# Patient Record
Sex: FEMALE | Race: WHITE | NOT HISPANIC OR LATINO | Employment: OTHER | URBAN - NONMETROPOLITAN AREA
[De-identification: names, ages, dates, MRNs, and addresses within clinical notes are randomized per-mention and may not be internally consistent; named-entity substitution may affect disease eponyms.]

---

## 2018-04-04 ENCOUNTER — HOSPITAL ENCOUNTER (EMERGENCY)
Facility: HOSPITAL | Age: 63
Discharge: ANOTHER HOSPITAL | End: 2018-04-04
Attending: EMERGENCY MEDICINE | Admitting: EMERGENCY MEDICINE

## 2018-04-04 ENCOUNTER — APPOINTMENT (OUTPATIENT)
Dept: GENERAL RADIOLOGY | Facility: HOSPITAL | Age: 63
End: 2018-04-04

## 2018-04-04 ENCOUNTER — APPOINTMENT (OUTPATIENT)
Dept: CT IMAGING | Facility: HOSPITAL | Age: 63
End: 2018-04-04

## 2018-04-04 VITALS
SYSTOLIC BLOOD PRESSURE: 175 MMHG | DIASTOLIC BLOOD PRESSURE: 100 MMHG | TEMPERATURE: 98.5 F | HEIGHT: 70 IN | BODY MASS INDEX: 34.36 KG/M2 | HEART RATE: 97 BPM | OXYGEN SATURATION: 99 % | WEIGHT: 240 LBS | RESPIRATION RATE: 18 BRPM

## 2018-04-04 DIAGNOSIS — K92.2 UPPER GI BLEED: Primary | ICD-10-CM

## 2018-04-04 DIAGNOSIS — D64.9 ANEMIA, UNSPECIFIED TYPE: ICD-10-CM

## 2018-04-04 DIAGNOSIS — R74.8 CARDIAC ENZYMES ELEVATED: ICD-10-CM

## 2018-04-04 DIAGNOSIS — R07.9 CHEST PAIN, UNSPECIFIED TYPE: ICD-10-CM

## 2018-04-04 LAB
ABO GROUP BLD: NORMAL
ALBUMIN SERPL-MCNC: 3.6 G/DL (ref 3.4–4.8)
ALBUMIN/GLOB SERPL: 1.2 G/DL (ref 1.5–2.5)
ALP SERPL-CCNC: 44 U/L (ref 35–104)
ALT SERPL W P-5'-P-CCNC: 15 U/L (ref 10–36)
AMYLASE SERPL-CCNC: 53 U/L (ref 28–100)
ANION GAP SERPL CALCULATED.3IONS-SCNC: 11.2 MMOL/L (ref 3.6–11.2)
ANTI-E: NORMAL
AST SERPL-CCNC: 32 U/L (ref 10–30)
BASOPHILS # BLD AUTO: 0.02 10*3/MM3 (ref 0–0.3)
BASOPHILS NFR BLD AUTO: 0.1 % (ref 0–2)
BILIRUB SERPL-MCNC: 0.2 MG/DL (ref 0.2–1.8)
BILIRUB UR QL STRIP: NEGATIVE
BLD GP AB SCN SERPL QL: POSITIVE
BUN BLD-MCNC: 22 MG/DL (ref 7–21)
BUN/CREAT SERPL: 25.6 (ref 7–25)
CALCIUM SPEC-SCNC: 9.6 MG/DL (ref 7.7–10)
CHLORIDE SERPL-SCNC: 94 MMOL/L (ref 99–112)
CLARITY UR: ABNORMAL
CO2 SERPL-SCNC: 24.8 MMOL/L (ref 24.3–31.9)
COLOR UR: YELLOW
CREAT BLD-MCNC: 0.86 MG/DL (ref 0.43–1.29)
DEPRECATED RDW RBC AUTO: 38.8 FL (ref 37–54)
EOSINOPHIL # BLD AUTO: 0 10*3/MM3 (ref 0–0.7)
EOSINOPHIL NFR BLD AUTO: 0 % (ref 0–5)
ERYTHROCYTE [DISTWIDTH] IN BLOOD BY AUTOMATED COUNT: 12.2 % (ref 11.5–14.5)
GFR SERPL CREATININE-BSD FRML MDRD: 67 ML/MIN/1.73
GLOBULIN UR ELPH-MCNC: 3.1 GM/DL
GLUCOSE BLD-MCNC: 249 MG/DL (ref 70–110)
GLUCOSE UR STRIP-MCNC: ABNORMAL MG/DL
HCT VFR BLD AUTO: 33.4 % (ref 37–47)
HGB BLD-MCNC: 11 G/DL (ref 12–16)
HGB UR QL STRIP.AUTO: NEGATIVE
IMM GRANULOCYTES # BLD: 0.06 10*3/MM3 (ref 0–0.03)
IMM GRANULOCYTES NFR BLD: 0.3 % (ref 0–0.5)
KETONES UR QL STRIP: ABNORMAL
LEUKOCYTE ESTERASE UR QL STRIP.AUTO: NEGATIVE
LIPASE SERPL-CCNC: 18 U/L (ref 13–60)
LYMPHOCYTES # BLD AUTO: 1.02 10*3/MM3 (ref 1–3)
LYMPHOCYTES NFR BLD AUTO: 5.2 % (ref 21–51)
MCH RBC QN AUTO: 30.2 PG (ref 27–33)
MCHC RBC AUTO-ENTMCNC: 32.9 G/DL (ref 33–37)
MCV RBC AUTO: 91.8 FL (ref 80–94)
MONOCYTES # BLD AUTO: 0.94 10*3/MM3 (ref 0.1–0.9)
MONOCYTES NFR BLD AUTO: 4.8 % (ref 0–10)
NEUTROPHILS # BLD AUTO: 17.55 10*3/MM3 (ref 1.4–6.5)
NEUTROPHILS NFR BLD AUTO: 89.6 % (ref 30–70)
NITRITE UR QL STRIP: NEGATIVE
OSMOLALITY SERPL CALC.SUM OF ELEC: 272.5 MOSM/KG (ref 273–305)
PH UR STRIP.AUTO: 7 [PH] (ref 5–8)
PLATELET # BLD AUTO: 513 10*3/MM3 (ref 130–400)
PMV BLD AUTO: 9.5 FL (ref 6–10)
PROT SERPL-MCNC: 6.7 G/DL (ref 6–8)
PROT UR QL STRIP: NEGATIVE
RBC # BLD AUTO: 3.64 10*6/MM3 (ref 4.2–5.4)
RH BLD: POSITIVE
SODIUM BLD-SCNC: 130 MMOL/L (ref 135–153)
SP GR UR STRIP: 1.02 (ref 1–1.03)
T&S EXPIRATION DATE: NORMAL
TROPONIN I SERPL-MCNC: 0.03 NG/ML
TROPONIN I SERPL-MCNC: 0.1 NG/ML
UROBILINOGEN UR QL STRIP: ABNORMAL
WBC NRBC COR # BLD: 19.59 10*3/MM3 (ref 4.5–12.5)

## 2018-04-04 PROCEDURE — 86870 RBC ANTIBODY IDENTIFICATION: CPT

## 2018-04-04 PROCEDURE — 74176 CT ABD & PELVIS W/O CONTRAST: CPT | Performed by: RADIOLOGY

## 2018-04-04 PROCEDURE — 84484 ASSAY OF TROPONIN QUANT: CPT | Performed by: EMERGENCY MEDICINE

## 2018-04-04 PROCEDURE — 87040 BLOOD CULTURE FOR BACTERIA: CPT | Performed by: EMERGENCY MEDICINE

## 2018-04-04 PROCEDURE — 74176 CT ABD & PELVIS W/O CONTRAST: CPT

## 2018-04-04 PROCEDURE — 25010000002 MORPHINE PER 10 MG

## 2018-04-04 PROCEDURE — 96365 THER/PROPH/DIAG IV INF INIT: CPT

## 2018-04-04 PROCEDURE — 71045 X-RAY EXAM CHEST 1 VIEW: CPT | Performed by: RADIOLOGY

## 2018-04-04 PROCEDURE — 25010000002 MORPHINE PER 10 MG: Performed by: EMERGENCY MEDICINE

## 2018-04-04 PROCEDURE — 80053 COMPREHEN METABOLIC PANEL: CPT | Performed by: EMERGENCY MEDICINE

## 2018-04-04 PROCEDURE — 85025 COMPLETE CBC W/AUTO DIFF WBC: CPT | Performed by: EMERGENCY MEDICINE

## 2018-04-04 PROCEDURE — 86870 RBC ANTIBODY IDENTIFICATION: CPT | Performed by: EMERGENCY MEDICINE

## 2018-04-04 PROCEDURE — 83690 ASSAY OF LIPASE: CPT | Performed by: EMERGENCY MEDICINE

## 2018-04-04 PROCEDURE — 93005 ELECTROCARDIOGRAM TRACING: CPT | Performed by: EMERGENCY MEDICINE

## 2018-04-04 PROCEDURE — 82150 ASSAY OF AMYLASE: CPT | Performed by: EMERGENCY MEDICINE

## 2018-04-04 PROCEDURE — 86900 BLOOD TYPING SEROLOGIC ABO: CPT | Performed by: EMERGENCY MEDICINE

## 2018-04-04 PROCEDURE — 96375 TX/PRO/DX INJ NEW DRUG ADDON: CPT

## 2018-04-04 PROCEDURE — 86850 RBC ANTIBODY SCREEN: CPT | Performed by: EMERGENCY MEDICINE

## 2018-04-04 PROCEDURE — 86901 BLOOD TYPING SEROLOGIC RH(D): CPT | Performed by: EMERGENCY MEDICINE

## 2018-04-04 PROCEDURE — 71045 X-RAY EXAM CHEST 1 VIEW: CPT

## 2018-04-04 PROCEDURE — 86905 BLOOD TYPING RBC ANTIGENS: CPT

## 2018-04-04 PROCEDURE — 81003 URINALYSIS AUTO W/O SCOPE: CPT | Performed by: EMERGENCY MEDICINE

## 2018-04-04 PROCEDURE — 93010 ELECTROCARDIOGRAM REPORT: CPT | Performed by: INTERNAL MEDICINE

## 2018-04-04 PROCEDURE — 99284 EMERGENCY DEPT VISIT MOD MDM: CPT

## 2018-04-04 PROCEDURE — 25010000002 ONDANSETRON PER 1 MG: Performed by: EMERGENCY MEDICINE

## 2018-04-04 PROCEDURE — 96376 TX/PRO/DX INJ SAME DRUG ADON: CPT

## 2018-04-04 RX ORDER — ASPIRIN 81 MG/1
324 TABLET, CHEWABLE ORAL ONCE
Status: COMPLETED | OUTPATIENT
Start: 2018-04-04 | End: 2018-04-04

## 2018-04-04 RX ORDER — MORPHINE SULFATE 4 MG/ML
INJECTION, SOLUTION INTRAMUSCULAR; INTRAVENOUS
Status: COMPLETED
Start: 2018-04-04 | End: 2018-04-04

## 2018-04-04 RX ORDER — ONDANSETRON 2 MG/ML
4 INJECTION INTRAMUSCULAR; INTRAVENOUS ONCE
Status: COMPLETED | OUTPATIENT
Start: 2018-04-04 | End: 2018-04-04

## 2018-04-04 RX ORDER — ONDANSETRON 2 MG/ML
INJECTION INTRAMUSCULAR; INTRAVENOUS
Status: DISCONTINUED
Start: 2018-04-04 | End: 2018-04-04 | Stop reason: HOSPADM

## 2018-04-04 RX ORDER — PANTOPRAZOLE SODIUM 40 MG/10ML
80 INJECTION, POWDER, LYOPHILIZED, FOR SOLUTION INTRAVENOUS ONCE
Status: COMPLETED | OUTPATIENT
Start: 2018-04-04 | End: 2018-04-04

## 2018-04-04 RX ORDER — MORPHINE SULFATE 4 MG/ML
INJECTION, SOLUTION INTRAMUSCULAR; INTRAVENOUS
Status: DISCONTINUED
Start: 2018-04-04 | End: 2018-04-04 | Stop reason: HOSPADM

## 2018-04-04 RX ORDER — LABETALOL HYDROCHLORIDE 5 MG/ML
10 INJECTION, SOLUTION INTRAVENOUS ONCE
Status: COMPLETED | OUTPATIENT
Start: 2018-04-04 | End: 2018-04-04

## 2018-04-04 RX ORDER — SODIUM CHLORIDE 0.9 % (FLUSH) 0.9 %
10 SYRINGE (ML) INJECTION AS NEEDED
Status: DISCONTINUED | OUTPATIENT
Start: 2018-04-04 | End: 2018-04-04 | Stop reason: HOSPADM

## 2018-04-04 RX ADMIN — ASPIRIN 324 MG: 81 TABLET, CHEWABLE ORAL at 15:12

## 2018-04-04 RX ADMIN — SODIUM CHLORIDE 1000 ML: 9 INJECTION, SOLUTION INTRAVENOUS at 15:30

## 2018-04-04 RX ADMIN — MORPHINE SULFATE 4 MG: 4 INJECTION, SOLUTION INTRAMUSCULAR; INTRAVENOUS at 19:56

## 2018-04-04 RX ADMIN — NITROGLYCERIN 1 INCH: 20 OINTMENT TOPICAL at 15:27

## 2018-04-04 RX ADMIN — ONDANSETRON 4 MG: 2 INJECTION INTRAMUSCULAR; INTRAVENOUS at 16:43

## 2018-04-04 RX ADMIN — ONDANSETRON 4 MG: 2 INJECTION INTRAMUSCULAR; INTRAVENOUS at 19:56

## 2018-04-04 RX ADMIN — PANTOPRAZOLE SODIUM 80 MG: 40 INJECTION, POWDER, FOR SOLUTION INTRAVENOUS at 16:43

## 2018-04-04 RX ADMIN — MORPHINE SULFATE 4 MG: 4 INJECTION, SOLUTION INTRAMUSCULAR; INTRAVENOUS at 17:47

## 2018-04-04 RX ADMIN — MORPHINE SULFATE 4 MG: 4 INJECTION, SOLUTION INTRAMUSCULAR; INTRAVENOUS at 16:43

## 2018-04-04 RX ADMIN — PANTOPRAZOLE SODIUM 8 MG/HR: 40 INJECTION, POWDER, FOR SOLUTION INTRAVENOUS at 16:56

## 2018-04-04 RX ADMIN — ONDANSETRON 4 MG: 2 INJECTION INTRAMUSCULAR; INTRAVENOUS at 15:32

## 2018-04-04 RX ADMIN — Medication 4 MG: at 17:47

## 2018-04-04 RX ADMIN — LABETALOL HYDROCHLORIDE 10 MG: 5 INJECTION, SOLUTION INTRAVENOUS at 16:18

## 2018-04-04 NOTE — ED NOTES
"Pt family came to nurses' station and informed that the patient was \"fixing to vomit\".  Pt had large amount of dark colored and foul smelling vomit, Dr. Edouard notified.  Gastroocult obtained and positive at this time.     Danielle Pope RN  04/04/18 8015    "

## 2018-04-04 NOTE — ED NOTES
I called [facility] back because we didn't hear from the [facility]. Dispatch said [facility] accepted the run but it would be after their truck clears up that is currently bringing in the new patient.     Corey Olguin  04/04/18 1929

## 2018-04-04 NOTE — ED PROVIDER NOTES
Subjective     History provided by:  Patient  Vomiting   The primary symptoms include abdominal pain (mild), nausea and vomiting. Primary symptoms do not include fever or diarrhea. The illness began more than 7 days ago (3-4 weeks). The onset was gradual. The problem has not changed since onset.  The abdominal pain is generalized.   The emesis contains stomach contents.       Review of Systems   Constitutional: Negative for fever.   Gastrointestinal: Positive for abdominal pain (mild), nausea and vomiting. Negative for diarrhea.       Past Medical History:   Diagnosis Date   • Arthritis    • Asthma    • Blind    • Hypertension    • Low back pain    • Seizures    • Stomach ulcer        Allergies   Allergen Reactions   • Zithromax [Azithromycin] Hives   • Codeine Rash   • Penicillins Rash   • Phenobarbital Anxiety       Past Surgical History:   Procedure Laterality Date   • APPENDECTOMY     • BREAST SURGERY      reduction   • CHOLECYSTECTOMY     • HYSTERECTOMY         Family History   Problem Relation Age of Onset   • Cancer Mother    • Diabetes Mother    • Hypertension Mother    • Hypertension Father    • Heart disease Father        Social History     Social History   • Marital status:      Social History Main Topics   • Smoking status: Never Smoker   • Smokeless tobacco: Never Used   • Alcohol use No   • Drug use: No   • Sexual activity: Defer     Other Topics Concern   • Not on file           Objective   Physical Exam   Constitutional: She is oriented to person, place, and time. She appears well-developed and well-nourished.   HENT:   Head: Normocephalic and atraumatic.   Cardiovascular: Normal rate, regular rhythm and normal heart sounds.  Exam reveals no gallop and no friction rub.    No murmur heard.  Pulmonary/Chest: Effort normal and breath sounds normal. No respiratory distress. She has no wheezes. She has no rales.   Abdominal: Soft. Bowel sounds are normal. She exhibits no distension. There is no  tenderness.   Musculoskeletal: Normal range of motion. She exhibits no edema.   Neurological: She is alert and oriented to person, place, and time.   Legally blind.  Hard of hearing.   Skin: Skin is warm and dry.   Psychiatric: She has a normal mood and affect.   Nursing note and vitals reviewed.      Procedures  Results for orders placed or performed during the hospital encounter of 04/04/18   Comprehensive Metabolic Panel   Result Value Ref Range    Glucose 249 (H) 70 - 110 mg/dL    BUN 22 (H) 7 - 21 mg/dL    Creatinine 0.86 0.43 - 1.29 mg/dL    Sodium 130 (L) 135 - 153 mmol/L    Chloride 94 (L) 99 - 112 mmol/L    CO2 24.8 24.3 - 31.9 mmol/L    Calcium 9.6 7.7 - 10.0 mg/dL    Total Protein 6.7 6.0 - 8.0 g/dL    Albumin 3.60 3.40 - 4.80 g/dL    ALT (SGPT) 15 10 - 36 U/L    AST (SGOT) 32 (H) 10 - 30 U/L    Alkaline Phosphatase 44 35 - 104 U/L    Total Bilirubin 0.2 0.2 - 1.8 mg/dL    eGFR Non African Amer 67 >60 mL/min/1.73    Globulin 3.1 gm/dL    A/G Ratio 1.2 (L) 1.5 - 2.5 g/dL    BUN/Creatinine Ratio 25.6 (H) 7.0 - 25.0    Anion Gap 11.2 3.6 - 11.2 mmol/L   Lipase   Result Value Ref Range    Lipase 18 13 - 60 U/L   Amylase   Result Value Ref Range    Amylase 53 28 - 100 U/L   Urinalysis With / Culture If Indicated - Urine, Clean Catch   Result Value Ref Range    Color, UA Yellow Yellow, Straw    Appearance, UA Turbid (A) Clear    pH, UA 7.0 5.0 - 8.0    Specific Gravity, UA 1.024 1.005 - 1.030    Glucose,  mg/dL (2+) (A) Negative    Ketones, UA Trace (A) Negative    Bilirubin, UA Negative Negative    Blood, UA Negative Negative    Protein, UA Negative Negative    Leuk Esterase, UA Negative Negative    Nitrite, UA Negative Negative    Urobilinogen, UA 0.2 E.U./dL 0.2 - 1.0 E.U./dL   Troponin   Result Value Ref Range    Troponin I 0.034 <=0.040 ng/mL   CBC Auto Differential   Result Value Ref Range    WBC 19.59 (H) 4.50 - 12.50 10*3/mm3    RBC 3.64 (L) 4.20 - 5.40 10*6/mm3    Hemoglobin 11.0 (L) 12.0 - 16.0  g/dL    Hematocrit 33.4 (L) 37.0 - 47.0 %    MCV 91.8 80.0 - 94.0 fL    MCH 30.2 27.0 - 33.0 pg    MCHC 32.9 (L) 33.0 - 37.0 g/dL    RDW 12.2 11.5 - 14.5 %    RDW-SD 38.8 37.0 - 54.0 fl    MPV 9.5 6.0 - 10.0 fL    Platelets 513 (H) 130 - 400 10*3/mm3    Neutrophil % 89.6 (H) 30.0 - 70.0 %    Lymphocyte % 5.2 (L) 21.0 - 51.0 %    Monocyte % 4.8 0.0 - 10.0 %    Eosinophil % 0.0 0.0 - 5.0 %    Basophil % 0.1 0.0 - 2.0 %    Immature Grans % 0.3 0.0 - 0.5 %    Neutrophils, Absolute 17.55 (H) 1.40 - 6.50 10*3/mm3    Lymphocytes, Absolute 1.02 1.00 - 3.00 10*3/mm3    Monocytes, Absolute 0.94 (H) 0.10 - 0.90 10*3/mm3    Eosinophils, Absolute 0.00 0.00 - 0.70 10*3/mm3    Basophils, Absolute 0.02 0.00 - 0.30 10*3/mm3    Immature Grans, Absolute 0.06 (H) 0.00 - 0.03 10*3/mm3   Osmolality, Calculated   Result Value Ref Range    Osmolality Calc 272.5 (L) 273.0 - 305.0 mOsm/kg   Troponin   Result Value Ref Range    Troponin I 0.104 (H) <=0.040 ng/mL   Type & Screen   Result Value Ref Range    ABO Type O     RH type Positive      Xr Chest 1 View    Result Date: 4/4/2018  Narrative: EXAMINATION: XR CHEST 1 VW-  CLINICAL INDICATION:     cp  TECHNIQUE:  XR CHEST 1 VW-  COMPARISON: NONE  FINDINGS: Lungs are aerated. Probable prominent right-sided epicardial fat. Borderline cardiomegaly. No pneumothorax. No pleural effusion. No acute osseous findings.         Impression: No acute cardio bony findings identified. Probable prominent right epicardial fat noted.  This report was finalized on 4/4/2018 3:06 PM by Dr. Bora Foreman MD.             ED Course  ED Course   Value Comment By Time   ECG 12 Lead Normal sinus rhythm.  Rate 95.  T-wave inversions in lead III, isoelectric T waves in aVF.  No ST elevations or depressions.  No old EKGs for comparison. Wilbur Edouard MD 04/04 4346    Patient had episode of vomiting here which was coffee-ground emesis, gastric occult positive.  Hemodynamically, she is continued to be hypertensive.  She's  not had any further episodes after this.  Her workup otherwise remarkable for indeterminate, but increasing, troponin.  She has mild anemia, but no old labs for comparison.  She also has elevated white blood cell count.  Imaging without significant abnormals. Wilbur Edouard MD 04/04 1834    We do not have any gastrectomy urology coverage at this time.  Have discussed with Dr. Sotelo at [facility] who agrees to take the patient in transfer.  He just requests that we discussed this with Dr Neff. Wilbur Edouard MD 04/04 1841    Discussed with Dr. Neff who recommends accepting patient in transfer. Wilbur Edouard MD 04/04 1851                  MDM  Number of Diagnoses or Management Options  Anemia, unspecified type:   Cardiac enzymes elevated:   Chest pain, unspecified type:   Upper GI bleed:      Amount and/or Complexity of Data Reviewed  Clinical lab tests: reviewed  Tests in the radiology section of CPT®: reviewed  Tests in the medicine section of CPT®: reviewed  Independent visualization of images, tracings, or specimens: yes    Risk of Complications, Morbidity, and/or Mortality  Presenting problems: high  Diagnostic procedures: high  Management options: high        Final diagnoses:   Upper GI bleed   Chest pain, unspecified type   Cardiac enzymes elevated   Anemia, unspecified type            Wilbur Edouard MD  04/04/18 2038

## 2018-04-04 NOTE — ED NOTES
"I called [facility] for an ALS truck, dispatch said, \"Let me get in touch with the [facility] and I'll have them get a hold of you.\"     Corey Olguin  04/04/18 1909    "

## 2018-04-05 NOTE — ED NOTES
"I called [facility] to touch base about the run. Dispatch said, \"it has been paged, they should be there shortly.\"     Corey Olguin  04/04/18 2031    "

## 2018-04-05 NOTE — ED NOTES
Pt left via [facility] ems, no acute changes noted in patient's health     Emily Malhotra RN  04/04/18 8470

## 2018-04-05 NOTE — ED NOTES
I called EMS to take the patien to [facility], so the ER2 patient can have EMS truck due to it being several hours before they could get a run to take that patient to [place].     Corey Olguin  04/04/18 9296

## 2018-04-09 LAB
BACTERIA SPEC AEROBE CULT: NORMAL
BACTERIA SPEC AEROBE CULT: NORMAL

## 2018-12-16 ENCOUNTER — APPOINTMENT (OUTPATIENT)
Dept: CT IMAGING | Facility: HOSPITAL | Age: 63
End: 2018-12-16

## 2018-12-16 ENCOUNTER — APPOINTMENT (OUTPATIENT)
Dept: GENERAL RADIOLOGY | Facility: HOSPITAL | Age: 63
End: 2018-12-16

## 2018-12-16 ENCOUNTER — HOSPITAL ENCOUNTER (INPATIENT)
Facility: HOSPITAL | Age: 63
LOS: 3 days | Discharge: HOME HEALTH | End: 2018-12-19
Attending: GENERAL ACUTE CARE HOSPITAL | Admitting: HOSPITALIST

## 2018-12-16 DIAGNOSIS — R11.2 INTRACTABLE VOMITING WITH NAUSEA, UNSPECIFIED VOMITING TYPE: Primary | ICD-10-CM

## 2018-12-16 DIAGNOSIS — K44.9 HIATAL HERNIA: ICD-10-CM

## 2018-12-16 DIAGNOSIS — E83.42 HYPOMAGNESEMIA: ICD-10-CM

## 2018-12-16 DIAGNOSIS — E87.6 HYPOKALEMIA: ICD-10-CM

## 2018-12-16 LAB
ABO GROUP BLD: NORMAL
ALBUMIN SERPL-MCNC: 3.6 G/DL (ref 3.4–4.8)
ALBUMIN/GLOB SERPL: 1.2 G/DL (ref 1.5–2.5)
ALP SERPL-CCNC: 93 U/L (ref 35–104)
ALT SERPL W P-5'-P-CCNC: 17 U/L (ref 10–36)
AMYLASE SERPL-CCNC: 39 U/L (ref 28–100)
ANION GAP SERPL CALCULATED.3IONS-SCNC: 9.1 MMOL/L (ref 3.6–11.2)
ANISOCYTOSIS BLD QL: NORMAL
ANTI-E: NORMAL
APTT PPP: 30.7 SECONDS (ref 23.8–36.1)
AST SERPL-CCNC: 33 U/L (ref 10–30)
BASOPHILS # BLD AUTO: 0.03 10*3/MM3 (ref 0–0.3)
BASOPHILS NFR BLD AUTO: 0.3 % (ref 0–2)
BILIRUB SERPL-MCNC: 0.2 MG/DL (ref 0.2–1.8)
BILIRUB UR QL STRIP: NEGATIVE
BLD GP AB SCN SERPL QL: POSITIVE
BUN BLD-MCNC: 13 MG/DL (ref 7–21)
BUN/CREAT SERPL: 20.6 (ref 7–25)
CALCIUM SPEC-SCNC: 8.4 MG/DL (ref 7.7–10)
CHLORIDE SERPL-SCNC: 89 MMOL/L (ref 99–112)
CLARITY UR: CLEAR
CO2 SERPL-SCNC: 33.9 MMOL/L (ref 24.3–31.9)
COLOR UR: ABNORMAL
CREAT BLD-MCNC: 0.63 MG/DL (ref 0.43–1.29)
DEPRECATED RDW RBC AUTO: 42.4 FL (ref 37–54)
DEVELOPER EXPIRATION DATE: ABNORMAL
DEVELOPER LOT NUMBER: ABNORMAL
EOSINOPHIL # BLD AUTO: 0.01 10*3/MM3 (ref 0–0.7)
EOSINOPHIL NFR BLD AUTO: 0.1 % (ref 0–5)
ERYTHROCYTE [DISTWIDTH] IN BLOOD BY AUTOMATED COUNT: 16.8 % (ref 11.5–14.5)
EXPIRATION DATE: ABNORMAL
FECAL OCCULT BLOOD SCREEN, POC: POSITIVE
GFR SERPL CREATININE-BSD FRML MDRD: 96 ML/MIN/1.73
GLOBULIN UR ELPH-MCNC: 2.9 GM/DL
GLUCOSE BLD-MCNC: 118 MG/DL (ref 70–110)
GLUCOSE UR STRIP-MCNC: NEGATIVE MG/DL
HCT VFR BLD AUTO: 32.8 % (ref 37–47)
HGB BLD-MCNC: 9.5 G/DL (ref 12–16)
HGB UR QL STRIP.AUTO: NEGATIVE
HOLD SPECIMEN: NORMAL
HOLD SPECIMEN: NORMAL
HYPOCHROMIA BLD QL: NORMAL
IMM GRANULOCYTES # BLD: 0.03 10*3/MM3 (ref 0–0.03)
IMM GRANULOCYTES NFR BLD: 0.3 % (ref 0–0.5)
INR PPP: 1.7 (ref 0.9–1.1)
KETONES UR QL STRIP: ABNORMAL
LEUKOCYTE ESTERASE UR QL STRIP.AUTO: NEGATIVE
LIPASE SERPL-CCNC: 21 U/L (ref 13–60)
LYMPHOCYTES # BLD AUTO: 2.8 10*3/MM3 (ref 1–3)
LYMPHOCYTES NFR BLD AUTO: 24.7 % (ref 21–51)
Lab: ABNORMAL
MAGNESIUM SERPL-MCNC: 1.1 MG/DL (ref 1.7–2.6)
MCH RBC QN AUTO: 20.3 PG (ref 27–33)
MCHC RBC AUTO-ENTMCNC: 29 G/DL (ref 33–37)
MCV RBC AUTO: 69.9 FL (ref 80–94)
MICROCYTES BLD QL: NORMAL
MONOCYTES # BLD AUTO: 1.5 10*3/MM3 (ref 0.1–0.9)
MONOCYTES NFR BLD AUTO: 13.3 % (ref 0–10)
NEGATIVE CONTROL: NEGATIVE
NEUTROPHILS # BLD AUTO: 6.95 10*3/MM3 (ref 1.4–6.5)
NEUTROPHILS NFR BLD AUTO: 61.3 % (ref 30–70)
NITRITE UR QL STRIP: NEGATIVE
OSMOLALITY SERPL CALC.SUM OF ELEC: 265.7 MOSM/KG (ref 273–305)
PH UR STRIP.AUTO: 6.5 [PH] (ref 5–8)
PLAT MORPH BLD: NORMAL
PLATELET # BLD AUTO: 504 10*3/MM3 (ref 130–400)
PMV BLD AUTO: 8.9 FL (ref 6–10)
POSITIVE CONTROL: POSITIVE
POTASSIUM BLD-SCNC: 2.3 MMOL/L (ref 3.5–5.3)
PROT SERPL-MCNC: 6.5 G/DL (ref 6–8)
PROT UR QL STRIP: ABNORMAL
PROTHROMBIN TIME: 20.2 SECONDS (ref 11–15.4)
RBC # BLD AUTO: 4.69 10*6/MM3 (ref 4.2–5.4)
RH BLD: POSITIVE
SODIUM BLD-SCNC: 132 MMOL/L (ref 135–153)
SP GR UR STRIP: 1.02 (ref 1–1.03)
STOMATOCYTES BLD QL SMEAR: NORMAL
T&S EXPIRATION DATE: NORMAL
T4 FREE SERPL-MCNC: 1.32 NG/DL (ref 0.89–1.76)
TROPONIN I SERPL-MCNC: 0.07 NG/ML
TROPONIN I SERPL-MCNC: 0.12 NG/ML
TSH SERPL DL<=0.05 MIU/L-ACNC: 1.59 MIU/ML (ref 0.55–4.78)
UROBILINOGEN UR QL STRIP: ABNORMAL
WBC NRBC COR # BLD: 11.32 10*3/MM3 (ref 4.5–12.5)
WHOLE BLOOD HOLD SPECIMEN: NORMAL
WHOLE BLOOD HOLD SPECIMEN: NORMAL

## 2018-12-16 PROCEDURE — 25010000002 IOPAMIDOL 61 % SOLUTION: Performed by: EMERGENCY MEDICINE

## 2018-12-16 PROCEDURE — 82270 OCCULT BLOOD FECES: CPT | Performed by: EMERGENCY MEDICINE

## 2018-12-16 PROCEDURE — 71045 X-RAY EXAM CHEST 1 VIEW: CPT | Performed by: RADIOLOGY

## 2018-12-16 PROCEDURE — 86901 BLOOD TYPING SEROLOGIC RH(D): CPT | Performed by: GENERAL ACUTE CARE HOSPITAL

## 2018-12-16 PROCEDURE — 84443 ASSAY THYROID STIM HORMONE: CPT | Performed by: EMERGENCY MEDICINE

## 2018-12-16 PROCEDURE — 86850 RBC ANTIBODY SCREEN: CPT | Performed by: GENERAL ACUTE CARE HOSPITAL

## 2018-12-16 PROCEDURE — 25010000002 MAGNESIUM SULFATE 2 GM/50ML SOLUTION: Performed by: EMERGENCY MEDICINE

## 2018-12-16 PROCEDURE — 36415 COLL VENOUS BLD VENIPUNCTURE: CPT

## 2018-12-16 PROCEDURE — 85025 COMPLETE CBC W/AUTO DIFF WBC: CPT | Performed by: GENERAL ACUTE CARE HOSPITAL

## 2018-12-16 PROCEDURE — 85007 BL SMEAR W/DIFF WBC COUNT: CPT | Performed by: GENERAL ACUTE CARE HOSPITAL

## 2018-12-16 PROCEDURE — 74177 CT ABD & PELVIS W/CONTRAST: CPT | Performed by: RADIOLOGY

## 2018-12-16 PROCEDURE — 81003 URINALYSIS AUTO W/O SCOPE: CPT | Performed by: EMERGENCY MEDICINE

## 2018-12-16 PROCEDURE — 25010000002 MORPHINE PER 10 MG: Performed by: EMERGENCY MEDICINE

## 2018-12-16 PROCEDURE — P9612 CATHETERIZE FOR URINE SPEC: HCPCS

## 2018-12-16 PROCEDURE — 25010000002 MORPHINE PER 10 MG

## 2018-12-16 PROCEDURE — 80053 COMPREHEN METABOLIC PANEL: CPT | Performed by: GENERAL ACUTE CARE HOSPITAL

## 2018-12-16 PROCEDURE — 74177 CT ABD & PELVIS W/CONTRAST: CPT

## 2018-12-16 PROCEDURE — 86920 COMPATIBILITY TEST SPIN: CPT

## 2018-12-16 PROCEDURE — 71045 X-RAY EXAM CHEST 1 VIEW: CPT

## 2018-12-16 PROCEDURE — 85730 THROMBOPLASTIN TIME PARTIAL: CPT | Performed by: GENERAL ACUTE CARE HOSPITAL

## 2018-12-16 PROCEDURE — 25010000002 ONDANSETRON PER 1 MG: Performed by: GENERAL ACUTE CARE HOSPITAL

## 2018-12-16 PROCEDURE — 86922 COMPATIBILITY TEST ANTIGLOB: CPT

## 2018-12-16 PROCEDURE — 86900 BLOOD TYPING SEROLOGIC ABO: CPT | Performed by: GENERAL ACUTE CARE HOSPITAL

## 2018-12-16 PROCEDURE — 84484 ASSAY OF TROPONIN QUANT: CPT | Performed by: EMERGENCY MEDICINE

## 2018-12-16 PROCEDURE — 99285 EMERGENCY DEPT VISIT HI MDM: CPT

## 2018-12-16 PROCEDURE — 84484 ASSAY OF TROPONIN QUANT: CPT | Performed by: GENERAL ACUTE CARE HOSPITAL

## 2018-12-16 PROCEDURE — 82150 ASSAY OF AMYLASE: CPT | Performed by: EMERGENCY MEDICINE

## 2018-12-16 PROCEDURE — 25010000003 POTASSIUM CHLORIDE 10 MEQ/100ML SOLUTION: Performed by: EMERGENCY MEDICINE

## 2018-12-16 PROCEDURE — 85610 PROTHROMBIN TIME: CPT | Performed by: GENERAL ACUTE CARE HOSPITAL

## 2018-12-16 PROCEDURE — 84439 ASSAY OF FREE THYROXINE: CPT | Performed by: EMERGENCY MEDICINE

## 2018-12-16 PROCEDURE — 83735 ASSAY OF MAGNESIUM: CPT | Performed by: EMERGENCY MEDICINE

## 2018-12-16 PROCEDURE — 83690 ASSAY OF LIPASE: CPT | Performed by: GENERAL ACUTE CARE HOSPITAL

## 2018-12-16 PROCEDURE — 93005 ELECTROCARDIOGRAM TRACING: CPT | Performed by: EMERGENCY MEDICINE

## 2018-12-16 PROCEDURE — 86902 BLOOD TYPE ANTIGEN DONOR EA: CPT

## 2018-12-16 PROCEDURE — 86870 RBC ANTIBODY IDENTIFICATION: CPT | Performed by: GENERAL ACUTE CARE HOSPITAL

## 2018-12-16 PROCEDURE — 25010000002 ONDANSETRON PER 1 MG: Performed by: EMERGENCY MEDICINE

## 2018-12-16 RX ORDER — MAGNESIUM SULFATE HEPTAHYDRATE 40 MG/ML
2 INJECTION, SOLUTION INTRAVENOUS ONCE
Status: COMPLETED | OUTPATIENT
Start: 2018-12-16 | End: 2018-12-17

## 2018-12-16 RX ORDER — POTASSIUM CHLORIDE 750 MG/1
40 CAPSULE, EXTENDED RELEASE ORAL AS NEEDED
Status: DISCONTINUED | OUTPATIENT
Start: 2018-12-16 | End: 2018-12-19 | Stop reason: HOSPADM

## 2018-12-16 RX ORDER — POTASSIUM CHLORIDE 7.45 MG/ML
10 INJECTION INTRAVENOUS
Status: DISCONTINUED | OUTPATIENT
Start: 2018-12-16 | End: 2018-12-17

## 2018-12-16 RX ORDER — POTASSIUM CHLORIDE 7.45 MG/ML
10 INJECTION INTRAVENOUS
Status: DISCONTINUED | OUTPATIENT
Start: 2018-12-16 | End: 2018-12-19 | Stop reason: HOSPADM

## 2018-12-16 RX ORDER — PANTOPRAZOLE SODIUM 40 MG/10ML
40 INJECTION, POWDER, LYOPHILIZED, FOR SOLUTION INTRAVENOUS ONCE
Status: COMPLETED | OUTPATIENT
Start: 2018-12-16 | End: 2018-12-16

## 2018-12-16 RX ORDER — MAGNESIUM SULFATE HEPTAHYDRATE 40 MG/ML
4 INJECTION, SOLUTION INTRAVENOUS AS NEEDED
Status: DISCONTINUED | OUTPATIENT
Start: 2018-12-16 | End: 2018-12-19 | Stop reason: HOSPADM

## 2018-12-16 RX ORDER — MAGNESIUM SULFATE HEPTAHYDRATE 40 MG/ML
2 INJECTION, SOLUTION INTRAVENOUS AS NEEDED
Status: DISCONTINUED | OUTPATIENT
Start: 2018-12-16 | End: 2018-12-19 | Stop reason: HOSPADM

## 2018-12-16 RX ORDER — SODIUM CHLORIDE 9 MG/ML
125 INJECTION, SOLUTION INTRAVENOUS CONTINUOUS
Status: DISCONTINUED | OUTPATIENT
Start: 2018-12-16 | End: 2018-12-17

## 2018-12-16 RX ORDER — MORPHINE SULFATE 2 MG/ML
2 INJECTION, SOLUTION INTRAMUSCULAR; INTRAVENOUS ONCE
Status: COMPLETED | OUTPATIENT
Start: 2018-12-16 | End: 2018-12-16

## 2018-12-16 RX ORDER — POTASSIUM CHLORIDE 1.5 G/1.77G
40 POWDER, FOR SOLUTION ORAL AS NEEDED
Status: DISCONTINUED | OUTPATIENT
Start: 2018-12-16 | End: 2018-12-19 | Stop reason: HOSPADM

## 2018-12-16 RX ORDER — ONDANSETRON 2 MG/ML
4 INJECTION INTRAMUSCULAR; INTRAVENOUS ONCE
Status: COMPLETED | OUTPATIENT
Start: 2018-12-16 | End: 2018-12-16

## 2018-12-16 RX ADMIN — MORPHINE SULFATE 2 MG: 2 INJECTION, SOLUTION INTRAMUSCULAR; INTRAVENOUS at 20:58

## 2018-12-16 RX ADMIN — ONDANSETRON 4 MG: 2 INJECTION, SOLUTION INTRAMUSCULAR; INTRAVENOUS at 19:20

## 2018-12-16 RX ADMIN — SODIUM CHLORIDE 1000 ML: 9 INJECTION, SOLUTION INTRAVENOUS at 19:19

## 2018-12-16 RX ADMIN — IOPAMIDOL 90 ML: 612 INJECTION, SOLUTION INTRAVENOUS at 21:34

## 2018-12-16 RX ADMIN — POTASSIUM CHLORIDE 10 MEQ: 7.46 INJECTION, SOLUTION INTRAVENOUS at 22:16

## 2018-12-16 RX ADMIN — POTASSIUM CHLORIDE 10 MEQ: 7.46 INJECTION, SOLUTION INTRAVENOUS at 23:16

## 2018-12-16 RX ADMIN — SODIUM CHLORIDE 125 ML/HR: 9 INJECTION, SOLUTION INTRAVENOUS at 20:50

## 2018-12-16 RX ADMIN — MAGNESIUM SULFATE IN WATER 2 G: 40 INJECTION, SOLUTION INTRAVENOUS at 23:59

## 2018-12-16 RX ADMIN — ONDANSETRON 4 MG: 2 INJECTION, SOLUTION INTRAMUSCULAR; INTRAVENOUS at 20:54

## 2018-12-16 RX ADMIN — PANTOPRAZOLE SODIUM 40 MG: 40 INJECTION, POWDER, FOR SOLUTION INTRAVENOUS at 20:53

## 2018-12-16 NOTE — ED NOTES
Patient states she has taken some 2mg Imodium for her diarrhea today.     Isidoro Fonseca RN  12/16/18 5575

## 2018-12-17 ENCOUNTER — APPOINTMENT (OUTPATIENT)
Dept: CARDIOLOGY | Facility: HOSPITAL | Age: 63
End: 2018-12-17
Attending: HOSPITALIST

## 2018-12-17 LAB
ALBUMIN SERPL-MCNC: 3.9 G/DL (ref 3.4–4.8)
ALBUMIN/GLOB SERPL: 1.3 G/DL (ref 1.5–2.5)
ALP SERPL-CCNC: 100 U/L (ref 35–104)
ALT SERPL W P-5'-P-CCNC: 21 U/L (ref 10–36)
ANION GAP SERPL CALCULATED.3IONS-SCNC: 10.2 MMOL/L (ref 3.6–11.2)
ANISOCYTOSIS BLD QL: NORMAL
AST SERPL-CCNC: 36 U/L (ref 10–30)
BASOPHILS # BLD AUTO: 0.03 10*3/MM3 (ref 0–0.3)
BASOPHILS NFR BLD AUTO: 0.2 % (ref 0–2)
BH CV ECHO MEAS - AO MAX PG (FULL): 5.4 MMHG
BH CV ECHO MEAS - AO MAX PG: 14.4 MMHG
BH CV ECHO MEAS - AO MEAN PG (FULL): 1.4 MMHG
BH CV ECHO MEAS - AO MEAN PG: 7.7 MMHG
BH CV ECHO MEAS - AO ROOT AREA (BSA CORRECTED): 1.6
BH CV ECHO MEAS - AO ROOT AREA: 9.6 CM^2
BH CV ECHO MEAS - AO ROOT DIAM: 3.5 CM
BH CV ECHO MEAS - AO V2 MAX: 189 CM/SEC
BH CV ECHO MEAS - AO V2 MEAN: 130.8 CM/SEC
BH CV ECHO MEAS - AO V2 VTI: 38.1 CM
BH CV ECHO MEAS - BSA(HAYCOCK): 2.3 M^2
BH CV ECHO MEAS - BSA: 2.2 M^2
BH CV ECHO MEAS - BZI_BMI: 32.2 KILOGRAMS/M^2
BH CV ECHO MEAS - BZI_METRIC_HEIGHT: 178 CM
BH CV ECHO MEAS - BZI_METRIC_WEIGHT: 102 KG
BH CV ECHO MEAS - EDV(CUBED): 112.4 ML
BH CV ECHO MEAS - EDV(MOD-SP2): 54.4 ML
BH CV ECHO MEAS - EDV(MOD-SP4): 66.4 ML
BH CV ECHO MEAS - EDV(TEICH): 108.9 ML
BH CV ECHO MEAS - EF(CUBED): 62.8 %
BH CV ECHO MEAS - EF(TEICH): 54.2 %
BH CV ECHO MEAS - ESV(CUBED): 41.8 ML
BH CV ECHO MEAS - ESV(TEICH): 49.8 ML
BH CV ECHO MEAS - FS: 28.1 %
BH CV ECHO MEAS - IVS/LVPW: 0.8
BH CV ECHO MEAS - IVSD: 0.86 CM
BH CV ECHO MEAS - LA DIMENSION: 3.4 CM
BH CV ECHO MEAS - LA/AO: 0.97
BH CV ECHO MEAS - LV DIASTOLIC VOL/BSA (35-75): 30.2 ML/M^2
BH CV ECHO MEAS - LV MASS(C)D: 164.7 GRAMS
BH CV ECHO MEAS - LV MASS(C)DI: 75 GRAMS/M^2
BH CV ECHO MEAS - LV MAX PG: 9 MMHG
BH CV ECHO MEAS - LV MEAN PG: 6.3 MMHG
BH CV ECHO MEAS - LV V1 MAX: 150 CM/SEC
BH CV ECHO MEAS - LV V1 MEAN: 123 CM/SEC
BH CV ECHO MEAS - LV V1 VTI: 28.2 CM
BH CV ECHO MEAS - LVIDD: 4.8 CM
BH CV ECHO MEAS - LVIDS: 3.5 CM
BH CV ECHO MEAS - LVPWD: 1.1 CM
BH CV ECHO MEAS - MV A MAX VEL: 78.6 CM/SEC
BH CV ECHO MEAS - MV E MAX VEL: 78 CM/SEC
BH CV ECHO MEAS - MV E/A: 0.99
BH CV ECHO MEAS - PA ACC TIME: 0.12 SEC
BH CV ECHO MEAS - PA MAX PG: 3.6 MMHG
BH CV ECHO MEAS - PA MEAN PG: 2.2 MMHG
BH CV ECHO MEAS - PA PR(ACCEL): 26.4 MMHG
BH CV ECHO MEAS - PA V2 MAX: 95.5 CM/SEC
BH CV ECHO MEAS - PA V2 MEAN: 73.3 CM/SEC
BH CV ECHO MEAS - PA V2 VTI: 20.1 CM
BH CV ECHO MEAS - RAP SYSTOLE: 10 MMHG
BH CV ECHO MEAS - RVSP: 21.6 MMHG
BH CV ECHO MEAS - SI(AO): 167 ML/M^2
BH CV ECHO MEAS - SI(CUBED): 32.2 ML/M^2
BH CV ECHO MEAS - SI(TEICH): 26.9 ML/M^2
BH CV ECHO MEAS - SV(AO): 366.6 ML
BH CV ECHO MEAS - SV(CUBED): 70.6 ML
BH CV ECHO MEAS - SV(TEICH): 59 ML
BH CV ECHO MEAS - TR MAX VEL: 170.1 CM/SEC
BILIRUB SERPL-MCNC: 0.2 MG/DL (ref 0.2–1.8)
BNP SERPL-MCNC: 135 PG/ML (ref 0–100)
BUN BLD-MCNC: 9 MG/DL (ref 7–21)
BUN/CREAT SERPL: 14.5 (ref 7–25)
CALCIUM SPEC-SCNC: 8.6 MG/DL (ref 7.7–10)
CHLORIDE SERPL-SCNC: 89 MMOL/L (ref 99–112)
CK MB SERPL-CCNC: 9.93 NG/ML (ref 0–5)
CK MB SERPL-RTO: 12 % (ref 0–3)
CK SERPL-CCNC: 83 U/L (ref 24–173)
CO2 SERPL-SCNC: 31.8 MMOL/L (ref 24.3–31.9)
CREAT BLD-MCNC: 0.62 MG/DL (ref 0.43–1.29)
DEPRECATED RDW RBC AUTO: 42.4 FL (ref 37–54)
EOSINOPHIL # BLD AUTO: 0 10*3/MM3 (ref 0–0.7)
EOSINOPHIL NFR BLD AUTO: 0 % (ref 0–5)
ERYTHROCYTE [DISTWIDTH] IN BLOOD BY AUTOMATED COUNT: 17.1 % (ref 11.5–14.5)
FOLATE SERPL-MCNC: 12.88 NG/ML (ref 5.4–20)
GFR SERPL CREATININE-BSD FRML MDRD: 98 ML/MIN/1.73
GLOBULIN UR ELPH-MCNC: 2.9 GM/DL
GLUCOSE BLD-MCNC: 181 MG/DL (ref 70–110)
HBA1C MFR BLD: 7.4 % (ref 4.5–5.7)
HCT VFR BLD AUTO: 30.5 % (ref 37–47)
HGB BLD-MCNC: 8.8 G/DL (ref 12–16)
HYPOCHROMIA BLD QL: NORMAL
IMM GRANULOCYTES # BLD: 0.02 10*3/MM3 (ref 0–0.03)
IMM GRANULOCYTES NFR BLD: 0.2 % (ref 0–0.5)
INR PPP: 1.23 (ref 0.9–1.1)
IRON 24H UR-MRATE: 18 MCG/DL (ref 49–151)
IRON SATN MFR SERPL: 5 % (ref 15–50)
LYMPHOCYTES # BLD AUTO: 2.09 10*3/MM3 (ref 1–3)
LYMPHOCYTES NFR BLD AUTO: 17.1 % (ref 21–51)
MAGNESIUM SERPL-MCNC: 2.8 MG/DL (ref 1.7–2.6)
MAXIMAL PREDICTED HEART RATE: 159 BPM
MCH RBC QN AUTO: 20.1 PG (ref 27–33)
MCHC RBC AUTO-ENTMCNC: 28.9 G/DL (ref 33–37)
MCV RBC AUTO: 69.8 FL (ref 80–94)
MICROCYTES BLD QL: NORMAL
MONOCYTES # BLD AUTO: 1.44 10*3/MM3 (ref 0.1–0.9)
MONOCYTES NFR BLD AUTO: 11.8 % (ref 0–10)
MYOGLOBIN SERPL-MCNC: 66 NG/ML (ref 0–109)
NEUTROPHILS # BLD AUTO: 8.64 10*3/MM3 (ref 1.4–6.5)
NEUTROPHILS NFR BLD AUTO: 70.7 % (ref 30–70)
OSMOLALITY SERPL CALC.SUM OF ELEC: 265.9 MOSM/KG (ref 273–305)
PLATELET # BLD AUTO: 613 10*3/MM3 (ref 130–400)
PMV BLD AUTO: 9.1 FL (ref 6–10)
POTASSIUM BLD-SCNC: 2.3 MMOL/L (ref 3.5–5.3)
POTASSIUM BLD-SCNC: 2.8 MMOL/L (ref 3.5–5.3)
POTASSIUM BLD-SCNC: 2.9 MMOL/L (ref 3.5–5.3)
PROT SERPL-MCNC: 6.8 G/DL (ref 6–8)
PROTHROMBIN TIME: 15.7 SECONDS (ref 11–15.4)
RBC # BLD AUTO: 4.37 10*6/MM3 (ref 4.2–5.4)
SMALL PLATELETS BLD QL SMEAR: NORMAL
SODIUM BLD-SCNC: 131 MMOL/L (ref 135–153)
STOMATOCYTES BLD QL SMEAR: NORMAL
STRESS TARGET HR: 135 BPM
TIBC SERPL-MCNC: 391 MCG/DL (ref 241–421)
TROPONIN I SERPL-MCNC: 0.1 NG/ML
TROPONIN I SERPL-MCNC: 0.14 NG/ML
VIT B12 BLD-MCNC: 650 PG/ML (ref 211–911)
WBC NRBC COR # BLD: 12.22 10*3/MM3 (ref 4.5–12.5)

## 2018-12-17 PROCEDURE — 82553 CREATINE MB FRACTION: CPT | Performed by: HOSPITALIST

## 2018-12-17 PROCEDURE — 94799 UNLISTED PULMONARY SVC/PX: CPT

## 2018-12-17 PROCEDURE — 83735 ASSAY OF MAGNESIUM: CPT | Performed by: HOSPITALIST

## 2018-12-17 PROCEDURE — 25010000002 MAGNESIUM SULFATE 2 GM/50ML SOLUTION: Performed by: HOSPITALIST

## 2018-12-17 PROCEDURE — 83874 ASSAY OF MYOGLOBIN: CPT | Performed by: HOSPITALIST

## 2018-12-17 PROCEDURE — 80053 COMPREHEN METABOLIC PANEL: CPT | Performed by: HOSPITALIST

## 2018-12-17 PROCEDURE — 99223 1ST HOSP IP/OBS HIGH 75: CPT | Performed by: HOSPITALIST

## 2018-12-17 PROCEDURE — 85025 COMPLETE CBC W/AUTO DIFF WBC: CPT | Performed by: HOSPITALIST

## 2018-12-17 PROCEDURE — 80183 DRUG SCRN QUANT OXCARBAZEPIN: CPT | Performed by: HOSPITALIST

## 2018-12-17 PROCEDURE — 82550 ASSAY OF CK (CPK): CPT | Performed by: HOSPITALIST

## 2018-12-17 PROCEDURE — 85007 BL SMEAR W/DIFF WBC COUNT: CPT | Performed by: HOSPITALIST

## 2018-12-17 PROCEDURE — 94640 AIRWAY INHALATION TREATMENT: CPT

## 2018-12-17 PROCEDURE — 25010000002 MORPHINE (PF) 10 MG/ML SOLUTION: Performed by: HOSPITALIST

## 2018-12-17 PROCEDURE — 80157 ASSAY CARBAMAZEPINE FREE: CPT | Performed by: INTERNAL MEDICINE

## 2018-12-17 PROCEDURE — 85610 PROTHROMBIN TIME: CPT | Performed by: HOSPITALIST

## 2018-12-17 PROCEDURE — 25010000003 POTASSIUM CHLORIDE 10 MEQ/100ML SOLUTION: Performed by: HOSPITALIST

## 2018-12-17 PROCEDURE — 25010000003 POTASSIUM CHLORIDE 10 MEQ/100ML SOLUTION: Performed by: EMERGENCY MEDICINE

## 2018-12-17 PROCEDURE — 82746 ASSAY OF FOLIC ACID SERUM: CPT | Performed by: HOSPITALIST

## 2018-12-17 PROCEDURE — 83550 IRON BINDING TEST: CPT | Performed by: HOSPITALIST

## 2018-12-17 PROCEDURE — 84484 ASSAY OF TROPONIN QUANT: CPT | Performed by: HOSPITALIST

## 2018-12-17 PROCEDURE — 83880 ASSAY OF NATRIURETIC PEPTIDE: CPT | Performed by: HOSPITALIST

## 2018-12-17 PROCEDURE — 84132 ASSAY OF SERUM POTASSIUM: CPT | Performed by: INTERNAL MEDICINE

## 2018-12-17 PROCEDURE — 83540 ASSAY OF IRON: CPT | Performed by: HOSPITALIST

## 2018-12-17 PROCEDURE — 82607 VITAMIN B-12: CPT | Performed by: HOSPITALIST

## 2018-12-17 PROCEDURE — 83036 HEMOGLOBIN GLYCOSYLATED A1C: CPT | Performed by: HOSPITALIST

## 2018-12-17 PROCEDURE — G0108 DIAB MANAGE TRN  PER INDIV: HCPCS

## 2018-12-17 PROCEDURE — 93306 TTE W/DOPPLER COMPLETE: CPT | Performed by: INTERNAL MEDICINE

## 2018-12-17 PROCEDURE — 25810000003 SODIUM CHLORIDE 0.9 % WITH KCL 20 MEQ 20-0.9 MEQ/L-% SOLUTION: Performed by: INTERNAL MEDICINE

## 2018-12-17 PROCEDURE — 80156 ASSAY CARBAMAZEPINE TOTAL: CPT | Performed by: INTERNAL MEDICINE

## 2018-12-17 PROCEDURE — 93306 TTE W/DOPPLER COMPLETE: CPT

## 2018-12-17 PROCEDURE — 25010000002 IRON SUCROSE PER 1 MG: Performed by: INTERNAL MEDICINE

## 2018-12-17 RX ORDER — NITROGLYCERIN 0.4 MG/1
0.4 TABLET SUBLINGUAL
Status: DISCONTINUED | OUTPATIENT
Start: 2018-12-17 | End: 2018-12-19 | Stop reason: HOSPADM

## 2018-12-17 RX ORDER — ALBUTEROL SULFATE 2.5 MG/3ML
2.5 SOLUTION RESPIRATORY (INHALATION) EVERY 6 HOURS PRN
Status: DISCONTINUED | OUTPATIENT
Start: 2018-12-17 | End: 2018-12-19 | Stop reason: HOSPADM

## 2018-12-17 RX ORDER — ASPIRIN 81 MG/1
81 TABLET ORAL DAILY
Status: CANCELLED | OUTPATIENT
Start: 2018-12-17

## 2018-12-17 RX ORDER — PANTOPRAZOLE SODIUM 40 MG/1
40 TABLET, DELAYED RELEASE ORAL 2 TIMES DAILY
COMMUNITY

## 2018-12-17 RX ORDER — LISINOPRIL 20 MG/1
20 TABLET ORAL DAILY
COMMUNITY
End: 2018-12-19 | Stop reason: HOSPADM

## 2018-12-17 RX ORDER — OXCARBAZEPINE 300 MG/1
600 TABLET, FILM COATED ORAL EVERY 12 HOURS SCHEDULED
Status: DISCONTINUED | OUTPATIENT
Start: 2018-12-17 | End: 2018-12-19 | Stop reason: HOSPADM

## 2018-12-17 RX ORDER — ASPIRIN 81 MG/1
81 TABLET ORAL DAILY
Status: DISCONTINUED | OUTPATIENT
Start: 2018-12-17 | End: 2018-12-19

## 2018-12-17 RX ORDER — CETIRIZINE HYDROCHLORIDE 10 MG/1
10 TABLET ORAL NIGHTLY
COMMUNITY

## 2018-12-17 RX ORDER — POTASSIUM CHLORIDE 7.45 MG/ML
10 INJECTION INTRAVENOUS
Status: COMPLETED | OUTPATIENT
Start: 2018-12-17 | End: 2018-12-17

## 2018-12-17 RX ORDER — PANTOPRAZOLE SODIUM 40 MG/1
40 TABLET, DELAYED RELEASE ORAL 2 TIMES DAILY
Status: CANCELLED | OUTPATIENT
Start: 2018-12-17

## 2018-12-17 RX ORDER — MONTELUKAST SODIUM 10 MG/1
10 TABLET ORAL NIGHTLY
Status: DISCONTINUED | OUTPATIENT
Start: 2018-12-17 | End: 2018-12-19 | Stop reason: HOSPADM

## 2018-12-17 RX ORDER — MONTELUKAST SODIUM 10 MG/1
10 TABLET ORAL NIGHTLY
Status: CANCELLED | OUTPATIENT
Start: 2018-12-17

## 2018-12-17 RX ORDER — LISINOPRIL 10 MG/1
20 TABLET ORAL DAILY
Status: CANCELLED | OUTPATIENT
Start: 2018-12-17

## 2018-12-17 RX ORDER — SODIUM CHLORIDE AND POTASSIUM CHLORIDE 150; 900 MG/100ML; MG/100ML
20 INJECTION, SOLUTION INTRAVENOUS CONTINUOUS
Status: DISCONTINUED | OUTPATIENT
Start: 2018-12-17 | End: 2018-12-19

## 2018-12-17 RX ORDER — AMLODIPINE BESYLATE 5 MG/1
5 TABLET ORAL DAILY
Status: DISCONTINUED | OUTPATIENT
Start: 2018-12-17 | End: 2018-12-19 | Stop reason: HOSPADM

## 2018-12-17 RX ORDER — POTASSIUM CHLORIDE 20 MEQ/1
40 TABLET, EXTENDED RELEASE ORAL EVERY 4 HOURS
Status: COMPLETED | OUTPATIENT
Start: 2018-12-17 | End: 2018-12-18

## 2018-12-17 RX ORDER — OXYCODONE HYDROCHLORIDE 5 MG/1
10 TABLET ORAL EVERY 6 HOURS PRN
Status: DISCONTINUED | OUTPATIENT
Start: 2018-12-17 | End: 2018-12-19 | Stop reason: HOSPADM

## 2018-12-17 RX ORDER — OXYCODONE HYDROCHLORIDE 5 MG/1
10 TABLET ORAL EVERY 6 HOURS PRN
Status: CANCELLED | OUTPATIENT
Start: 2018-12-17

## 2018-12-17 RX ORDER — GABAPENTIN 300 MG/1
600 CAPSULE ORAL EVERY 8 HOURS SCHEDULED
Status: CANCELLED | OUTPATIENT
Start: 2018-12-17

## 2018-12-17 RX ORDER — PANTOPRAZOLE SODIUM 40 MG/10ML
40 INJECTION, POWDER, LYOPHILIZED, FOR SOLUTION INTRAVENOUS
Status: DISCONTINUED | OUTPATIENT
Start: 2018-12-17 | End: 2018-12-17

## 2018-12-17 RX ORDER — FLUOXETINE HYDROCHLORIDE 20 MG/1
20 CAPSULE ORAL DAILY
Status: CANCELLED | OUTPATIENT
Start: 2018-12-17

## 2018-12-17 RX ORDER — CETIRIZINE HYDROCHLORIDE 10 MG/1
10 TABLET ORAL NIGHTLY
Status: CANCELLED | OUTPATIENT
Start: 2018-12-17

## 2018-12-17 RX ORDER — OXCARBAZEPINE 300 MG/1
600 TABLET, FILM COATED ORAL EVERY 12 HOURS SCHEDULED
Status: CANCELLED | OUTPATIENT
Start: 2018-12-17

## 2018-12-17 RX ORDER — GABAPENTIN 400 MG/1
400 CAPSULE ORAL EVERY 8 HOURS SCHEDULED
Status: DISCONTINUED | OUTPATIENT
Start: 2018-12-17 | End: 2018-12-19 | Stop reason: HOSPADM

## 2018-12-17 RX ORDER — IPRATROPIUM BROMIDE AND ALBUTEROL SULFATE 2.5; .5 MG/3ML; MG/3ML
3 SOLUTION RESPIRATORY (INHALATION) EVERY 6 HOURS PRN
Status: DISCONTINUED | OUTPATIENT
Start: 2018-12-17 | End: 2018-12-19 | Stop reason: HOSPADM

## 2018-12-17 RX ORDER — ALBUTEROL SULFATE 2.5 MG/3ML
2.5 SOLUTION RESPIRATORY (INHALATION) EVERY 4 HOURS PRN
Status: CANCELLED | OUTPATIENT
Start: 2018-12-17

## 2018-12-17 RX ORDER — AMLODIPINE BESYLATE 5 MG/1
5 TABLET ORAL DAILY
Status: CANCELLED | OUTPATIENT
Start: 2018-12-17

## 2018-12-17 RX ORDER — MORPHINE SULFATE 10 MG/ML
1 INJECTION, SOLUTION INTRAMUSCULAR; INTRAVENOUS EVERY 4 HOURS PRN
Status: DISCONTINUED | OUTPATIENT
Start: 2018-12-17 | End: 2018-12-19 | Stop reason: HOSPADM

## 2018-12-17 RX ORDER — OXCARBAZEPINE 600 MG/1
600 TABLET, FILM COATED ORAL 2 TIMES DAILY
COMMUNITY

## 2018-12-17 RX ORDER — POTASSIUM CHLORIDE 20 MEQ/1
40 TABLET, EXTENDED RELEASE ORAL
Status: COMPLETED | OUTPATIENT
Start: 2018-12-17 | End: 2018-12-17

## 2018-12-17 RX ORDER — CETIRIZINE HYDROCHLORIDE 10 MG/1
10 TABLET ORAL NIGHTLY
Status: DISCONTINUED | OUTPATIENT
Start: 2018-12-17 | End: 2018-12-19 | Stop reason: HOSPADM

## 2018-12-17 RX ORDER — PANTOPRAZOLE SODIUM 40 MG/10ML
40 INJECTION, POWDER, LYOPHILIZED, FOR SOLUTION INTRAVENOUS
Status: DISCONTINUED | OUTPATIENT
Start: 2018-12-17 | End: 2018-12-19 | Stop reason: HOSPADM

## 2018-12-17 RX ORDER — SUCRALFATE 1 G/1
1 TABLET ORAL 4 TIMES DAILY
COMMUNITY

## 2018-12-17 RX ORDER — FLUOXETINE HYDROCHLORIDE 20 MG/1
20 CAPSULE ORAL DAILY
Status: ON HOLD | COMMUNITY
End: 2019-04-29

## 2018-12-17 RX ORDER — ASPIRIN 81 MG/1
81 TABLET ORAL DAILY
COMMUNITY
End: 2019-01-15

## 2018-12-17 RX ORDER — SODIUM CHLORIDE 0.9 % (FLUSH) 0.9 %
3 SYRINGE (ML) INJECTION EVERY 12 HOURS SCHEDULED
Status: DISCONTINUED | OUTPATIENT
Start: 2018-12-17 | End: 2018-12-19 | Stop reason: HOSPADM

## 2018-12-17 RX ORDER — OXYCODONE HYDROCHLORIDE 10 MG/1
10 TABLET ORAL EVERY 6 HOURS PRN
COMMUNITY

## 2018-12-17 RX ORDER — TRAZODONE HYDROCHLORIDE 50 MG/1
50 TABLET ORAL NIGHTLY
COMMUNITY
End: 2019-04-22

## 2018-12-17 RX ORDER — SODIUM CHLORIDE 0.9 % (FLUSH) 0.9 %
3-10 SYRINGE (ML) INJECTION AS NEEDED
Status: DISCONTINUED | OUTPATIENT
Start: 2018-12-17 | End: 2018-12-19 | Stop reason: HOSPADM

## 2018-12-17 RX ORDER — GABAPENTIN 300 MG/1
600 CAPSULE ORAL EVERY 8 HOURS SCHEDULED
Status: DISCONTINUED | OUTPATIENT
Start: 2018-12-17 | End: 2018-12-17

## 2018-12-17 RX ORDER — MAGNESIUM SULFATE HEPTAHYDRATE 40 MG/ML
2 INJECTION, SOLUTION INTRAVENOUS
Status: COMPLETED | OUTPATIENT
Start: 2018-12-17 | End: 2018-12-17

## 2018-12-17 RX ORDER — SUCRALFATE 1 G/1
1 TABLET ORAL 4 TIMES DAILY
Status: DISCONTINUED | OUTPATIENT
Start: 2018-12-17 | End: 2018-12-19 | Stop reason: HOSPADM

## 2018-12-17 RX ORDER — TRAZODONE HYDROCHLORIDE 50 MG/1
50 TABLET ORAL NIGHTLY PRN
Status: DISCONTINUED | OUTPATIENT
Start: 2018-12-17 | End: 2018-12-19 | Stop reason: HOSPADM

## 2018-12-17 RX ORDER — FLUOXETINE HYDROCHLORIDE 20 MG/1
20 CAPSULE ORAL DAILY
Status: DISCONTINUED | OUTPATIENT
Start: 2018-12-17 | End: 2018-12-19 | Stop reason: HOSPADM

## 2018-12-17 RX ORDER — AMLODIPINE BESYLATE 5 MG/1
5 TABLET ORAL DAILY
COMMUNITY

## 2018-12-17 RX ADMIN — SUCRALFATE 1 G: 1 TABLET ORAL at 21:27

## 2018-12-17 RX ADMIN — FLUOXETINE 20 MG: 20 CAPSULE ORAL at 13:02

## 2018-12-17 RX ADMIN — POTASSIUM CHLORIDE 40 MEQ: 1500 TABLET, EXTENDED RELEASE ORAL at 11:37

## 2018-12-17 RX ADMIN — PANTOPRAZOLE SODIUM 40 MG: 40 INJECTION, POWDER, FOR SOLUTION INTRAVENOUS at 06:43

## 2018-12-17 RX ADMIN — AMLODIPINE BESYLATE 5 MG: 5 TABLET ORAL at 13:02

## 2018-12-17 RX ADMIN — SODIUM CHLORIDE 125 ML/HR: 9 INJECTION, SOLUTION INTRAVENOUS at 05:47

## 2018-12-17 RX ADMIN — ASPIRIN 81 MG: 81 TABLET ORAL at 13:02

## 2018-12-17 RX ADMIN — POTASSIUM CHLORIDE 40 MEQ: 1500 TABLET, EXTENDED RELEASE ORAL at 21:27

## 2018-12-17 RX ADMIN — MONTELUKAST SODIUM 10 MG: 10 TABLET, COATED ORAL at 21:27

## 2018-12-17 RX ADMIN — SODIUM CHLORIDE, PRESERVATIVE FREE 3 ML: 5 INJECTION INTRAVENOUS at 07:36

## 2018-12-17 RX ADMIN — PANTOPRAZOLE SODIUM 40 MG: 40 INJECTION, POWDER, FOR SOLUTION INTRAVENOUS at 16:39

## 2018-12-17 RX ADMIN — POTASSIUM CHLORIDE 10 MEQ: 10 INJECTION, SOLUTION INTRAVENOUS at 04:18

## 2018-12-17 RX ADMIN — POTASSIUM CHLORIDE 10 MEQ: 10 INJECTION, SOLUTION INTRAVENOUS at 02:11

## 2018-12-17 RX ADMIN — OXCARBAZEPINE 600 MG: 300 TABLET, FILM COATED ORAL at 21:27

## 2018-12-17 RX ADMIN — CETIRIZINE HYDROCHLORIDE 10 MG: 10 TABLET, FILM COATED ORAL at 21:27

## 2018-12-17 RX ADMIN — GABAPENTIN 400 MG: 400 CAPSULE ORAL at 13:02

## 2018-12-17 RX ADMIN — POTASSIUM CHLORIDE 10 MEQ: 7.46 INJECTION, SOLUTION INTRAVENOUS at 00:16

## 2018-12-17 RX ADMIN — GABAPENTIN 400 MG: 400 CAPSULE ORAL at 21:27

## 2018-12-17 RX ADMIN — MAGNESIUM SULFATE IN WATER 2 G: 40 INJECTION, SOLUTION INTRAVENOUS at 04:19

## 2018-12-17 RX ADMIN — OXCARBAZEPINE 600 MG: 300 TABLET, FILM COATED ORAL at 07:36

## 2018-12-17 RX ADMIN — MORPHINE SULFATE 1 MG: 10 INJECTION INTRAVENOUS at 21:46

## 2018-12-17 RX ADMIN — SODIUM CHLORIDE AND POTASSIUM CHLORIDE 100 ML/HR: 9; 1.49 INJECTION, SOLUTION INTRAVENOUS at 21:27

## 2018-12-17 RX ADMIN — OXYCODONE HYDROCHLORIDE 10 MG: 5 TABLET ORAL at 16:53

## 2018-12-17 RX ADMIN — IRON SUCROSE 200 MG: 20 INJECTION, SOLUTION INTRAVENOUS at 09:27

## 2018-12-17 RX ADMIN — SUCRALFATE 1 G: 1 TABLET ORAL at 13:02

## 2018-12-17 RX ADMIN — POTASSIUM CHLORIDE 40 MEQ: 1500 TABLET, EXTENDED RELEASE ORAL at 13:02

## 2018-12-17 RX ADMIN — SUCRALFATE 1 G: 1 TABLET ORAL at 16:39

## 2018-12-17 RX ADMIN — IPRATROPIUM BROMIDE AND ALBUTEROL SULFATE 3 ML: .5; 3 SOLUTION RESPIRATORY (INHALATION) at 18:23

## 2018-12-17 RX ADMIN — SODIUM CHLORIDE, PRESERVATIVE FREE 3 ML: 5 INJECTION INTRAVENOUS at 21:28

## 2018-12-17 RX ADMIN — SODIUM CHLORIDE AND POTASSIUM CHLORIDE 100 ML/HR: 9; 1.49 INJECTION, SOLUTION INTRAVENOUS at 09:27

## 2018-12-17 RX ADMIN — MAGNESIUM SULFATE IN WATER 2 G: 40 INJECTION, SOLUTION INTRAVENOUS at 02:11

## 2018-12-17 RX ADMIN — MORPHINE SULFATE 1 MG: 10 INJECTION INTRAVENOUS at 04:22

## 2018-12-17 RX ADMIN — POTASSIUM CHLORIDE 10 MEQ: 10 INJECTION, SOLUTION INTRAVENOUS at 03:16

## 2018-12-17 RX ADMIN — IPRATROPIUM BROMIDE AND ALBUTEROL SULFATE 3 ML: .5; 3 SOLUTION RESPIRATORY (INHALATION) at 06:38

## 2018-12-17 RX ADMIN — SODIUM CHLORIDE, PRESERVATIVE FREE 3 ML: 5 INJECTION INTRAVENOUS at 02:10

## 2018-12-17 NOTE — CONSULTS
Pt currently laying in bed, alert and oriented.  Pt son at bedside.  Pt reports she was dx 3mo ago and has since lost weight.  Pt reports she does not check her blood sugar at home and only takes Metformin.  Couns pt on signs and symptoms of hyperglycemia and hypoglycemia.  Pt verbalizes understanding.  Couns on benefits of healthy eating watching carb and sugar intake on blood glucose.  Referred to outpatient diabetes classes.  Left my contact information with pt.  Will Continue to monitor as needed.

## 2018-12-17 NOTE — ED NOTES
Explained CT of abdomen/pelvis with IV contrast dye at this time with the patient and patients son. Patient reports that she has had  contrast dye in the past with no issues. Explained all risks and benefits. Patient verbalized understanding and denies any questions. Patient agreeable to have exam performed; contrast consent was signed at this time and placed on the chart.      Manju Pollard RN  12/16/18 6402

## 2018-12-17 NOTE — PROGRESS NOTES
*Patient seen and examined reviewed her progression, complained of nausea vomiting and diarrhea for the past 3 months, history from her is really challenging, patient is a bad historian  *She was worked up in the outpatient setting for the above symptoms, not sure what she had done, seems that she had an EGD done that showe peptic ulcer, an abdominal ultrasound that was unremarkable   *Examination did not reveal any acute pathology except of paraumbilical hernia that's reducible, bowel sounds present in the 4 quadrants normoactive no organomegaly  *Cardio pulmonary examination was unremarkable    -History of upper GI bleed from peptic ulcer disease, seems to be chronic   -Chronic iron deficiency anemia, ferrous saturation is 5%   -Chronic upper GI symptoms, with the diarrhea, if the area is a 2 cannot be explained by the hiatal hernia she had by the CAT scan   -Large hiatal hernia, for surgical evaluation   -History of seizure disorder on oxcarbazepine and gabapentin   -Severe hypokalemia, due to GI loss, calculated potassium deficit is 1100 mEq that needs to be placed slowly over the next 3 days    --Will give the patient 3 doses of IV Venofer 200 mg a day   --PPI increased to IV twice a day   --Will send stool studies and stool cultures, if the patient has a true diarrhea before proceeding with any surgical interventions needs to rule out a primary GI pathology / medications side effects with polypharmacy needs to be ruled out , see orders  --Start the patient on clear liquid diet   --Check Tegretol free and total levels  --See orders for electrolytes replacement

## 2018-12-17 NOTE — ED NOTES
Patient is lying in ED stretcher at this time in no acute distress with her son at bedside. Patient explains that she is blind and cannot hear well from left ear. The patient reports that she was diagnosed with a hiatal hernia in April, but that they told her she was too overweight for the surgery then. States that she had a follow up appt after losing around 40 lbs in Sept and they did not mention the surgery. States that for the past month she has had nausea, vomiting, diarrhea and epigastric pain. States that last night, her sister told her that her stool appeared to have blood in it. The patient VS is stable. The patient denies any needs. Updated on the plan of care and the reason for wait. Will continue to monitor.      Manju Pollard RN  12/16/18 3496

## 2018-12-17 NOTE — ED PROVIDER NOTES
Subjective   Past medical history of blindness, seizures, hiatal hernia.  Presenting today with chronic at least 3 month history of nausea vomiting and some diarrhea.  The nausea and vomiting apparently occur immediately after eating.  The patient has been seen here before for GI bleed and was sent to [place].  An EGD was performed and it was discovered the patient had a hiatal hernia.  At the time the patient is having similar symptoms and was planned to perform a Nissen  however they told per that she needs to lose some weight.  Per family and patient she has lost 50 pounds in the past 3 months or so, states that she is barely able to keep any food down.  Patient was seen a primary care office recently and was actually given a liter of fluid before being sent home.  Today the patient complains of continued symptoms and states that her epigastric pain is worse normal.  Denies any fevers/chills, CP, SOB, headache, near syncope, she symptoms or blood in her emesis or bowel movements.        Nausea   The primary symptoms include abdominal pain, nausea and vomiting. The illness began more than 7 days ago.   The illness does not include chills, anorexia, dysphagia, odynophagia, bloating, constipation, tenesmus, back pain or itching. Associated symptoms comments: Epigastric abdominal pain .       Review of Systems   Constitutional: Positive for unexpected weight change. Negative for chills.   HENT: Negative.    Eyes: Negative.    Respiratory: Negative.    Cardiovascular: Negative.    Gastrointestinal: Positive for abdominal pain, nausea and vomiting. Negative for anorexia, bloating, constipation and dysphagia.   Endocrine: Negative.    Genitourinary: Negative.    Musculoskeletal: Negative.  Negative for back pain.   Skin: Negative.  Negative for itching.   Allergic/Immunologic: Negative.    Neurological: Negative.    Hematological: Negative.    Psychiatric/Behavioral: Negative.        Past Medical History:   Diagnosis  Date   • Arthritis    • Asthma    • Blind    • Hypertension    • Low back pain    • Seizures (CMS/HCC)    • Stomach ulcer        Allergies   Allergen Reactions   • Zithromax [Azithromycin] Hives   • Codeine Rash   • Penicillins Rash   • Phenobarbital Anxiety       Past Surgical History:   Procedure Laterality Date   • APPENDECTOMY     • BREAST SURGERY      reduction   • CHOLECYSTECTOMY     • HYSTERECTOMY         Family History   Problem Relation Age of Onset   • Cancer Mother    • Diabetes Mother    • Hypertension Mother    • Hypertension Father    • Heart disease Father        Social History     Socioeconomic History   • Marital status:      Spouse name: Not on file   • Number of children: Not on file   • Years of education: Not on file   • Highest education level: Not on file   Tobacco Use   • Smoking status: Never Smoker   • Smokeless tobacco: Never Used   Substance and Sexual Activity   • Alcohol use: No   • Drug use: No   • Sexual activity: Defer           Objective   Physical Exam   Constitutional: She is oriented to person, place, and time. She appears well-developed and well-nourished.   HENT:   Head: Normocephalic and atraumatic.   Eyes: Conjunctivae are normal.   Neck: Normal range of motion. Neck supple. No JVD present. No tracheal deviation present. No thyromegaly present.   Cardiovascular: Normal rate, regular rhythm, normal heart sounds and intact distal pulses. Exam reveals no gallop and no friction rub.   No murmur heard.  Pulmonary/Chest: Effort normal and breath sounds normal. No stridor. No respiratory distress.   Abdominal: She exhibits no mass. There is tenderness. There is no rebound and no guarding.   Bloated    Musculoskeletal: Normal range of motion. She exhibits no edema or deformity.   Neurological: She is alert and oriented to person, place, and time. No cranial nerve deficit. Coordination normal.   Skin: Skin is warm and dry. She is not diaphoretic.   Psychiatric: She has a normal  mood and affect. Her behavior is normal. Judgment and thought content normal.       Procedures  EKG shows sinus rhythm with a rate of 69.  Nonspecific ST-T abnormality.  XR Chest 1 View   ED Interpretation   No apparent acute disease or significant change from 4/4/18 pending radiologist review.      CT Abdomen Pelvis With Contrast   ED Interpretation   Per virtual radiology, scattered fluid within the small bowel and in the ascending colon, may be mild mucosal thickening of the proximal ascending colon or secondary to under distention, uncomplicated enteritis is not excluded.  No other acute abnormalities are described in the report.  In the lower thorax a moderate sliding hiatal hernia is seen containing proximal stomach.        Results for orders placed or performed during the hospital encounter of 12/16/18   Comprehensive Metabolic Panel   Result Value Ref Range    Glucose 118 (H) 70 - 110 mg/dL    BUN 13 7 - 21 mg/dL    Creatinine 0.63 0.43 - 1.29 mg/dL    Sodium 132 (L) 135 - 153 mmol/L    Potassium 2.3 (C) 3.5 - 5.3 mmol/L    Chloride 89 (L) 99 - 112 mmol/L    CO2 33.9 (H) 24.3 - 31.9 mmol/L    Calcium 8.4 7.7 - 10.0 mg/dL    Total Protein 6.5 6.0 - 8.0 g/dL    Albumin 3.60 3.40 - 4.80 g/dL    ALT (SGPT) 17 10 - 36 U/L    AST (SGOT) 33 (H) 10 - 30 U/L    Alkaline Phosphatase 93 35 - 104 U/L    Total Bilirubin 0.2 0.2 - 1.8 mg/dL    eGFR Non African Amer 96 >60 mL/min/1.73    Globulin 2.9 gm/dL    A/G Ratio 1.2 (L) 1.5 - 2.5 g/dL    BUN/Creatinine Ratio 20.6 7.0 - 25.0    Anion Gap 9.1 3.6 - 11.2 mmol/L   Lipase   Result Value Ref Range    Lipase 21 13 - 60 U/L   Troponin   Result Value Ref Range    Troponin I 0.069 (H) <=0.040 ng/mL   CBC Auto Differential   Result Value Ref Range    WBC 11.32 4.50 - 12.50 10*3/mm3    RBC 4.69 4.20 - 5.40 10*6/mm3    Hemoglobin 9.5 (L) 12.0 - 16.0 g/dL    Hematocrit 32.8 (L) 37.0 - 47.0 %    MCV 69.9 (L) 80.0 - 94.0 fL    MCH 20.3 (L) 27.0 - 33.0 pg    MCHC 29.0 (L) 33.0 -  37.0 g/dL    RDW 16.8 (H) 11.5 - 14.5 %    RDW-SD 42.4 37.0 - 54.0 fl    MPV 8.9 6.0 - 10.0 fL    Platelets 504 (H) 130 - 400 10*3/mm3    Neutrophil % 61.3 30.0 - 70.0 %    Lymphocyte % 24.7 21.0 - 51.0 %    Monocyte % 13.3 (H) 0.0 - 10.0 %    Eosinophil % 0.1 0.0 - 5.0 %    Basophil % 0.3 0.0 - 2.0 %    Immature Grans % 0.3 0.0 - 0.5 %    Neutrophils, Absolute 6.95 (H) 1.40 - 6.50 10*3/mm3    Lymphocytes, Absolute 2.80 1.00 - 3.00 10*3/mm3    Monocytes, Absolute 1.50 (H) 0.10 - 0.90 10*3/mm3    Eosinophils, Absolute 0.01 0.00 - 0.70 10*3/mm3    Basophils, Absolute 0.03 0.00 - 0.30 10*3/mm3    Immature Grans, Absolute 0.03 0.00 - 0.03 10*3/mm3   Protime-INR   Result Value Ref Range    Protime 20.2 (H) 11.0 - 15.4 Seconds    INR 1.70 (H) 0.90 - 1.10   aPTT   Result Value Ref Range    PTT 30.7 23.8 - 36.1 seconds   Scan Slide   Result Value Ref Range    Anisocytosis Slight/1+ None Seen    Hypochromia Mod/2+ None Seen    Microcytes Mod/2+ None Seen    Stomatocytes Slight/1+ None Seen    Platelet Morphology Normal Normal   Urinalysis With Microscopic If Indicated (No Culture) - Urine, Catheter   Result Value Ref Range    Color, UA Dark Yellow (A) Yellow, Straw    Appearance, UA Clear Clear    pH, UA 6.5 5.0 - 8.0    Specific Gravity, UA 1.024 1.005 - 1.030    Glucose, UA Negative Negative    Ketones, UA Trace (A) Negative    Bilirubin, UA Negative Negative    Blood, UA Negative Negative    Protein, UA Trace (A) Negative    Leuk Esterase, UA Negative Negative    Nitrite, UA Negative Negative    Urobilinogen, UA 0.2 E.U./dL 0.2 - 1.0 E.U./dL   Amylase   Result Value Ref Range    Amylase 39 28 - 100 U/L   Osmolality, Calculated   Result Value Ref Range    Osmolality Calc 265.7 (L) 273.0 - 305.0 mOsm/kg   Troponin   Result Value Ref Range    Troponin I 0.116 (H) <=0.040 ng/mL   Magnesium   Result Value Ref Range    Magnesium 1.1 (C) 1.7 - 2.6 mg/dL   TSH   Result Value Ref Range    TSH 1.586 0.550 - 4.780 mIU/mL   T4, Free    Result Value Ref Range    Free T4 1.32 0.89 - 1.76 ng/dL   POC Occult Blood Stool   Result Value Ref Range    Fecal Occult Blood Positive (A) Negative    Lot Number [id number]     Expiration Date 11/20     DEVELOPER LOT NUMBER [id number]     DEVELOPER EXPIRATION DATE 7/2,021     Positive Control Positive Positive    Negative Control Negative Negative   Type & Screen   Result Value Ref Range    ABO Type O     RH type Positive     Antibody Screen Positive     T&S Expiration Date 12/19/2018 11:59:59 PM    Prepare RBC, 2 Units   Result Value Ref Range    Product Code F3038Y20     Unit Number [id number]     UNIT  ABO O     UNIT  RH POS     Crossmatch Interpretation Compatible     Dispense Status XM     Blood Type OPOS     Blood Expiration Date 201901212359     Blood Type Barcode 5100     Product Code G6822M12     Unit Number [id number]     UNIT  ABO O     UNIT  RH POS     Crossmatch Interpretation Compatible     Dispense Status XM     Blood Type OPOS     Blood Expiration Date 487721474575     Blood Type Barcode 5100    Antibody Identification   Result Value Ref Range    Anti-E ANTI-E    Light Blue Top   Result Value Ref Range    Extra Tube hold for add-on    Green Top (Gel)   Result Value Ref Range    Extra Tube Hold for add-ons.    Lavender Top   Result Value Ref Range    Extra Tube hold for add-on    Gold Top - SST   Result Value Ref Range    Extra Tube Hold for add-ons.                 ED Course  ED Course as of Dec 16 2352   Sun Dec 16, 2018   1950 Patient's care is assumed from Dr. Quigley at shift change.  Please refer to his documentation above.  [CM]   2050 Patient is resting, in no apparent distress.  She complains of continued nausea, also epigastric abdominal pain.  The abdomen is soft and nondistended, bowel sounds are present and normoactive; there is mild to moderate epigastric tenderness, with no other tenderness, no acute peritoneal signs.  Rectal examination reveals large external hemorrhoids, no  signs of active bleeding.  Digital exam reveals soft, dark brown stool, with no evidence of gross blood.  Stool does test heme positive, though not markedly so.  [CM]   2309 Hospitalist paged.  [CM]   2320 Discussed with Dr. Antunez, he wanted me to consult surgery.  I discussed the case with Dr. Martines, who advises that immediate surgical intervention is likely not necessary, but she and her partners are certainly available.  She advises that she would recommend Dr. Nelson or Dr. Reyes if operative repair of this hiatal hernia is needed.  [CM]   2334 Patient is admitted to the telemetry unit under Dr. Antunez.  [CM]      ED Course User Index  [CM] Steven Callahan MD                  [facility]      Final diagnoses:   Intractable vomiting with nausea, unspecified vomiting type   Hypokalemia   Hypomagnesemia   Hiatal hernia           Please note that portions of this note were completed with a voice recognition program. Efforts were made to edit the dictations, but occasionally words are mistranscribed.       Steven Callahan MD  12/16/18 5827

## 2018-12-17 NOTE — PROGRESS NOTES
Discharge Planning Assessment   Rod[facility]     Patient Name: Mary Kay Goldstein  MRN: [id number]  Today's Date: 12/17/2018    Admit Date: 12/16/2018    Discharge Needs Assessment     Row Name 12/17/18 1129       Living Environment    Lives With  child(romeo), adult    Name(s) of Who Lives With Patient  Lives with son, Randall.     Current Living Arrangements  home/apartment/condo    Primary Care Provided by  child(romeo)    Provides Primary Care For  no one        Discharge Plan     Row Name 12/17/18 1129       Plan    Plan  Pt admitted on 12/16/18.  SS received consult per [facility] [place], [place] for discharge planning.  SS spoke with pt's son, Randall, on this date.  Pt lives at home with son, Randall, and plans to return home at discharge.  Pt currently does not utilize home health.  Pt currently utilizes [facility] via unknown provider.  Pt's PCP is Dr. Hewitt.  SS will follow and assist with discharge needs.           Audelia Lombardi

## 2018-12-17 NOTE — H&P
"Hospitalist History and Physical        Patient Identification  Name: Mary Kay Goldstein  Age/Sex: 63 y.o. female  :  1955        MRN: [id number]  Visit Number: [id number]  PCP: Shon Hewitt MD        Chief complaint nausea/vomiting, abdominal pain, diarrhea    History of Present Illness:  Patient is a 63 y.o. female who presents with complaints of epigastric abdominal pain, nausea/vomiting, with some associated diarrhea over the last 3 months. She claims her symptoms have worsened in severity over the last month. She was diagnosed with GI bleed in 2018 and transferred to [place], at which time EGD was obtained which showed gastric ulcers per her son; it also apparently showed a significant hiatal hernia. She was having similar GI symptoms then as well and was told that she probably needed a nissen fundoplication, but was instructed to lose weight before proceeding with surgery. Over the last 3 months, she has lost 50 lb primarily because she cannot keep any food down and even struggles with medication at times. She was seen by her PCP recently; she was given a liter of IV fluids and had some \"tests\" down, but has not received the results yet. Her son did think that one of the tests involved sending stool samples off for further study. Due to worsening GI symptoms and progressive generalized weakness, she decided to come to the ED for further evaluation.     Here, she was found to be significantly hypokalemia and hypomagnesemia. Liver enzymes were fairly unremarkable. Troponin was indeterminately elevated; patient denies chest pain or dyspnea and EKG showed only nonspecific changes. Patient's troponin was elevated to similar level when she had her GI bleed in 2018 as well. She denies any known history of coronary artery disease. Hemoglobin was 9.5 (11.0 in 2018), WBC normal with no left shift, and platelets are elevated. PTT is normal, but INR is elevated at 1.7; she does not take chronic " anticoagulation. Rectal exam revealed large external hemorrhoids but no signs of active bleeding or melena, only some soft brown stool. Stool did test mildly hemoccult positive, however. CT abdomen/pelvis showed some changes that could potentially suggest enteritis, as well as a moderate sliding scale hiatal hernia in the lower thorax containing stomach.     Review of Systems  Review of Systems   Constitutional: Positive for activity change, appetite change, fatigue and unexpected weight change. Negative for chills, diaphoresis and fever.   HENT: Positive for hearing loss. Negative for congestion, postnasal drip, rhinorrhea, sinus pressure, sinus pain and sore throat.    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 Negative for photophobia, pain, discharge, redness and itching.   Respiratory: Negative for cough, shortness of breath and wheezing.    Cardiovascular: Negative for chest pain, palpitations and leg swelling.   Gastrointestinal: Positive for abdominal pain, diarrhea, nausea and vomiting. Negative for abdominal distention and constipation.   Endocrine: Negative for cold intolerance, heat intolerance, polydipsia, polyphagia and polyuria.   Genitourinary: Negative for difficulty urinating, dysuria, flank pain and hematuria.   Musculoskeletal: Positive for arthralgias and back pain. Negative for joint swelling, myalgias, neck pain and neck stiffness.   Skin: Negative for color change, pallor, rash and wound.   Allergic/Immunologic: Negative for environmental allergies, food allergies and immunocompromised state.   Neurological: Positive for weakness (generalized). Negative for dizziness, tremors, seizures, syncope, light-headedness, numbness and headaches.   Hematological: Negative for adenopathy. Does not bruise/bleed easily.   Psychiatric/Behavioral: Negative for agitation, behavioral problems and confusion.       History  Past Medical History:   Diagnosis Date   • Arthritis     • Asthma    • Blind    • Hypertension    • Low back pain    • Seizures (CMS/HCC)    • Stomach ulcer      *  Hearing impairment left ear    Past Surgical History:   Procedure Laterality Date   • APPENDECTOMY     • BREAST SURGERY      reduction   • CHOLECYSTECTOMY     • COLONOSCOPY     • EYE SURGERY     • HYSTERECTOMY       Family History   Problem Relation Age of Onset   • Cancer Mother    • Diabetes Mother    • Hypertension Mother    • Hypertension Father    • Heart disease Father      Social History     Tobacco Use   • Smoking status: Never Smoker   • Smokeless tobacco: Never Used   Substance Use Topics   • Alcohol use: No   • Drug use: No     Medications Prior to Admission   Medication Sig Dispense Refill Last Dose   • albuterol (PROVENTIL HFA;VENTOLIN HFA) 108 (90 BASE) MCG/ACT inhaler Inhale 2 puffs Every 4 (Four) Hours As Needed for Wheezing or Shortness of Air.   12/16/2018 at am   • amLODIPine (NORVASC) 5 MG tablet Take 5 mg by mouth Daily.   12/16/2018 at am   • aspirin 81 MG EC tablet Take 81 mg by mouth Daily.   12/16/2018 at Unknown time   • cetirizine (zyrTEC) 10 MG tablet Take 10 mg by mouth Every Night.   12/15/2018 at pm   • FLUoxetine (PROzac) 20 MG capsule Take 20 mg by mouth Daily.   12/16/2018 at am   • gabapentin (NEURONTIN) 600 MG tablet Take 600 mg by mouth 3 (Three) Times a Day.   12/16/2018 at am   • lisinopril (PRINIVIL,ZESTRIL) 20 MG tablet Take 20 mg by mouth Daily.   12/16/2018 at am   • metFORMIN (GLUCOPHAGE) 1000 MG tablet Take 1,000 mg by mouth 2 (Two) Times a Day With Meals.   12/16/2018 at am   • montelukast (SINGULAIR) 10 MG tablet Take 10 mg by mouth every night.   12/15/2018 at pm   • OXcarbazepine (TRILEPTAL) 600 MG tablet Take 600 mg by mouth 2 (Two) Times a Day.   12/16/2018 at am   • oxyCODONE (ROXICODONE) 10 MG tablet Take 10 mg by mouth Every 6 (Six) Hours As Needed for Severe Pain .   12/16/2018 at am   • pantoprazole (PROTONIX) 40 MG EC tablet Take 40 mg by mouth 2 (Two)  Times a Day.   12/16/2018 at am     Allergies:  Zithromax [azithromycin]; Codeine; Penicillins; and Phenobarbital    Objective     Vital Signs  Temp:  [98 °F (36.7 °C)-98.5 °F (36.9 °C)] 98.5 °F (36.9 °C)  Heart Rate:  [63-70] 70  Resp:  [18] 18  BP: (135-176)/() 145/72  Body mass index is 32.16 kg/m².    Physical Exam:  Physical Exam   Constitutional: She is oriented to person, place, and time.   Elderly female, no acute distress   HENT:   Head: Normocephalic and atraumatic.   Oral mucosa dry   Eyes:   Eyes do not track (blind in both)   Neck: Normal range of motion. Neck supple. No JVD present.   Cardiovascular: Normal rate, regular rhythm, normal heart sounds and intact distal pulses.   No murmur heard.  Pulmonary/Chest: Effort normal and breath sounds normal. No respiratory distress. She has no wheezes. She exhibits no tenderness.   Abdominal: Soft. Bowel sounds are normal. She exhibits no distension. There is tenderness (in epigastric region, mild/moderate with minimal guarding).   Musculoskeletal: Normal range of motion.   Lymphadenopathy:     She has no cervical adenopathy.   Neurological: She is alert and oriented to person, place, and time.   No gross focal deficits appreciated   Skin: Skin is warm and dry. Capillary refill takes less than 2 seconds. No rash noted. No erythema. No pallor.   Psychiatric: She has a normal mood and affect. Her behavior is normal. Judgment and thought content normal.         Results Review:       Lab Results:  Results from last 7 days   Lab Units  12/16/18 2010   WBC 10*3/mm3  11.32   HEMOGLOBIN g/dL  9.5*   PLATELETS 10*3/mm3  504*         Results from last 7 days   Lab Units  12/16/18 2039   SODIUM mmol/L  132*   POTASSIUM mmol/L  2.3*   CHLORIDE mmol/L  89*   CO2 mmol/L  33.9*   BUN mg/dL  13   CREATININE mg/dL  0.63   CALCIUM mg/dL  8.4   GLUCOSE mg/dL  118*     Results from last 7 days   Lab Units  12/16/18 2039   MAGNESIUM mg/dL  1.1*     No results found for:  HGBA1C  Results from last 7 days   Lab Units  12/16/18 2039   BILIRUBIN mg/dL  0.2   ALK PHOS U/L  93   AST (SGOT) U/L  33*   ALT (SGPT) U/L  17     Results from last 7 days   Lab Units  12/16/18 2039 12/16/18 1937   TROPONIN I ng/mL  0.116*  0.069*         Results from last 7 days   Lab Units  12/16/18 1937   INR   1.70*           I have reviewed the patient's laboratory results.    Imaging:  Imaging Results (last 72 hours)     Procedure Component Value Units Date/Time    XR Chest 1 View [[id number]] Resulted:  12/16/18 2250     Updated:  12/16/18 2250    CT Abdomen Pelvis With Contrast [[id number]] Resulted:  12/16/18 2247     Updated:  12/16/18 2248      Chest XR: no overt infiltrate, pulmonary edema or pleural effusion. Abnormal right sided cardiac border, present on prior chest XR, at that time felt to be probable prominent right sided epicardial fat pad.    CT abdomen/pelvis:   Per virtual radiology, scattered fluid within the small bowel and in the ascending colon, may be mild mucosal thickening of the proximal ascending colon or secondary to under distention, uncomplicated enteritis is not excluded.  No other acute abnormalities are described in the report.  In the lower thorax a moderate sliding hiatal hernia is seen containing proximal stomach.           I have personally reviewed the patient's radiologic imaging.          EKG: NSR, HR 69, nonspecific ST and T wave abnormalities, but no overt ST changes to suggest acute ischemia appreciated. QTc prolonged at 497.     I have personally reviewed the patient's EKG.          Assessment/Plan     - Intractable nausea, vomiting, abdominal pain with some diarrhea x3 months, worsening over last month: there was suspicion per GI workup in April 2018 that these symptoms were related to sliding hiatal hernia. Consult general surgery for further assistance in workup and management. Keep NPO, hydrate with IV fluids. Will make antiemetics and analgesics available  PRN. RN to notify me if patient produces diarrhea (last bowel movement apparently yesterday morning); will consider sending stool for further study including for C dificile toxin if truly having copious amounts of liquid stool.   - Microcytic anemia with drop in hemoglobin from 11 to 9.5 since April, in setting of history of GI bleed in April and EGD at that time which apparently showed gastric ulcers. Stool is hemoccult positive today. Will place on IV protonix BID. Consulting surgery as noted above. Repeat labs in the morning. Check iron profile, B12, folate.  - Critical hypokalemia, hypomagnesemia: replace per protocol. No doubt poor PO intake and GI fluid losses are contributing to these findings.  - Indeterminate troponin elevation: no chest pain, no overt ST changes on EKG. Continue to trend cardiac enzymes. Obtain ECHO in the morning to evaluate further. Will go ahead and consult cardiology to determine whether any further workup is necessary.   - Possible underlying coagulopathy: INR is 1.70; not on any anticoagulation. Will repeat in the morning. May need to consider hematology consult if INR is truly elevated to this degree without anticoagulation on board. No known history of cirrhosis/liver dysfunction.   - Seizure disorder: continue home trileptal; check serum level (send out test) in the morning  - QT prolongation: avoid QT prolonging medication. Replace K+ and mag both of which are critically low and contributing to QT prolongation.     DVT/GI Prophylaxis: SCDs, IV Protonix BID    Estimated Length of Stay >2 midnights    I discussed the patient's findings, assessment and plan with the patient, her son, and her RN Shayla who was present during the entire interview and physical examination on [facility].    * Patient is high risk due to critical electrolyte disturbance, indeterminate troponin elevation    Martin Antunez MD  12/17/18  2:44 AM

## 2018-12-17 NOTE — PLAN OF CARE
Problem: Patient Care Overview  Goal: Plan of Care Review  Outcome: Ongoing (interventions implemented as appropriate)    Goal: Individualization and Mutuality  Outcome: Ongoing (interventions implemented as appropriate)    Goal: Discharge Needs Assessment  Outcome: Ongoing (interventions implemented as appropriate)    Goal: Interprofessional Rounds/Family Conf  Outcome: Ongoing (interventions implemented as appropriate)      Problem: Fall Risk (Adult)  Goal: Identify Related Risk Factors and Signs and Symptoms  Outcome: Ongoing (interventions implemented as appropriate)    Goal: Absence of Fall  Outcome: Ongoing (interventions implemented as appropriate)      Problem: Nausea/Vomiting (Adult)  Goal: Identify Related Risk Factors and Signs and Symptoms  Outcome: Ongoing (interventions implemented as appropriate)    Goal: Symptom Relief  Outcome: Ongoing (interventions implemented as appropriate)    Goal: Adequate Hydration  Outcome: Ongoing (interventions implemented as appropriate)      Problem: Skin Injury Risk (Adult)  Goal: Identify Related Risk Factors and Signs and Symptoms  Outcome: Ongoing (interventions implemented as appropriate)    Goal: Skin Health and Integrity  Outcome: Ongoing (interventions implemented as appropriate)

## 2018-12-17 NOTE — PLAN OF CARE
Problem: Fall Risk (Adult)  Goal: Identify Related Risk Factors and Signs and Symptoms  Outcome: Ongoing (interventions implemented as appropriate)      Problem: Nausea/Vomiting (Adult)  Goal: Identify Related Risk Factors and Signs and Symptoms  Outcome: Ongoing (interventions implemented as appropriate)      Problem: Skin Injury Risk (Adult)  Goal: Identify Related Risk Factors and Signs and Symptoms  Outcome: Ongoing (interventions implemented as appropriate)

## 2018-12-18 LAB
ANION GAP SERPL CALCULATED.3IONS-SCNC: 8.5 MMOL/L (ref 3.6–11.2)
BUN BLD-MCNC: <5 MG/DL (ref 7–21)
BUN/CREAT SERPL: ABNORMAL (ref 7–25)
CALCIUM SPEC-SCNC: 8.6 MG/DL (ref 7.7–10)
CHLORIDE SERPL-SCNC: 97 MMOL/L (ref 99–112)
CO2 SERPL-SCNC: 29.5 MMOL/L (ref 24.3–31.9)
CREAT BLD-MCNC: 0.6 MG/DL (ref 0.43–1.29)
GFR SERPL CREATININE-BSD FRML MDRD: 102 ML/MIN/1.73
GLUCOSE BLD-MCNC: 164 MG/DL (ref 70–110)
OSMOLALITY SERPL CALC.SUM OF ELEC: NORMAL MOSM/KG (ref 273–305)
PHOSPHATE SERPL-MCNC: 2.1 MG/DL (ref 2.7–4.5)
POTASSIUM BLD-SCNC: 3.2 MMOL/L (ref 3.5–5.3)
POTASSIUM BLD-SCNC: 4.3 MMOL/L (ref 3.5–5.3)
SODIUM BLD-SCNC: 135 MMOL/L (ref 135–153)

## 2018-12-18 PROCEDURE — 25810000003 SODIUM CHLORIDE 0.9 % WITH KCL 20 MEQ 20-0.9 MEQ/L-% SOLUTION: Performed by: INTERNAL MEDICINE

## 2018-12-18 PROCEDURE — 94799 UNLISTED PULMONARY SVC/PX: CPT

## 2018-12-18 PROCEDURE — 99233 SBSQ HOSP IP/OBS HIGH 50: CPT | Performed by: INTERNAL MEDICINE

## 2018-12-18 PROCEDURE — 80048 BASIC METABOLIC PNL TOTAL CA: CPT | Performed by: INTERNAL MEDICINE

## 2018-12-18 PROCEDURE — 25010000002 IRON SUCROSE PER 1 MG: Performed by: INTERNAL MEDICINE

## 2018-12-18 PROCEDURE — 84100 ASSAY OF PHOSPHORUS: CPT | Performed by: INTERNAL MEDICINE

## 2018-12-18 PROCEDURE — 84132 ASSAY OF SERUM POTASSIUM: CPT | Performed by: INTERNAL MEDICINE

## 2018-12-18 RX ORDER — POTASSIUM CHLORIDE 20 MEQ/1
40 TABLET, EXTENDED RELEASE ORAL EVERY 4 HOURS
Status: COMPLETED | OUTPATIENT
Start: 2018-12-18 | End: 2018-12-18

## 2018-12-18 RX ADMIN — POTASSIUM CHLORIDE 40 MEQ: 1500 TABLET, EXTENDED RELEASE ORAL at 01:18

## 2018-12-18 RX ADMIN — PANTOPRAZOLE SODIUM 40 MG: 40 INJECTION, POWDER, FOR SOLUTION INTRAVENOUS at 03:46

## 2018-12-18 RX ADMIN — ASPIRIN 81 MG: 81 TABLET ORAL at 08:34

## 2018-12-18 RX ADMIN — SUCRALFATE 1 G: 1 TABLET ORAL at 11:43

## 2018-12-18 RX ADMIN — FLUOXETINE 20 MG: 20 CAPSULE ORAL at 08:34

## 2018-12-18 RX ADMIN — SODIUM CHLORIDE AND POTASSIUM CHLORIDE 100 ML/HR: 9; 1.49 INJECTION, SOLUTION INTRAVENOUS at 08:31

## 2018-12-18 RX ADMIN — POTASSIUM CHLORIDE 40 MEQ: 1500 TABLET, EXTENDED RELEASE ORAL at 11:43

## 2018-12-18 RX ADMIN — GABAPENTIN 400 MG: 400 CAPSULE ORAL at 15:09

## 2018-12-18 RX ADMIN — POTASSIUM CHLORIDE 40 MEQ: 1500 TABLET, EXTENDED RELEASE ORAL at 03:46

## 2018-12-18 RX ADMIN — GABAPENTIN 400 MG: 400 CAPSULE ORAL at 03:46

## 2018-12-18 RX ADMIN — SUCRALFATE 1 G: 1 TABLET ORAL at 22:17

## 2018-12-18 RX ADMIN — OXYCODONE HYDROCHLORIDE 10 MG: 5 TABLET ORAL at 03:57

## 2018-12-18 RX ADMIN — ALBUTEROL SULFATE 2.5 MG: 2.5 SOLUTION RESPIRATORY (INHALATION) at 18:37

## 2018-12-18 RX ADMIN — OXYCODONE HYDROCHLORIDE 10 MG: 5 TABLET ORAL at 20:52

## 2018-12-18 RX ADMIN — MONTELUKAST SODIUM 10 MG: 10 TABLET, COATED ORAL at 20:55

## 2018-12-18 RX ADMIN — SUCRALFATE 1 G: 1 TABLET ORAL at 08:31

## 2018-12-18 RX ADMIN — SODIUM CHLORIDE, PRESERVATIVE FREE 3 ML: 5 INJECTION INTRAVENOUS at 08:34

## 2018-12-18 RX ADMIN — IPRATROPIUM BROMIDE AND ALBUTEROL SULFATE 3 ML: .5; 3 SOLUTION RESPIRATORY (INHALATION) at 06:41

## 2018-12-18 RX ADMIN — PANTOPRAZOLE SODIUM 40 MG: 40 INJECTION, POWDER, FOR SOLUTION INTRAVENOUS at 16:52

## 2018-12-18 RX ADMIN — OXCARBAZEPINE 600 MG: 300 TABLET, FILM COATED ORAL at 20:55

## 2018-12-18 RX ADMIN — OXYCODONE HYDROCHLORIDE 10 MG: 5 TABLET ORAL at 11:45

## 2018-12-18 RX ADMIN — POTASSIUM CHLORIDE 40 MEQ: 1500 TABLET, EXTENDED RELEASE ORAL at 15:09

## 2018-12-18 RX ADMIN — GABAPENTIN 400 MG: 400 CAPSULE ORAL at 22:17

## 2018-12-18 RX ADMIN — AMLODIPINE BESYLATE 5 MG: 5 TABLET ORAL at 08:34

## 2018-12-18 RX ADMIN — CETIRIZINE HYDROCHLORIDE 10 MG: 10 TABLET, FILM COATED ORAL at 20:55

## 2018-12-18 RX ADMIN — SUCRALFATE 1 G: 1 TABLET ORAL at 16:52

## 2018-12-18 RX ADMIN — IRON SUCROSE 200 MG: 20 INJECTION, SOLUTION INTRAVENOUS at 08:33

## 2018-12-18 RX ADMIN — OXCARBAZEPINE 600 MG: 300 TABLET, FILM COATED ORAL at 08:31

## 2018-12-18 NOTE — PROGRESS NOTES
[facility] HOSPITALIST PROGRESS NOTE     Patient Identification:  Name:  Mary Kay Goldstein  Age:  63 y.o.  Sex:  female  :  1955  MRN:  [id number]  Visit Number:  [id number]  ROOM: [address] [place] [address]     Primary Care Provider:  Shon Hewitt MD    Length of stay:  2    Subjective     Chief Complaint   Patient presents with   • Nausea     symptoms for one month   • Vomiting   • Diarrhea      - patient seen and examined reviewed her progression discussed the case with the family at the bedside, she feels herself better compared to yesterday, tolerating the liquid diet fine, no nausea or vomiting, only had 1 bowel movement that was unremarkable.  Her abdominal pain is less pronounced compared to yesterday.        Objective     Current Hospital Meds:  amLODIPine 5 mg Oral Daily   aspirin 81 mg Oral Daily   cetirizine 10 mg Oral Nightly   FLUoxetine 20 mg Oral Daily   gabapentin 400 mg Oral Q8H   iron sucrose (VENOFER) IVPB 200 mg Intravenous Q24H   montelukast 10 mg Oral Nightly   OXcarbazepine 600 mg Oral Q12H   pantoprazole 40 mg Intravenous BID AC   potassium chloride 40 mEq Oral Q4H   sodium chloride 3 mL Intravenous Q12H   sucralfate 1 g Oral 4x Daily     sodium chloride 0.9 % with KCl 20 mEq 20 mL/hr Last Rate: 100 mL/hr (18 0831)     ----------------------------------------------------------------------------------------------------------------------  Vital Signs:  Temp:  [97.7 °F (36.5 °C)-98.9 °F (37.2 °C)] 97.8 °F (36.6 °C)  Heart Rate:  [66-78] 66  Resp:  [16-18] 18  BP: (115-156)/(59-75) 115/74  SpO2:  [92 %-96 %] 94 %  on   ;   Device (Oxygen Therapy): room air  Body mass index is 32.14 kg/m².    Wt Readings from Last 3 Encounters:   18 102 kg (224 lb)   18 109 kg (240 lb)   16 97.1 kg (214 lb)       Intake/Output Summary (Last 24 hours) at 2018 1227  Last data filed at 2018 0833  Gross per 24 hour   Intake 1805 ml   Output 900 ml   Net 905 ml      Diet Regular  ----------------------------------------------------------------------------------------------------------------------  Physical exam:  Constitutional:  Well-developed and well-nourished.  No respiratory distress.    Legally blind, hard of hearing  HENT:  Head:  Normocephalic and atraumatic.  Mouth:  Moist mucous membranes.  Better compared to yesterday, pale conjunctiva was noted  Eyes:  Conjunctivae and EOM are normal.  Pupils are equal, round, and reactive to light.  No scleral icterus.    Neck:  Neck supple.  No JVD present.    Cardiovascular:  Normal rate, regular rhythm and normal heart sounds with no murmur.  Pulmonary/Chest:  No respiratory distress, no wheezes, no crackles, with normal breath sounds and good air movement.  Abdominal:  Soft.  Bowel sounds are normal.  No distension , mild tenderness in the epigastrium and left upper quadrant area similar to yesterday, no rebound.   Musculoskeletal:  No edema, no tenderness, and no deformity.  No red or swollen joints anywhere.    Neurological:  No new lateralization  Skin:  Skin is warm and dry. No rash noted. No pallor.   Peripheral vascular:  Strong pulses in all 4 extremities with no clubbing, no cyanosis, no edema.      ----------------------------------------------------------------------------------------------------------------------Results from last 7 days   Lab Units  12/17/18   0241  12/16/18 2010 12/16/18   1937   WBC 10*3/mm3  12.22  11.32   --    HEMOGLOBIN g/dL  8.8*  9.5*   --    HEMATOCRIT %  30.5*  32.8*   --    MCV fL  69.8*  69.9*   --    MCHC g/dL  28.9*  29.0*   --    PLATELETS 10*3/mm3  613*  504*   --    INR   1.23*   --   1.70*     Results from last 7 days   Lab Units  12/18/18   0824  12/17/18   1710  12/17/18   0826  12/17/18   0546  12/16/18   2039   SODIUM mmol/L  135   --    --   131*  132*   POTASSIUM mmol/L  3.2*  2.9*  2.3*  2.8*  2.3*   MAGNESIUM mg/dL   --    --    --   2.8*  1.1*   CHLORIDE mmol/L  97*    --    --   89*  89*   CO2 mmol/L  29.5   --    --   31.8  33.9*   BUN mg/dL  <5*   --    --   9  13   CREATININE mg/dL  0.60   --    --   0.62  0.63   PHOSPHORUS mg/dL  2.1*   --    --    --    --    EGFR IF NONAFRICN AM mL/min/1.73  102   --    --   98  96   CALCIUM mg/dL  8.6   --    --   8.6  8.4   GLUCOSE mg/dL  164*   --    --   181*  118*   ALBUMIN g/dL   --    --    --   3.90  3.60   BILIRUBIN mg/dL   --    --    --   0.2  0.2   ALK PHOS U/L   --    --    --   100  93   AST (SGOT) U/L   --    --    --   36*  33*   ALT (SGPT) U/L   --    --    --   21  17   Estimated Creatinine Clearance: 127.3 mL/min (by C-G formula based on SCr of 0.6 mg/dL).  Results from last 7 days   Lab Units  12/17/18   0826  12/17/18   0241  12/16/18 2039   CK TOTAL U/L   --   83   --    CKMB ng/mL   --   9.93*   --    CK MB INDEX %   --   12.0*   --    TROPONIN I ng/mL  0.101*  0.141*  0.116*   MYOGLOBIN ng/mL   --   66.0   --      Hemoglobin A1C   Date/Time Value Ref Range Status   12/17/2018 0241 7.40 (H) 4.50 - 5.70 % Final     No results found for: AMMONIA    Results from last 7 days   Lab Units  12/16/18 2052   NITRITE UA   Negative             I have personally looked at the labs and they are summarized above.  ----------------------------------------------------------------------------------------------------------------------  Imaging Results (last 24 hours)     ** No results found for the last 24 hours. **        I have personally reviewed the radiology images and read the final radiology report.          Assessment & Plan      -History of upper GI bleed from peptic ulcer disease   -Chronic iron deficiency anemia, ferrous saturation is 5%   -Chronic upper GI symptoms, likely from severe hiatal hernia   -Reported history of diarrhea, none observed in house   -Large hiatal hernia, for surgical evaluation   -History of seizure disorder on oxcarbazepine and gabapentin   -Severe hypokalemia, due to GI loss, calculated  potassium deficit is 1100 mEq that needs to be placed slowly over the next 3 days, improved, hypomagnesemia that was replaced.     --cont 3 doses of IV Venofer 200 mg a day   --PPI to be IV twice a day   --fu on stool studies if any diarrhea  --advance to regualr diet, small bited   --fu on the Tegretol free and total levels  --cont lower gabapentin dose  --See orders for electrolytes replacement   --PT.OT  --CBC / chem8 in am  --likely top need elective surgical correction         Edwar Wilkins MD  12/18/18  12:27 PM

## 2018-12-19 VITALS
TEMPERATURE: 98.3 F | HEART RATE: 78 BPM | WEIGHT: 232.1 LBS | OXYGEN SATURATION: 97 % | BODY MASS INDEX: 33.23 KG/M2 | SYSTOLIC BLOOD PRESSURE: 116 MMHG | HEIGHT: 70 IN | DIASTOLIC BLOOD PRESSURE: 61 MMHG | RESPIRATION RATE: 18 BRPM

## 2018-12-19 LAB
ANION GAP SERPL CALCULATED.3IONS-SCNC: 6.7 MMOL/L (ref 3.6–11.2)
BUN BLD-MCNC: <5 MG/DL (ref 7–21)
BUN/CREAT SERPL: ABNORMAL (ref 7–25)
CALCIUM SPEC-SCNC: 8.7 MG/DL (ref 7.7–10)
CHLORIDE SERPL-SCNC: 97 MMOL/L (ref 99–112)
CO2 SERPL-SCNC: 30.3 MMOL/L (ref 24.3–31.9)
CREAT BLD-MCNC: 0.63 MG/DL (ref 0.43–1.29)
DEPRECATED RDW RBC AUTO: 43.8 FL (ref 37–54)
ERYTHROCYTE [DISTWIDTH] IN BLOOD BY AUTOMATED COUNT: 17.4 % (ref 11.5–14.5)
GFR SERPL CREATININE-BSD FRML MDRD: 96 ML/MIN/1.73
GLUCOSE BLD-MCNC: 163 MG/DL (ref 70–110)
HCT VFR BLD AUTO: 27.1 % (ref 37–47)
HGB BLD-MCNC: 7.5 G/DL (ref 12–16)
HGB BLD-MCNC: 8.5 G/DL (ref 12–16)
MCH RBC QN AUTO: 19.8 PG (ref 27–33)
MCHC RBC AUTO-ENTMCNC: 27.7 G/DL (ref 33–37)
MCV RBC AUTO: 71.7 FL (ref 80–94)
OSMOLALITY SERPL CALC.SUM OF ELEC: NORMAL MOSM/KG (ref 273–305)
PLATELET # BLD AUTO: 482 10*3/MM3 (ref 130–400)
PMV BLD AUTO: 9.1 FL (ref 6–10)
POTASSIUM BLD-SCNC: 4.1 MMOL/L (ref 3.5–5.3)
RBC # BLD AUTO: 3.78 10*6/MM3 (ref 4.2–5.4)
SODIUM BLD-SCNC: 134 MMOL/L (ref 135–153)
WBC NRBC COR # BLD: 6.46 10*3/MM3 (ref 4.5–12.5)

## 2018-12-19 PROCEDURE — G8988 SELF CARE GOAL STATUS: HCPCS

## 2018-12-19 PROCEDURE — G8978 MOBILITY CURRENT STATUS: HCPCS

## 2018-12-19 PROCEDURE — 97162 PT EVAL MOD COMPLEX 30 MIN: CPT

## 2018-12-19 PROCEDURE — 97167 OT EVAL HIGH COMPLEX 60 MIN: CPT

## 2018-12-19 PROCEDURE — 99239 HOSP IP/OBS DSCHRG MGMT >30: CPT | Performed by: INTERNAL MEDICINE

## 2018-12-19 PROCEDURE — 85027 COMPLETE CBC AUTOMATED: CPT | Performed by: INTERNAL MEDICINE

## 2018-12-19 PROCEDURE — G8979 MOBILITY GOAL STATUS: HCPCS

## 2018-12-19 PROCEDURE — 94799 UNLISTED PULMONARY SVC/PX: CPT

## 2018-12-19 PROCEDURE — 25010000002 IRON SUCROSE PER 1 MG: Performed by: INTERNAL MEDICINE

## 2018-12-19 PROCEDURE — 80048 BASIC METABOLIC PNL TOTAL CA: CPT | Performed by: INTERNAL MEDICINE

## 2018-12-19 PROCEDURE — G8980 MOBILITY D/C STATUS: HCPCS

## 2018-12-19 PROCEDURE — 97116 GAIT TRAINING THERAPY: CPT

## 2018-12-19 PROCEDURE — 97530 THERAPEUTIC ACTIVITIES: CPT

## 2018-12-19 PROCEDURE — 85018 HEMOGLOBIN: CPT | Performed by: INTERNAL MEDICINE

## 2018-12-19 PROCEDURE — G8987 SELF CARE CURRENT STATUS: HCPCS

## 2018-12-19 RX ORDER — GABAPENTIN 400 MG/1
400 CAPSULE ORAL EVERY 8 HOURS SCHEDULED
Qty: 90 CAPSULE | Refills: 0 | Status: SHIPPED | OUTPATIENT
Start: 2018-12-19 | End: 2019-01-18

## 2018-12-19 RX ORDER — ONDANSETRON 4 MG/1
4 TABLET, ORALLY DISINTEGRATING ORAL EVERY 8 HOURS PRN
Qty: 20 TABLET | Refills: 0 | Status: SHIPPED | OUTPATIENT
Start: 2018-12-19 | End: 2019-04-22

## 2018-12-19 RX ADMIN — IRON SUCROSE 200 MG: 20 INJECTION, SOLUTION INTRAVENOUS at 09:01

## 2018-12-19 RX ADMIN — OXCARBAZEPINE 600 MG: 300 TABLET, FILM COATED ORAL at 08:59

## 2018-12-19 RX ADMIN — TRAZODONE HYDROCHLORIDE 50 MG: 50 TABLET ORAL at 00:19

## 2018-12-19 RX ADMIN — PANTOPRAZOLE SODIUM 40 MG: 40 INJECTION, POWDER, FOR SOLUTION INTRAVENOUS at 08:59

## 2018-12-19 RX ADMIN — SUCRALFATE 1 G: 1 TABLET ORAL at 08:59

## 2018-12-19 RX ADMIN — AMLODIPINE BESYLATE 5 MG: 5 TABLET ORAL at 08:59

## 2018-12-19 RX ADMIN — SUCRALFATE 1 G: 1 TABLET ORAL at 11:54

## 2018-12-19 RX ADMIN — GABAPENTIN 400 MG: 400 CAPSULE ORAL at 05:14

## 2018-12-19 RX ADMIN — FLUOXETINE 20 MG: 20 CAPSULE ORAL at 08:59

## 2018-12-19 RX ADMIN — SODIUM CHLORIDE, PRESERVATIVE FREE 3 ML: 5 INJECTION INTRAVENOUS at 09:00

## 2018-12-19 RX ADMIN — OXYCODONE HYDROCHLORIDE 10 MG: 5 TABLET ORAL at 10:08

## 2018-12-19 NOTE — THERAPY DISCHARGE NOTE
Acute Care - Physical Therapy Initial Eval/Discharge   Rodri     Patient Name: Mary Kay Goldstein  : 1955  MRN: [id number]  Today's Date: 2018   Onset of Illness/Injury or Date of Surgery: 18  Date of Referral to PT: 18  Referring Physician: Dr. Wilkins      Admit Date: 2018    Visit Dx:    ICD-10-CM ICD-9-CM   1. Intractable vomiting with nausea, unspecified vomiting type R11.2 536.2   2. Hypokalemia E87.6 276.8   3. Hypomagnesemia E83.42 275.2   4. Hiatal hernia K44.9 553.3     Patient Active Problem List   Diagnosis   • Epilepsy (CMS/HCC)   • Guillain Barré syndrome (CMS/HCC)   • Intractable vomiting with nausea     Past Medical History:   Diagnosis Date   • Arthritis    • Asthma    • Blind    • Hypertension    • Low back pain    • Seizures (CMS/HCC)    • Stomach ulcer      Past Surgical History:   Procedure Laterality Date   • APPENDECTOMY     • BREAST SURGERY      reduction   • CHOLECYSTECTOMY     • COLONOSCOPY     • EYE SURGERY     • HYSTERECTOMY            PT ASSESSMENT (last 12 hours)      Physical Therapy Evaluation     Row Name 18 1500          PT Evaluation Time/Intention    Subjective Information  no complaints  -BC     Document Type  evaluation  -BC     Mode of Treatment  physical therapy;individual therapy  -BC     Patient Effort  good  -BC     Row Name 18 1500          General Information    Patient Profile Reviewed?  yes  -BC     Onset of Illness/Injury or Date of Surgery  18  -BC     Referring Physician  Dr. Wilkins  -BC     Patient Observations  alert;cooperative;agree to therapy  -BC     General Observations of Patient  supine in bed, no visitors in room.  -BC     Prior Level of Function  min assist:;gait  -BC     Equipment Currently Used at Home  bipap/ cpap;rollator  -BC     Risks Reviewed  patient:;LOB;nausea/vomiting;dizziness;increased discomfort;change in vital signs;increased drainage;lines disloged  -BC     Benefits Reviewed  patient:;improve  function;increase independence;increase strength;increase balance;decrease pain;decrease risk of DVT;improve skin integrity;increase knowledge  -BC     Row Name 12/19/18 1500          Relationship/Environment    Primary Source of Support/Comfort  child(romeo);sibling(s)  -BC     Name of Support/Comfort Primary Source  son and sister  -BC     Lives With  child(romeo), adult;sibling(s)  -BC     Row Name 12/19/18 1500          Resource/Environmental Concerns    Current Living Arrangements  home/apartment/condo  -BC     Resource/Environmental Concerns  none  -BC     Row Name 12/19/18 1500          Home Main Entrance    Number of Stairs, Main Entrance  two  -BC     Stair Railings, Main Entrance  railings safe and in good condition  -BC     Row Name 12/19/18 1500          Cognitive Assessment/Intervention- PT/OT    Orientation Status (Cognition)  oriented x 3  -BC     Follows Commands (Cognition)  WFL  -BC     Row Name 12/19/18 1500          Bed Mobility Assessment/Treatment    Bed Mobility Assessment/Treatment  bed mobility (all) activities  -BC     [place] Level (Bed Mobility)  minimum assist (75% patient effort)  -BC     Assistive Device (Bed Mobility)  bed rails  -BC     Row Name 12/19/18 1500          Transfer Assessment/Treatment    Transfer Assessment/Treatment  sit-stand transfer;stand-sit transfer  -BC     Maintains Weight-bearing Status (Transfers)  able to maintain  -BC     Sit-Stand [place] (Transfers)  contact guard  -BC     Stand-Sit [place] (Transfers)  contact guard  -BC     Row Name 12/19/18 1500          Sit-Stand Transfer    Assistive Device (Sit-Stand Transfers)  walker, front-wheeled  -BC     Row Name 12/19/18 1500          Stand-Sit Transfer    Assistive Device (Stand-Sit Transfers)  walker, front-wheeled  -BC     Row Name 12/19/18 1500          Gait/Stairs Assessment/Training    Gait/Stairs Assessment/Training  gait/ambulation independence  -BC     [place] Level (Gait)  minimum  assist (75% patient effort)  -BC     Assistive Device (Gait)  walker, front-wheeled  -BC     Distance in Feet (Gait)  60  -BC     Row Name 12/19/18 1500          General ROM    GENERAL ROM COMMENTS  BLE ROM WFL  -BC     Row Name 12/19/18 1500          MMT (Manual Muscle Testing)    General MMT Comments  BLE MMT 3+/5  -BC     Row Name 12/19/18 1500          Physical Therapy Clinical Impression    Date of Referral to PT  12/18/18  -BC     Functional Level at Time of Evaluation (PT Clinical Impression)  good  -BC     Row Name 12/19/18 1500          Positioning and Restraints    Pre-Treatment Position  in bed  -BC     Post Treatment Position  bed  -BC     In Bed  notified nsg;supine;call light within reach;encouraged to call for assist;side rails up x3  -BC     Row Name 12/19/18 1500          Living Environment    Home Accessibility  stairs to enter home  -BC       User Key  (r) = Recorded By, (t) = Taken By, (c) = Cosigned By    Initials Name Provider Type    Tracy Garcia, PT Physical Therapist              PT Recommendation and Plan          Outcome Measures     Row Name 12/19/18 1500 12/19/18 0939          How much help from another is currently needed...    Putting on and taking off regular lower body clothing?  3  -BC  3  -KH     Bathing (including washing, rinsing, and drying)  3  -BC  3  -KH     Toileting (which includes using toilet bed pan or urinal)  3  -BC  3  -KH     Putting on and taking off regular upper body clothing  3  -BC  3  -KH     Taking care of personal grooming (such as brushing teeth)  3  -BC  3  -KH     Eating meals  3  -BC  3  -KH     Score  18  -BC  18  -KH        Functional Assessment    Outcome Measure Options  --  AM-PAC 6 Clicks Daily Activity (OT)  -       User Key  (r) = Recorded By, (t) = Taken By, (c) = Cosigned By    Initials Name Provider Type    Tracy Garcia, PT Physical Therapist    Tea Wyatt, OT Occupational Therapist           Time  Calculation:   PT Charges     Row Name 12/19/18 1530             Time Calculation    PT Received On  12/19/18  -BC         Time Calculation- PT    Total Timed Code Minutes- PT  60 minute(s)  -BC         Timed Charges    77339 - Gait Training Minutes   15  -BC      62948 - PT Therapeutic Activity Minutes  15  -BC        User Key  (r) = Recorded By, (t) = Taken By, (c) = Cosigned By    Initials Name Provider Type    BC Tracy Richardson, PT Physical Therapist        Therapy Suggested Charges     Code   Minutes Charges    70313 (CPT®) Hc Pt Neuromusc Re Education Ea 15 Min      90513 (CPT®) Hc Pt Ther Proc Ea 15 Min      51366 (CPT®) Hc Gait Training Ea 15 Min 15 1    16324 (CPT®) Hc Pt Therapeutic Act Ea 15 Min 15 1    51999 (CPT®) Hc Pt Manual Therapy Ea 15 Min      71324 (CPT®) Hc Pt Iontophoresis Ea 15 Min      88522 (CPT®) Hc Pt Elec Stim Ea-Per 15 Min      19026 (CPT®) Hc Pt Ultrasound Ea 15 Min      05496 (CPT®) Hc Pt Self Care/Mgmt/Train Ea 15 Min      88387 (CPT®) Hc Pt Prosthetic (S) Train Initial Encounter, Each 15 Min      52765 (CPT®) Hc Pt Orthotic(S)/Prosthetic(S) Encounter, Each 15 Min      22752 (CPT®) Hc Orthotic(S) Mgmt/Train Initial Encounter, Each 15min      Total  30 2        Therapy Charges for Today     Code Description Service Date Service Provider Modifiers Qty    89059505179 HC PT MOBILITY CURRENT 12/19/2018 Tracy Richardson, PT GP, CK 1    36953454922 HC PT MOBILITY PROJECTED 12/19/2018 Tracy Richardson, PT GP, CK 1    38331954250 HC PT MOBILITY DISCHARGE 12/19/2018 Tracy Richardson, PT GP, CK 1    48392390641 HC GAIT TRAINING EA 15 MIN 12/19/2018 Tracy Richardson, PT GP 1    53911336537 HC PT THERAPEUTIC ACT EA 15 MIN 12/19/2018 Tracy Richardson, PT GP 1    58718064233 HC PT EVAL MOD COMPLEXITY 2 12/19/2018 Tracy Richardson, PT GP 1    29552914272 HC PT THER SUPP EA 15 MIN 12/19/2018 Tracy Richardson, PT GP 2          PT G-Codes  Outcome Measure Options: AM-PAC 6 Clicks Basic  Mobility (PT)  Score: 18  Functional Limitation: Mobility: Walking and moving around  Mobility: Walking and Moving Around Current Status (): At least 40 percent but less than 60 percent impaired, limited or restricted  Mobility: Walking and Moving Around Goal Status (): At least 40 percent but less than 60 percent impaired, limited or restricted  Mobility: Walking and Moving Around Discharge Status (): At least 40 percent but less than 60 percent impaired, limited or restricted         Tracy Richardson, PT  12/19/2018

## 2018-12-19 NOTE — DISCHARGE SUMMARY
[facility] HOSPITALISTS DISCHARGE SUMMARY    Patient Identification:  Name:  Mary Kay Goldstein  Age:  63 y.o.  Sex:  female  :  1955  MRN:  [id number]  Visit Number:  [id number]    Date of Admission: 2018  Date of Discharge:  2018     PCP: Shon Hewitt MD    DISCHARGE DIAGNOSIS  -Chronic nausea and vomiting and diarrhea, medication induced; metformin and high gabapentin, resolved  -History of upper GI bleed from peptic ulcer disease   -Chronic iron deficiency anemia, ferrous saturation is 5%   -Chronic epigastric abdominal pain, likely from severe hiatal hernia   -Large hiatal hernia, for surgical evaluation in the outpatient setting  -History of seizure disorder on oxcarbazepine and gabapentin   -Severe hypokalemia, due to GI loss, with hypomagnesemia, replaced       HOSPITAL COURSE  Patient is a 63 y.o. female presented to [facility] complaining of persistent nausea vomiting and diarrhea.  Please see the admitting history and physical for further details.  The patient was evaluated carefully with her medications and its believed that the patient is experiencing side effects from combination of metformin and high-dose of gabapentin, reviewed these medicines with the patient and her son at the bedside in extensive details.  4 chronic large hiatal hernia unfortunately is a course for chronic abdominal pain and it seems that taking oxycodone that she is doing at home does not affect her upper GI symptoms.  Additionally the patient has been adjusted on her medications and gradually was started on the diet in house starting from clears and then up with her regular diet.  The patient is tolerating the diet very fine and she is almost symptoms 40 however with palpation on the left upper quadrant and epigastrium she does have a tenderness without rebound from the large hiatal hernia.  The patient will be requested to follow-up with the surgical service in the outpatient  "setting within the next week or so, patient and son prefers to spend [place] before intervening with her hiatal hernia.  Patient seen and examined today she is feeling herself \"perfect\".  Hemoglobin was checked as noted to have a drop in the earlier lab however repeated 1 seems to be the same as her previous baseline.  Therefore she will be continued on aspirin along with a PPI and sucralfate.  She did great with physical therapy.        VITAL SIGNS:  Temp:  [98 °F (36.7 °C)-98.4 °F (36.9 °C)] 98.3 °F (36.8 °C)  Heart Rate:  [] 78  Resp:  [16-20] 18  BP: (116-175)/(52-85) 116/61  SpO2:  [91 %-97 %] 97 %  on   ;   Device (Oxygen Therapy): room air    Body mass index is 33.3 kg/m².  Wt Readings from Last 3 Encounters:   12/19/18 105 kg (232 lb 1.6 oz)   04/04/18 109 kg (240 lb)   08/24/16 97.1 kg (214 lb)       PHYSICAL EXAM:  Constitutional:  Well-developed and well-nourished.  No respiratory distress.    Legally blind, hard of hearing  HENT:  Head:  Normocephalic and atraumatic.  Mouth:  Moist mucous membranes.  Better compared to yesterday, pale conjunctiva was noted  Eyes:  Conjunctivae and EOM are normal.  Pupils are equal, round, and reactive to light.  No scleral icterus.    Neck:  Neck supple.  No JVD present.    Cardiovascular:  Normal rate, regular rhythm and normal heart sounds with no murmur.  Pulmonary/Chest:  No respiratory distress, no wheezes, no crackles, with normal breath sounds and good air movement.  Abdominal:  Soft.  Bowel sounds are normal.  No distension , mild tenderness in the epigastrium and left upper quadrant area similar to yesterday, no rebound.   Musculoskeletal:  No edema, no tenderness, and no deformity.  No red or swollen joints anywhere.    Neurological:  No new lateralization  Skin:  Skin is warm and dry. No rash noted. No pallor.   Peripheral vascular:  Strong pulses in all 4 extremities with no clubbing, no cyanosis, no edema.           DISCHARGE DISPOSITION "   Stable    DISCHARGE MEDICATIONS:     Discharge Medications      New Medications      Instructions Start Date   gabapentin 400 MG capsule  Commonly known as:  NEURONTIN  Replaces:  gabapentin 600 MG tablet   400 mg, Oral, Every 8 Hours Scheduled      ondansetron ODT 4 MG disintegrating tablet  Commonly known as:  ZOFRAN ODT   4 mg, Oral, Every 8 Hours PRN      SITagliptin 50 MG tablet  Commonly known as:  JANUVIA   50 mg, Oral, Daily         Continue These Medications      Instructions Start Date   albuterol sulfate  (90 Base) MCG/ACT inhaler  Commonly known as:  PROVENTIL HFA;VENTOLIN HFA;PROAIR HFA   2 puffs, Inhalation, Every 4 Hours PRN      amLODIPine 5 MG tablet  Commonly known as:  NORVASC   5 mg, Oral, Daily      aspirin 81 MG EC tablet   81 mg, Oral, Daily      cetirizine 10 MG tablet  Commonly known as:  zyrTEC   10 mg, Oral, Nightly      FLUoxetine 20 MG capsule  Commonly known as:  PROzac   20 mg, Oral, Daily      montelukast 10 MG tablet  Commonly known as:  SINGULAIR   10 mg, Oral, Nightly      OXcarbazepine 600 MG tablet  Commonly known as:  TRILEPTAL   600 mg, Oral, 2 Times Daily      oxyCODONE 10 MG tablet  Commonly known as:  ROXICODONE   10 mg, Oral, Every 6 Hours PRN      pantoprazole 40 MG EC tablet  Commonly known as:  PROTONIX   40 mg, Oral, 2 Times Daily      sucralfate 1 g tablet  Commonly known as:  CARAFATE   1 g, Oral, 4 Times Daily      traZODone 50 MG tablet  Commonly known as:  DESYREL   50 mg, Oral, Nightly         Stop These Medications    gabapentin 600 MG tablet  Commonly known as:  NEURONTIN  Replaced by:  gabapentin 400 MG capsule     lisinopril 20 MG tablet  Commonly known as:  PRINJONOILZESTRIL     metFORMIN 1000 MG tablet  Commonly known as:  GLUCOPHAGE              Future Appointments   Date Time Provider Department Center   1/15/2019  1:20 PM Violette Julio PA-C [facility] None     Your Scheduled Appointments    Sidney 15, 2019  1:20 PM EST  Follow Up with Violette  GAUTAM Julio  [facility] (--) [address] [id number] [phone]   Arrive 15 minutes prior to appointment.        Additional Instructions for the Follow-ups that You Need to Schedule     Referral to Home Health   As directed      Face to Face Visit Date:  12/19/2018    Follow-up Provider for Plan of Care?:  I treated the patient in an acute care facility and will not continue treatment after discharge.    Follow-up Provider:  SIVA PRICE [[id number]]    Reason/Clinical Findings:  PErsistent N/V/D - BS management    Describe mobility limitations that make leaving home difficult:  blind, semi-deaf and needs special transportation    Nursing/Therapeutic Services Requested:  Skilled Nursing    Skilled nursing orders:  Medication education Cardiopulmonary assessments    Frequency:  1 Week 1           Follow-up Information     [facility] [place] .    Specialty:  Home Health Services  Contact information:  [address] [address]           Siva Price MD .    Specialty:  Family Medicine  Contact information:  [address] [place] [address] [phone]                    TEST  RESULTS PENDING AT DISCHARGE   Order Current Status    Carbamazepine Level, Free & Total In process    Oxcarbazepine Level In process           CODE STATUS  Code Status and Medical Interventions:   Ordered at: 12/16/18 1765     Level Of Support Discussed With:    Patient     Code Status:    CPR     Medical Interventions (Level of Support Prior to Arrest):    Full       Mhd Torie Wilkins MD  12/19/18  12:44 PM    Please note that this discharge summary required more than 30 minutes to complete.    Please send a copy of this dictation to the following providers:  Siva Price MD

## 2018-12-19 NOTE — PROGRESS NOTES
Discharge Planning Assessment  BETO Mace     Patient Name: Mary Kay Goldstein  MRN: [id number]  Today's Date: 12/19/2018    Admit Date: 12/16/2018      Discharge Plan     Row Name 12/19/18 1402       Plan    Final Discharge Disposition Code  06 - home with home health care    Final Note  Pt to be discharged home on this date with Physician ordered home health .  SS arranged with [facility] per Sahra at [phone] and faxed pt information to [phone].  RN to call report.  Pt to be transported via private auto per son.        Audelia Lombardi

## 2018-12-19 NOTE — THERAPY EVALUATION
Acute Care - Occupational Therapy Initial Evaluation   [facility]     Patient Name: Mary Kay Goldstein  : 1955  MRN: [id number]  Today's Date: 2018  Onset of Illness/Injury or Date of Surgery: 18(Admit date)     Referring Physician: Dr. Wilkins    Admit Date: 2018       ICD-10-CM ICD-9-CM   1. Intractable vomiting with nausea, unspecified vomiting type R11.2 536.2   2. Hypokalemia E87.6 276.8   3. Hypomagnesemia E83.42 275.2   4. Hiatal hernia K44.9 553.3     Patient Active Problem List   Diagnosis   • Epilepsy (CMS/HCC)   • Guillain Barré syndrome (CMS/HCC)   • Intractable vomiting with nausea     Past Medical History:   Diagnosis Date   • Arthritis    • Asthma    • Blind    • Hypertension    • Low back pain    • Seizures (CMS/HCC)    • Stomach ulcer      Past Surgical History:   Procedure Laterality Date   • APPENDECTOMY     • BREAST SURGERY      reduction   • CHOLECYSTECTOMY     • COLONOSCOPY     • EYE SURGERY     • HYSTERECTOMY            OT ASSESSMENT FLOWSHEET (last 72 hours)      Occupational Therapy Evaluation     Row Name 18 0928                   OT Evaluation Time/Intention    Subjective Information  complains of;pain Nursing aware and treating as necessary  -        Document Type  evaluation  -        Mode of Treatment  occupational therapy;individual therapy  -        Patient Effort  good  -        Comment  Pt and nursing agreeable to OT eval   -           General Information    Patient Profile Reviewed?  yes  -        Onset of Illness/Injury or Date of Surgery  18 Admit date  -        Referring Physician  Dr. Wilkins  -        Patient Observations  alert;agree to therapy;cooperative  -        Patient/Family Observations  Family in room and supportive  -        General Observations of Patient  Supine and agreeable to OT eval  -        Prior Level of Function  min assist:;ADL's  -        Equipment Currently Used at Home  shower  chair;rollator;wheelchair;hospital bed  -        Equipment Ordered for Patient  -- TBD  -        Risks Reviewed  patient and family:;LOB;nausea/vomiting;dizziness;increased discomfort;change in vital signs;increased drainage;lines disloged  -        Benefits Reviewed  patient and family:;improve function;increase independence;increase strength;increase balance;decrease pain;decrease risk of DVT;improve skin integrity;increase knowledge  -        Barriers to Rehab  visual deficit Pt is blind  -           Relationship/Environment    Primary Source of Support/Comfort  child(romeo);sibling(s)  -        Name of Support/Comfort Primary Source  Son and sister live with her  -        Lives With  child(romeo), adult;sibling(s)  -        Name(s) of Who Lives With Patient  Son and sister live with her  -           Resource/Environmental Concerns    Current Living Arrangements  home/apartment/condo  -        Resource/Environmental Concerns  none  -           Home Main Entrance    Number of Stairs, Main Entrance  two  -KH        Stair Railings, Main Entrance  railings safe and in good condition;railings on both sides of stairs  -           Cognitive Assessment/Intervention- PT/OT    Orientation Status (Cognition)  oriented x 3  -        Follows Commands (Cognition)  follows one step commands;WFL  -           ADL Assessment/Intervention    BADL Assessment/Intervention  bathing;upper body dressing;lower body dressing;grooming;feeding;toileting  -           Bathing Assessment/Intervention    Bathing [place] Level  bathing skills  -        Comment (Bathing)  Mod A  -           Upper Body Dressing Assessment/Training    Upper Body Dressing [place] Level  upper body dressing skills  -        Comment (Upper Body Dressing)  Set Up  -           Lower Body Dressing Assessment/Training    Lower Body Dressing [place] Level  lower body dressing skills  -        Comment (Lower Body Dressing)   Min A  -KH           Grooming Assessment/Training    [place] Level (Grooming)  grooming skills  -        Comment (Grooming)  Set Up  -           Self-Feeding Assessment/Training    [place] Level (Feeding)  feeding skills  -        Comment (Feeding)  Set Up  -           Toileting Assessment/Training    [place] Level (Toileting)  toileting skills  -        Comment (Toileting)  Min A  -KH           General ROM    GENERAL ROM COMMENTS  BUE WFL  -           MMT (Manual Muscle Testing)    General MMT Comments  BUE 3/5  -KH           Positioning and Restraints    Pre-Treatment Position  in bed  -        Post Treatment Position  bed  -        In Bed  supine;call light within reach;encouraged to call for assist;side rails up x2;with family/caregiver  -           Plan of Care Review    Plan of Care Reviewed With  patient  -           Clinical Impression (OT)    OT Diagnosis  weakness  -        Functional Level at Time of Evaluation (OT Eval)  To return home safely  -        Therapy Frequency (OT Eval)  3 times/wk 3-5 x per week  -        Anticipated Equipment Needs at Discharge (OT)  -- TBD  -        Anticipated Discharge Disposition (OT)  home with assist;home with 24/7 care  -           Planned OT Interventions    Planned Therapy Interventions (OT Eval)  activity tolerance training;adaptive equipment training;BADL retraining;functional balance retraining;occupation/activity based interventions;patient/caregiver education/training;ROM/therapeutic exercise;strengthening exercise;transfer/mobility retraining  -           OT Goals    Bathing Goal Selection (OT)  bathing, OT goal 1  -        Strength Goal Selection (OT)  strength, OT goal 1  -        Additional Documentation  Strength Goal Selection (OT) (Row)  -           Bathing Goal 1 (OT)    Activity/Assistive Device (Bathing Goal 1, OT)  bathing skills, all  -        [place] Level/Cues Needed (Bathing Goal 1, OT)   minimum assist (75% or more patient effort);set-up required  -        Time Frame (Bathing Goal 1, OT)  by discharge  -           Strength Goal 1 (OT)    Strength Goal 1 (OT)  Pt will increase BUE strength x 1 to increase ability to safely participate in self care tasks and functional transfers w/ increased independence.   -        Time Frame (Strength Goal 1, OT)  by discharge  -           Living Environment    Home Accessibility  stairs to enter home  -          User Key  (r) = Recorded By, (t) = Taken By, (c) = Cosigned By    Initials Name Effective Dates    Tea Wyatt, OT 04/17/18 -                OT Recommendation and Plan  Outcome Summary/Treatment Plan (OT)  Anticipated Equipment Needs at Discharge (OT): (TBD)  Anticipated Discharge Disposition (OT): home with assist, home with 24/7 care  Planned Therapy Interventions (OT Eval): activity tolerance training, adaptive equipment training, BADL retraining, functional balance retraining, occupation/activity based interventions, patient/caregiver education/training, ROM/therapeutic exercise, strengthening exercise, transfer/mobility retraining  Therapy Frequency (OT Eval): 3 times/wk(3-5 x per week)  Plan of Care Review  Plan of Care Reviewed With: patient  Plan of Care Reviewed With: patient    Outcome Measures     Row Name 12/19/18 0939             How much help from another is currently needed...    Putting on and taking off regular lower body clothing?  3  -KH      Bathing (including washing, rinsing, and drying)  3  -KH      Toileting (which includes using toilet bed pan or urinal)  3  -KH      Putting on and taking off regular upper body clothing  3  -KH      Taking care of personal grooming (such as brushing teeth)  3  -KH      Eating meals  3  -      Score  18  -KH         Functional Assessment    Outcome Measure Options  AM-PAC 6 Clicks Daily Activity (OT)  -        User Key  (r) = Recorded By, (t) = Taken By, (c) = Cosigned  By    Initials Name Provider Type    Tea Wyatt, OT Occupational Therapist          Time Calculation:   Time Calculation- OT     Row Name 12/19/18 0939             Time Calculation- OT    Total Timed Code Minutes- OT  25 minute(s)  -BLOSSOM        User Key  (r) = Recorded By, (t) = Taken By, (c) = Cosigned By    Initials Name Provider Type    Tea Wyatt, OT Occupational Therapist        Therapy Suggested Charges     Code   Minutes Charges    None           Therapy Charges for Today     Code Description Service Date Service Provider Modifiers Qty    62089017648 HC OT SELFCARE CURRENT 12/19/2018 Tea Henriquez OT GO, CK 1    72997728891 HC OT SELFCARE PROJECTED 12/19/2018 Tea Henriquez OT GO, CJ 1    [id number]  OT EVAL HIGH COMPLEXITY 2 12/19/2018 Tea Henriquez OT GO 1          OT G-codes  OT Professional Judgement Used?: Yes  OT Functional Scales Options: AM-PAC 6 Clicks Daily Activity (OT)  Functional Assessment Tool Used: FIM  Functional Limitation: Self care  Self Care Current Status (): At least 40 percent but less than 60 percent impaired, limited or restricted  Self Care Goal Status (): At least 20 percent but less than 40 percent impaired, limited or restricted    Tea Henriquez OT  12/19/2018

## 2018-12-19 NOTE — PLAN OF CARE
Problem: Patient Care Overview  Goal: Plan of Care Review  Outcome: Ongoing (interventions implemented as appropriate)   12/19/18 0014 12/19/18 0928   Coping/Psychosocial   Plan of Care Reviewed With --  patient   Plan of Care Review   Progress improving --      Goal: Discharge Needs Assessment  Outcome: Ongoing (interventions implemented as appropriate)   12/17/18 0140 12/17/18 0150   Disability   Equipment Currently Used at Home bipap/cpap;rollator --    Discharge Needs Assessment   Patient/Family Anticipates Transition to --  home   Patient/Family Anticipated Services at Transition --  none   Transportation Anticipated --  family or friend will provide       Problem: Fall Risk (Adult)  Goal: Identify Related Risk Factors and Signs and Symptoms  Outcome: Ongoing (interventions implemented as appropriate)   12/17/18 0206   Fall Risk (Adult)   Related Risk Factors (Fall Risk) environment unfamiliar   Signs and Symptoms (Fall Risk) presence of risk factors     Goal: Absence of Fall  Outcome: Ongoing (interventions implemented as appropriate)   12/19/18 1025   Fall Risk (Adult)   Absence of Fall making progress toward outcome       Problem: Nausea/Vomiting (Adult)  Goal: Identify Related Risk Factors and Signs and Symptoms  Outcome: Ongoing (interventions implemented as appropriate)   12/18/18 1130   Nausea/Vomiting (Adult)   Signs and Symptoms (Nausea/Vomiting) electrolyte changes     Goal: Adequate Hydration  Outcome: Ongoing (interventions implemented as appropriate)   12/18/18 1130   Nausea/Vomiting (Adult)   Adequate Hydration making progress toward outcome       Problem: Skin Injury Risk (Adult)  Goal: Skin Health and Integrity  Outcome: Ongoing (interventions implemented as appropriate)   12/19/18 1025   Skin Injury Risk (Adult)   Skin Health and Integrity making progress toward outcome       Problem: Self-Care Deficit (Adult,Obstetrics,Pediatric)  Goal: Identify Related Risk Factors and Signs and  Symptoms  Outcome: Ongoing (interventions implemented as appropriate)    Goal: Improved Ability to Perform BADL and IADL  Outcome: Ongoing (interventions implemented as appropriate)   12/19/18 1025   Self-Care Deficit (Adult,Obstetrics,Pediatric)   Improved Ability to Perform BADL and IADL making progress toward outcome

## 2018-12-19 NOTE — DISCHARGE PLACEMENT REQUEST
"Mary Kay Temple (63 y.o. Female)     Date of Birth Social Security Number Address Home Phone MRN    1955  [address]  [place] [address] [phone] [id number]    Denominational Marital Status          None        Admission Date Admission Type Admitting Provider Attending Provider Department, Room/Bed    12/16/18 Emergency Martin Antunez MD Baibars, Mhd Motaz, MD [address], [address]    Discharge Date Discharge Disposition Discharge Destination         [place] [facility] [place]              Attending Provider:  Edwar Wilkins MD    Allergies:  Zithromax [Azithromycin], Codeine, Penicillins, Phenobarbital    Isolation:  None   Infection:  None   Code Status:  CPR    Ht:  177.8 cm (70\")   Wt:  105 kg (232 lb 1.6 oz)    Admission Cmt:  None   Principal Problem:  None                Active Insurance as of 12/16/2018     Primary Coverage     Payor Plan Insurance Group Employer/Plan Group    MEDICARE MEDICARE A & B      Payor Plan Address Payor Plan Phone Number Payor Plan Fax Number Effective Dates    [address] [phone]  4/1/2008 - None Entered    Formerly [facility] [id number]       Subscriber Name Subscriber Birth Date Member ID       MARY KAY TEMPLE 1955 [id number]           Secondary Coverage     Payor Plan Insurance Group Employer/Plan Group    [place]      Payor Plan Address Payor Plan Phone Number Payor Plan Fax Number Effective Dates    [address] [phone]  10/3/2016 - None Entered    [facility] [place] [address]       Subscriber Name Subscriber Birth Date Member ID       MARY KAY TEMPLE 1955 [id number]                 Emergency Contacts      (Rel.) Home Phone Work Phone Mobile Phone    Larisa Denson (Sister) -- -- [phone]    WINDYPRIYA (Son) -- -- [phone]            Emergency Contact Information     Name Relation Home Work Mobile    Larisa Denson Sister   [phone]    PRIYA TEMPLE Son   [phone]    "       Insurance Information                MEDICARE/MEDICARE A & B Phone: [phone]    Subscriber: Mary Kay Goldstein Subscriber#: [id number]    Group#:  Precert#:         [place] Phone: [phone]    Subscriber: Mary Kay Goldstein Subscriber#: [id number]    Group#:  Precert#:           Treatment Team     Provider Relationship Specialty Contact    Edwar Wilkins MD Attending, Physician of [facility]  [phone]    Tracy Richardson, PT Physical Therapist Physical Therapy      Mony Mar, RN Registered Nurse --      Cesia Reyes, RRT Respiratory Therapist --      Neha Phelan Patient Care Technician --      Marvin Coulter MD Cardiologist Cardiology  [phone]          Problem List           Codes Noted - Resolved       Hospital    Intractable vomiting with nausea ICD-10-CM: R11.2  ICD-9-CM: 536.2 2018 - Present       Non-Hospital    Epilepsy (CMS/HCC) ICD-10-CM: G40.909  ICD-9-CM: 345.90 2016 - Present    Guillain Barré syndrome (CMS/HCC) ICD-10-CM: G61.0  ICD-9-CM: 357.0 2016 - Present             History & Physical      Martin Antunez MD at 2018  2:39 AM          Hospitalist History and Physical        Patient Identification  Name: Mary Kay Goldstein  Age/Sex: 63 y.o. female  :  1955        MRN: [id number]  Visit Number: [id number]  PCP: Shon Hewitt MD        Chief complaint nausea/vomiting, abdominal pain, diarrhea    History of Present Illness:  Patient is a 63 y.o. female who presents with complaints of epigastric abdominal pain, nausea/vomiting, with some associated diarrhea over the last 3 months. She claims her symptoms have worsened in severity over the last month. She was diagnosed with GI bleed in 2018 and transferred to [place], at which time EGD was obtained which showed gastric ulcers per her son; it also apparently showed a significant hiatal hernia. She was having similar  "GI symptoms then as well and was told that she probably needed a nissen fundoplication, but was instructed to lose weight before proceeding with surgery. Over the last 3 months, she has lost 50 lb primarily because she cannot keep any food down and even struggles with medication at times. She was seen by her PCP recently; she was given a liter of IV fluids and had some \"tests\" down, but has not received the results yet. Her son did think that one of the tests involved sending stool samples off for further study. Due to worsening GI symptoms and progressive generalized weakness, she decided to come to the ED for further evaluation.     Here, she was found to be significantly hypokalemia and hypomagnesemia. Liver enzymes were fairly unremarkable. Troponin was indeterminately elevated; patient denies chest pain or dyspnea and EKG showed only nonspecific changes. Patient's troponin was elevated to similar level when she had her GI bleed in April 2018 as well. She denies any known history of coronary artery disease. Hemoglobin was 9.5 (11.0 in 4/2018), WBC normal with no left shift, and platelets are elevated. PTT is normal, but INR is elevated at 1.7; she does not take chronic anticoagulation. Rectal exam revealed large external hemorrhoids but no signs of active bleeding or melena, only some soft brown stool. Stool did test mildly hemoccult positive, however. CT abdomen/pelvis showed some changes that could potentially suggest enteritis, as well as a moderate sliding scale hiatal hernia in the lower thorax containing stomach.     Review of Systems  Review of Systems   Constitutional: Positive for activity change, appetite change, fatigue and unexpected weight change. Negative for chills, diaphoresis and fever.   HENT: Positive for hearing loss. Negative for congestion, postnasal drip, rhinorrhea, sinus pressure, sinus pain and sore throat.    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 " Negative for photophobia, pain, discharge, redness and itching.   Respiratory: Negative for cough, shortness of breath and wheezing.    Cardiovascular: Negative for chest pain, palpitations and leg swelling.   Gastrointestinal: Positive for abdominal pain, diarrhea, nausea and vomiting. Negative for abdominal distention and constipation.   Endocrine: Negative for cold intolerance, heat intolerance, polydipsia, polyphagia and polyuria.   Genitourinary: Negative for difficulty urinating, dysuria, flank pain and hematuria.   Musculoskeletal: Positive for arthralgias and back pain. Negative for joint swelling, myalgias, neck pain and neck stiffness.   Skin: Negative for color change, pallor, rash and wound.   Allergic/Immunologic: Negative for environmental allergies, food allergies and immunocompromised state.   Neurological: Positive for weakness (generalized). Negative for dizziness, tremors, seizures, syncope, light-headedness, numbness and headaches.   Hematological: Negative for adenopathy. Does not bruise/bleed easily.   Psychiatric/Behavioral: Negative for agitation, behavioral problems and confusion.       History  Past Medical History:   Diagnosis Date   • Arthritis    • Asthma    • Blind    • Hypertension    • Low back pain    • Seizures (CMS/HCC)    • Stomach ulcer      *  Hearing impairment left ear    Past Surgical History:   Procedure Laterality Date   • APPENDECTOMY     • BREAST SURGERY      reduction   • CHOLECYSTECTOMY     • COLONOSCOPY     • EYE SURGERY     • HYSTERECTOMY       Family History   Problem Relation Age of Onset   • Cancer Mother    • Diabetes Mother    • Hypertension Mother    • Hypertension Father    • Heart disease Father      Social History     Tobacco Use   • Smoking status: Never Smoker   • Smokeless tobacco: Never Used   Substance Use Topics   • Alcohol use: No   • Drug use: No     Medications Prior to Admission   Medication Sig Dispense Refill Last Dose   • albuterol (PROVENTIL  HFA;VENTOLIN HFA) 108 (90 BASE) MCG/ACT inhaler Inhale 2 puffs Every 4 (Four) Hours As Needed for Wheezing or Shortness of Air.   12/16/2018 at am   • amLODIPine (NORVASC) 5 MG tablet Take 5 mg by mouth Daily.   12/16/2018 at am   • aspirin 81 MG EC tablet Take 81 mg by mouth Daily.   12/16/2018 at Unknown time   • cetirizine (zyrTEC) 10 MG tablet Take 10 mg by mouth Every Night.   12/15/2018 at pm   • FLUoxetine (PROzac) 20 MG capsule Take 20 mg by mouth Daily.   12/16/2018 at am   • gabapentin (NEURONTIN) 600 MG tablet Take 600 mg by mouth 3 (Three) Times a Day.   12/16/2018 at am   • lisinopril (PRINIVIL,ZESTRIL) 20 MG tablet Take 20 mg by mouth Daily.   12/16/2018 at am   • metFORMIN (GLUCOPHAGE) 1000 MG tablet Take 1,000 mg by mouth 2 (Two) Times a Day With Meals.   12/16/2018 at am   • montelukast (SINGULAIR) 10 MG tablet Take 10 mg by mouth every night.   12/15/2018 at pm   • OXcarbazepine (TRILEPTAL) 600 MG tablet Take 600 mg by mouth 2 (Two) Times a Day.   12/16/2018 at am   • oxyCODONE (ROXICODONE) 10 MG tablet Take 10 mg by mouth Every 6 (Six) Hours As Needed for Severe Pain .   12/16/2018 at am   • pantoprazole (PROTONIX) 40 MG EC tablet Take 40 mg by mouth 2 (Two) Times a Day.   12/16/2018 at am     Allergies:  Zithromax [azithromycin]; Codeine; Penicillins; and Phenobarbital    Objective     Vital Signs  Temp:  [98 °F (36.7 °C)-98.5 °F (36.9 °C)] 98.5 °F (36.9 °C)  Heart Rate:  [63-70] 70  Resp:  [18] 18  BP: (135-176)/() 145/72  Body mass index is 32.16 kg/m².    Physical Exam:  Physical Exam   Constitutional: She is oriented to person, place, and time.   Elderly female, no acute distress   HENT:   Head: Normocephalic and atraumatic.   Oral mucosa dry   Eyes:   Eyes do not track (blind in both)   Neck: Normal range of motion. Neck supple. No JVD present.   Cardiovascular: Normal rate, regular rhythm, normal heart sounds and intact distal pulses.   No murmur heard.  Pulmonary/Chest: Effort normal  and breath sounds normal. No respiratory distress. She has no wheezes. She exhibits no tenderness.   Abdominal: Soft. Bowel sounds are normal. She exhibits no distension. There is tenderness (in epigastric region, mild/moderate with minimal guarding).   Musculoskeletal: Normal range of motion.   Lymphadenopathy:     She has no cervical adenopathy.   Neurological: She is alert and oriented to person, place, and time.   No gross focal deficits appreciated   Skin: Skin is warm and dry. Capillary refill takes less than 2 seconds. No rash noted. No erythema. No pallor.   Psychiatric: She has a normal mood and affect. Her behavior is normal. Judgment and thought content normal.         Results Review:       Lab Results:  Results from last 7 days   Lab Units  12/16/18 2010   WBC 10*3/mm3  11.32   HEMOGLOBIN g/dL  9.5*   PLATELETS 10*3/mm3  504*         Results from last 7 days   Lab Units  12/16/18 2039   SODIUM mmol/L  132*   POTASSIUM mmol/L  2.3*   CHLORIDE mmol/L  89*   CO2 mmol/L  33.9*   BUN mg/dL  13   CREATININE mg/dL  0.63   CALCIUM mg/dL  8.4   GLUCOSE mg/dL  118*     Results from last 7 days   Lab Units  12/16/18 2039   MAGNESIUM mg/dL  1.1*     No results found for: HGBA1C  Results from last 7 days   Lab Units  12/16/18 2039   BILIRUBIN mg/dL  0.2   ALK PHOS U/L  93   AST (SGOT) U/L  33*   ALT (SGPT) U/L  17     Results from last 7 days   Lab Units  12/16/18 2039 12/16/18 1937   TROPONIN I ng/mL  0.116*  0.069*         Results from last 7 days   Lab Units  12/16/18 1937   INR   1.70*           I have reviewed the patient's laboratory results.    Imaging:  Imaging Results (last 72 hours)     Procedure Component Value Units Date/Time    XR Chest 1 View [[id number]] Resulted:  12/16/18 2250     Updated:  12/16/18 2250    CT Abdomen Pelvis With Contrast [[id number]] Resulted:  12/16/18 2247     Updated:  12/16/18 2248      Chest XR: no overt infiltrate, pulmonary edema or pleural effusion. Abnormal  right sided cardiac border, present on prior chest XR, at that time felt to be probable prominent right sided epicardial fat pad.    CT abdomen/pelvis:   Per virtual radiology, scattered fluid within the small bowel and in the ascending colon, may be mild mucosal thickening of the proximal ascending colon or secondary to under distention, uncomplicated enteritis is not excluded.  No other acute abnormalities are described in the report.  In the lower thorax a moderate sliding hiatal hernia is seen containing proximal stomach.           I have personally reviewed the patient's radiologic imaging.          EKG: NSR, HR 69, nonspecific ST and T wave abnormalities, but no overt ST changes to suggest acute ischemia appreciated. QTc prolonged at 497.     I have personally reviewed the patient's EKG.          Assessment/Plan     - Intractable nausea, vomiting, abdominal pain with some diarrhea x3 months, worsening over last month: there was suspicion per GI workup in April 2018 that these symptoms were related to sliding hiatal hernia. Consult general surgery for further assistance in workup and management. Keep NPO, hydrate with IV fluids. Will make antiemetics and analgesics available PRN. RN to notify me if patient produces diarrhea (last bowel movement apparently yesterday morning); will consider sending stool for further study including for C dificile toxin if truly having copious amounts of liquid stool.   - Microcytic anemia with drop in hemoglobin from 11 to 9.5 since April, in setting of history of GI bleed in April and EGD at that time which apparently showed gastric ulcers. Stool is hemoccult positive today. Will place on IV protonix BID. Consulting surgery as noted above. Repeat labs in the morning. Check iron profile, B12, folate.  - Critical hypokalemia, hypomagnesemia: replace per protocol. No doubt poor PO intake and GI fluid losses are contributing to these findings.  - Indeterminate troponin elevation: no  chest pain, no overt ST changes on EKG. Continue to trend cardiac enzymes. Obtain ECHO in the morning to evaluate further. Will go ahead and consult cardiology to determine whether any further workup is necessary.   - Possible underlying coagulopathy: INR is 1.70; not on any anticoagulation. Will repeat in the morning. May need to consider hematology consult if INR is truly elevated to this degree without anticoagulation on board. No known history of cirrhosis/liver dysfunction.   - Seizure disorder: continue home trileptal; check serum level (send out test) in the morning  - QT prolongation: avoid QT prolonging medication. Replace K+ and mag both of which are critically low and contributing to QT prolongation.     DVT/GI Prophylaxis: SCDs, IV Protonix BID    Estimated Length of Stay >2 midnights    I discussed the patient's findings, assessment and plan with the patient, her son, and her RN Shayla who was present during the entire interview and physical examination on [facility].    * Patient is high risk due to critical electrolyte disturbance, indeterminate troponin elevation    Martin Antunez MD  12/17/18  2:44 AM      Electronically signed by Martin Antunez MD at 12/17/2018  3:25 AM       ICU Vital Signs     Row Name 12/19/18 0906 12/19/18 0638 12/19/18 0606 12/19/18 0300 12/18/18 1925       Height and Weight    Weight  --  --  --  105 kg (232 lb 1.6 oz)  --    Weight Method  --  --  --  Bed scale  --       Vitals    Temp  --  98.3 °F (36.8 °C)  --  98.4 °F (36.9 °C)  --    Temp src  --  Oral  --  Oral  --    Pulse  --  78  --  84  --    Heart Rate Source  --  Monitor  --  Monitor  --    Resp  --  18  --  16  --    Resp Rate Source  --  Visual  --  Visual  --    BP  --  116/61  --  132/52  --    Noninvasive MAP (mmHg)  --  84  --  --  --    BP Location  --  Right arm  --  Right arm  --    BP Method  --  Automatic  --  Automatic  --    Patient Position  --  Lying  --  Lying  --       Oxygen Therapy     SpO2  --  97 %  93 %  91 %  --    Pulse Oximetry Type  --  --  Intermittent  --  --    Device (Oxygen Therapy)  room air  --  room air  --  room air    Row Name 12/18/18 1843 12/18/18 1837 12/18/18 1831 12/18/18 1432          Vitals    Temp  --  --  98.2 °F (36.8 °C)  98 °F (36.7 °C)     Temp src  --  --  Oral  Oral     Pulse  100  104  100  86     Heart Rate Source  --  Monitor  Monitor  Monitor     Resp  18 18 20 18     Resp Rate Source  --  Visual  Visual  Visual     BP  --  --  175/85  152/79     Noninvasive MAP (mmHg)  --  --  --  114     BP Location  --  --  Right arm  --     BP Method  --  --  Automatic  --     Patient Position  --  --  Lying  --        Oxygen Therapy    SpO2  96 %  95 %  96 %  95 %     Device (Oxygen Therapy)  --  room air  --  room air         Intake & Output (last day)       12/18 0701 - 12/19 0700 12/19 0701 - 12/20 0700    P.O. 720     I.V. (mL/kg) 985 (9.4)     IV Piggyback 100     Total Intake(mL/kg) 1805 (17.2)     Urine (mL/kg/hr) 1800 (0.7)     Total Output 1800     Net +5           Urine Unmeasured Occurrence 3 x         Lines, Drains & Airways    Active LDAs     None                Hospital Medications (active)       Dose Frequency Start End    albuterol (PROVENTIL) nebulizer solution 0.083% 2.5 mg/3mL 2.5 mg Every 6 Hours PRN 12/17/2018     Sig - Route: Take 2.5 mg by nebulization Every 6 (Six) Hours As Needed for Shortness of Air. - Nebulization    amLODIPine (NORVASC) tablet 5 mg 5 mg Daily 12/17/2018     Sig - Route: Take 1 tablet by mouth Daily. - Oral    cetirizine (zyrTEC) tablet 10 mg 10 mg Nightly 12/17/2018     Sig - Route: Take 1 tablet by mouth Every Night. - Oral    FLUoxetine (PROzac) capsule 20 mg 20 mg Daily 12/17/2018     Sig - Route: Take 1 capsule by mouth Daily. - Oral    gabapentin (NEURONTIN) capsule 400 mg 400 mg Every 8 Hours Scheduled 12/17/2018     Sig - Route: Take 1 capsule by mouth Every 8 (Eight) Hours. - Oral    ipratropium-albuterol (DUO-NEB)  "nebulizer solution 3 mL 3 mL Every 6 Hours PRN 12/17/2018     Sig - Route: Take 3 mL by nebulization Every 6 (Six) Hours As Needed for Wheezing or Shortness of Air. - Nebulization    iron sucrose (VENOFER) 200 mg in sodium chloride 0.9 % 100 mL IVPB 200 mg Every 24 Hours 12/17/2018 12/19/2018    Sig - Route: Infuse 200 mg into a venous catheter Daily. - Intravenous    Magnesium Sulfate 2 gram / 50mL Infusion (GIVE X 3 BAGS TO EQUAL 6GM TOTAL DOSE) - Mg 1.1 - 1.5 mg/dl 2 g As Needed 12/16/2018     Sig - Route: Infuse 50 mL into a venous catheter As Needed (See Administration Instructions). - Intravenous    Linked Group 1:  \"Or\" Linked Group Details        Magnesium Sulfate 2 gram Bolus, followed by 8 gram infusion (total Mg dose 10 grams)- Mg less than or equal to 1mg/dL 2 g As Needed 12/16/2018     Sig - Route: Infuse 50 mL into a venous catheter As Needed (See Administration Instructions). - Intravenous    Linked Group 1:  \"Or\" Linked Group Details        Magnesium Sulfate 4 gram infusion- Mg 1.6-1.9 mg/dL 4 g As Needed 12/16/2018     Sig - Route: Infuse 100 mL into a venous catheter As Needed (See Administration Instructions). - Intravenous    Linked Group 1:  \"Or\" Linked Group Details        montelukast (SINGULAIR) tablet 10 mg 10 mg Nightly 12/17/2018     Sig - Route: Take 1 tablet by mouth Every Night. - Oral    Morphine (PF) injection 1 mg 1 mg Every 4 Hours PRN 12/17/2018 12/27/2018    Sig - Route: Infuse 0.1 mL into a venous catheter Every 4 (Four) Hours As Needed for Severe Pain  (hold for oversedation, SBP<100). - Intravenous    nitroglycerin (NITROSTAT) SL tablet 0.4 mg 0.4 mg Every 5 Minutes PRN 12/17/2018     Sig - Route: Place 1 tablet under the tongue Every 5 (Five) Minutes As Needed for Chest Pain (Chest Pain With Systolic Blood Pressure Greater Than 100). - Sublingual    OXcarbazepine (TRILEPTAL) tablet 600 mg 600 mg Every 12 Hours Scheduled 12/17/2018     Sig - Route: Take 2 tablets by mouth Every " "12 (Twelve) Hours. - Oral    oxyCODONE (ROXICODONE) immediate release tablet 10 mg 10 mg Every 6 Hours PRN 12/17/2018     Sig - Route: Take 2 tablets by mouth Every 6 (Six) Hours As Needed for Severe Pain . - Oral    pantoprazole (PROTONIX) injection 40 mg 40 mg 2 Times Daily Before Meals 12/17/2018     Sig - Route: Infuse 10 mL into a venous catheter 2 (Two) Times a Day Before Meals. - Intravenous    potassium chloride (K-DUR,KLOR-CON) CR tablet 40 mEq 40 mEq Every 4 Hours 12/18/2018 12/18/2018    Sig - Route: Take 2 tablets by mouth Every 4 (Four) Hours. - Oral    potassium chloride (KLOR-CON) packet 40 mEq 40 mEq As Needed 12/16/2018     Sig - Route: Take 40 mEq by mouth As Needed (potassium replacement, see admin instructions). - Oral    Linked Group 2:  \"Or\" Linked Group Details        potassium chloride (MICRO-K) CR capsule 40 mEq 40 mEq As Needed 12/16/2018     Sig - Route: Take 4 capsules by mouth As Needed (potassium replacement.  see admin instructions). - Oral    Linked Group 2:  \"Or\" Linked Group Details        potassium chloride 10 mEq in 100 mL IVPB 10 mEq Every 1 Hour PRN 12/16/2018     Sig - Route: Infuse 100 mL into a venous catheter Every 1 (One) Hour As Needed (potassium protocol PERIPHERAL - see admin instructions). - Intravenous    Linked Group 2:  \"Or\" Linked Group Details        promethazine (PHENERGAN) 6.25 mg in sodium chloride 0.9 % 50 mL 6.25 mg Every 6 Hours PRN 12/17/2018     Sig - Route: Infuse 6.25 mg into a venous catheter Every 6 (Six) Hours As Needed for Nausea or Vomiting (Hold for oversedation). - Intravenous    sodium chloride 0.9 % flush 3 mL 3 mL Every 12 Hours Scheduled 12/17/2018     Sig - Route: Infuse 3 mL into a venous catheter Every 12 (Twelve) Hours. - Intravenous    sodium chloride 0.9 % flush 3-10 mL 3-10 mL As Needed 12/17/2018     Sig - Route: Infuse 3-10 mL into a venous catheter As Needed for Line Care. - Intravenous    sucralfate (CARAFATE) tablet 1 g 1 g 4 Times " Daily 12/17/2018     Sig - Route: Take 1 tablet by mouth 4 (Four) Times a Day. - Oral    traZODone (DESYREL) tablet 50 mg 50 mg Nightly PRN 12/17/2018     Sig - Route: Take 1 tablet by mouth At Night As Needed for Sleep. - Oral    aspirin EC tablet 81 mg (Discontinued) 81 mg Daily 12/17/2018 12/19/2018    Sig - Route: Take 1 tablet by mouth Daily. - Oral    sodium chloride 0.9 % with KCl 20 mEq/L infusion (Discontinued) 20 mL/hr Continuous 12/17/2018 12/19/2018    Sig - Route: Infuse 20 mL/hr into a venous catheter Continuous. - Intravenous            Lab Results (last 24 hours)     Procedure Component Value Units Date/Time    Hemoglobin [[id number]]  (Abnormal) Collected:  12/19/18 0900    Specimen:  Blood Updated:  12/19/18 0909     Hemoglobin 8.5 g/dL     Basic Metabolic Panel [[id number]]  (Abnormal) Collected:  12/19/18 0502    Specimen:  Blood Updated:  12/19/18 0542     Glucose 163 mg/dL      BUN <5 mg/dL      Creatinine 0.63 mg/dL      Sodium 134 mmol/L      Potassium 4.1 mmol/L      Chloride 97 mmol/L      CO2 30.3 mmol/L      Calcium 8.7 mg/dL      eGFR Non African Amer 96 mL/min/1.73      BUN/Creatinine Ratio --     Comment: Unable to calculate Bun/Crea Ratio.        Anion Gap 6.7 mmol/L     Narrative:       GFR Normal >60  Chronic Kidney Disease <60  Kidney Failure <15    Osmolality, Calculated [[id number]] Collected:  12/19/18 0502    Specimen:  Blood Updated:  12/19/18 0542     Osmolality Calc -- mOsm/kg      Comment: Unable to calculate.       CBC (No Diff) [[id number]]  (Abnormal) Collected:  12/19/18 0502    Specimen:  Blood Updated:  12/19/18 0520     WBC 6.46 10*3/mm3      RBC 3.78 10*6/mm3      Hemoglobin 7.5 g/dL      Hematocrit 27.1 %      MCV 71.7 fL      MCH 19.8 pg      MCHC 27.7 g/dL      RDW 17.4 %      RDW-SD 43.8 fl      MPV 9.1 fL      Platelets 482 10*3/mm3     Potassium [[id number]]  (Normal) Collected:  12/18/18 1719    Specimen:  Blood Updated:  12/18/18 1811     Potassium 4.3 mmol/L          Orders (last 24 hrs)     Start     Ordered    12/19/18 1239  Discontinue IV  Once      12/19/18 1241    12/19/18 1236  Discharge patient  Once      12/19/18 1241    12/19/18 0830  Hemoglobin  STAT      12/19/18 0830    12/19/18 0600  Basic Metabolic Panel  Morning Draw      12/18/18 1222    12/19/18 0600  CBC (No Diff)  Morning Draw      12/18/18 1227    12/19/18 0543  Osmolality, Calculated  Once      12/19/18 0542    12/19/18 0000  gabapentin (NEURONTIN) 400 MG capsule  Every 8 Hours Scheduled      12/19/18 1241    12/19/18 0000  SITagliptin (JANUVIA) 50 MG tablet  Daily      12/19/18 1241    12/19/18 0000  Referral to Home Health      12/19/18 1241    12/19/18 0000  ondansetron ODT (ZOFRAN ODT) 4 MG disintegrating tablet  Every 8 Hours PRN      12/19/18 1241    12/18/18 1900  Potassium  Once,   Status:  Canceled      12/18/18 1728    12/18/18 1800  Potassium  Once      12/18/18 1222    12/18/18 1200  potassium chloride (K-DUR,KLOR-CON) CR tablet 40 mEq  Every 4 Hours      12/18/18 1032    12/17/18 2100  montelukast (SINGULAIR) tablet 10 mg  Nightly      12/17/18 1127    12/17/18 2100  cetirizine (zyrTEC) tablet 10 mg  Nightly      12/17/18 1127    12/17/18 1400  gabapentin (NEURONTIN) capsule 400 mg  Every 8 Hours Scheduled      12/17/18 1159    12/17/18 1300  amLODIPine (NORVASC) tablet 5 mg  Daily      12/17/18 1127    12/17/18 1300  aspirin EC tablet 81 mg  Daily,   Status:  Discontinued      12/17/18 1127    12/17/18 1300  FLUoxetine (PROzac) capsule 20 mg  Daily      12/17/18 1127    12/17/18 1300  sucralfate (CARAFATE) tablet 1 g  4 Times Daily      12/17/18 1127    12/17/18 1126  traZODone (DESYREL) tablet 50 mg  Nightly PRN      12/17/18 1127    12/17/18 1126  oxyCODONE (ROXICODONE) immediate release tablet 10 mg  Every 6 Hours PRN      12/17/18 1127    12/17/18 1126  albuterol (PROVENTIL) nebulizer solution 0.083% 2.5 mg/3mL  Every 6 Hours PRN      12/17/18 1127    12/17/18 1000  iron sucrose  (VENOFER) 200 mg in sodium chloride 0.9 % 100 mL IVPB  Every 24 Hours      12/17/18 0804    12/17/18 1000  sodium chloride 0.9 % with KCl 20 mEq/L infusion  Continuous,   Status:  Discontinued      12/17/18 0805    12/17/18 0900  OXcarbazepine (TRILEPTAL) tablet 600 mg  Every 12 Hours Scheduled      12/17/18 0247    12/17/18 0730  pantoprazole (PROTONIX) injection 40 mg  2 Times Daily Before Meals      12/17/18 0306    12/17/18 0242  ipratropium-albuterol (DUO-NEB) nebulizer solution 3 mL  Every 6 Hours PRN      12/17/18 0243    12/17/18 0240  promethazine (PHENERGAN) 6.25 mg in sodium chloride 0.9 % 50 mL  Every 6 Hours PRN      12/17/18 0241    12/17/18 0239  Morphine (PF) injection 1 mg  Every 4 Hours PRN      12/17/18 0241 12/17/18 0123  nitroglycerin (NITROSTAT) SL tablet 0.4 mg  Every 5 Minutes PRN      12/17/18 0123    12/17/18 0123  sodium chloride 0.9 % flush 3 mL  Every 12 Hours Scheduled      12/17/18 0123    12/17/18 0123  sodium chloride 0.9 % flush 3-10 mL  As Needed      12/17/18 0123 12/16/18 2359  Magnesium Sulfate 2 gram Bolus, followed by 8 gram infusion (total Mg dose 10 grams)- Mg less than or equal to 1mg/dL  As Needed      12/16/18 2359 12/16/18 2359  Magnesium Sulfate 2 gram / 50mL Infusion (GIVE X 3 BAGS TO EQUAL 6GM TOTAL DOSE) - Mg 1.1 - 1.5 mg/dl  As Needed      12/16/18 2359 12/16/18 2359  Magnesium Sulfate 4 gram infusion- Mg 1.6-1.9 mg/dL  As Needed      12/16/18 2359 12/16/18 2359  potassium chloride (MICRO-K) CR capsule 40 mEq  As Needed      12/16/18 2359 12/16/18 2359  potassium chloride (KLOR-CON) packet 40 mEq  As Needed      12/16/18 2359 12/16/18 2359  potassium chloride 10 mEq in 100 mL IVPB  Every 1 Hour PRN      12/16/18 5030    Unscheduled  ECG 12 Lead  As Needed     Comments:  Nurse to Release if Patient Expericences Acute Chest Pain or Dysrhythmias    12/17/18 0123    Unscheduled  Potassium  As Needed     Comments:  For Ventricular Arrhythmias       18    Unscheduled  Magnesium  As Needed     Comments:  For Ventricular Arrhythmias      18    Unscheduled  Troponin  As Needed     Comments:  For Chest Pain      18    Unscheduled  Digoxin Level  As Needed     Comments:  For Atrial Arrhythmias      18    Unscheduled  Blood Gas, Arterial  As Needed     Comments:  Per [employer] PolicyNotify Physician      18    Unscheduled  Magnesium  As Needed      18    Unscheduled  Potassium  As Needed      18    --  aspirin 81 MG EC tablet  Daily      18    --  oxyCODONE (ROXICODONE) 10 MG tablet  Every 6 Hours PRN      18    --  amLODIPine (NORVASC) 5 MG tablet  Daily      18    --  cetirizine (zyrTEC) 10 MG tablet  Nightly      18    --  metFORMIN (GLUCOPHAGE) 1000 MG tablet  2 Times Daily With Meals      18    --  pantoprazole (PROTONIX) 40 MG EC tablet  2 Times Daily      18    --  lisinopril (PRINIVIL,ZESTRIL) 20 MG tablet  Daily      18    --  OXcarbazepine (TRILEPTAL) 600 MG tablet  2 Times Daily      18    --  FLUoxetine (PROzac) 20 MG capsule  Daily      18    --  sucralfate (CARAFATE) 1 g tablet  4 Times Daily      18 1105    --  traZODone (DESYREL) 50 MG tablet  Nightly      18 1105          Operative/Procedure Notes (last 24 hours) (Notes from 2018  1:50 PM through 2018  1:50 PM)     No notes of this type exist for this encounter.        Physician Progress Notes (last 24 hours) (Notes from 2018  1:50 PM through 2018  1:50 PM)     No notes of this type exist for this encounter.             Discharge Summary      Edwar Wilkins MD at 2018 12:44 PM              [facility] DISCHARGE SUMMARY    Patient Identification:  Name:  Mary Kay Goldstein  Age:  63 y.o.  Sex:  female  :  1955  MRN:  [id number]  Visit Number:  [id number]    Date  "of Admission: 12/16/2018  Date of Discharge:  12/19/2018     PCP: Shon Hewitt MD    DISCHARGE DIAGNOSIS  -Chronic nausea and vomiting and diarrhea, medication induced; metformin and high gabapentin, resolved  -History of upper GI bleed from peptic ulcer disease   -Chronic iron deficiency anemia, ferrous saturation is 5%   -Chronic epigastric abdominal pain, likely from severe hiatal hernia   -Large hiatal hernia, for surgical evaluation in the outpatient setting  -History of seizure disorder on oxcarbazepine and gabapentin   -Severe hypokalemia, due to GI loss, with hypomagnesemia, replaced       HOSPITAL COURSE  Patient is a 63 y.o. female presented to [facility] complaining of persistent nausea vomiting and diarrhea.  Please see the admitting history and physical for further details.  The patient was evaluated carefully with her medications and its believed that the patient is experiencing side effects from combination of metformin and high-dose of gabapentin, reviewed these medicines with the patient and her son at the bedside in extensive details.  4 chronic large hiatal hernia unfortunately is a course for chronic abdominal pain and it seems that taking oxycodone that she is doing at home does not affect her upper GI symptoms.  Additionally the patient has been adjusted on her medications and gradually was started on the diet in house starting from clears and then up with her regular diet.  The patient is tolerating the diet very fine and she is almost symptoms 40 however with palpation on the left upper quadrant and epigastrium she does have a tenderness without rebound from the large hiatal hernia.  The patient will be requested to follow-up with the surgical service in the outpatient setting within the next week or so, patient and son prefers to spend Moon before intervening with her hiatal hernia.  Patient seen and examined today she is feeling herself \"perfect\".  Hemoglobin was " checked as noted to have a drop in the earlier lab however repeated 1 seems to be the same as her previous baseline.  Therefore she will be continued on aspirin along with a PPI and sucralfate.  She did great with physical therapy.        VITAL SIGNS:  Temp:  [98 °F (36.7 °C)-98.4 °F (36.9 °C)] 98.3 °F (36.8 °C)  Heart Rate:  [] 78  Resp:  [16-20] 18  BP: (116-175)/(52-85) 116/61  SpO2:  [91 %-97 %] 97 %  on   ;   Device (Oxygen Therapy): room air    Body mass index is 33.3 kg/m².  Wt Readings from Last 3 Encounters:   12/19/18 105 kg (232 lb 1.6 oz)   04/04/18 109 kg (240 lb)   08/24/16 97.1 kg (214 lb)       PHYSICAL EXAM:  Constitutional:  Well-developed and well-nourished.  No respiratory distress.    Legally blind, hard of hearing  HENT:  Head:  Normocephalic and atraumatic.  Mouth:  Moist mucous membranes.  Better compared to yesterday, pale conjunctiva was noted  Eyes:  Conjunctivae and EOM are normal.  Pupils are equal, round, and reactive to light.  No scleral icterus.    Neck:  Neck supple.  No JVD present.    Cardiovascular:  Normal rate, regular rhythm and normal heart sounds with no murmur.  Pulmonary/Chest:  No respiratory distress, no wheezes, no crackles, with normal breath sounds and good air movement.  Abdominal:  Soft.  Bowel sounds are normal.  No distension , mild tenderness in the epigastrium and left upper quadrant area similar to yesterday, no rebound.   Musculoskeletal:  No edema, no tenderness, and no deformity.  No red or swollen joints anywhere.    Neurological:  No new lateralization  Skin:  Skin is warm and dry. No rash noted. No pallor.   Peripheral vascular:  Strong pulses in all 4 extremities with no clubbing, no cyanosis, no edema.           DISCHARGE DISPOSITION   Stable    DISCHARGE MEDICATIONS:     Discharge Medications      New Medications      Instructions Start Date   gabapentin 400 MG capsule  Commonly known as:  NEURONTIN  Replaces:  gabapentin 600 MG tablet   400 mg,  Oral, Every 8 Hours Scheduled      ondansetron ODT 4 MG disintegrating tablet  Commonly known as:  ZOFRAN ODT   4 mg, Oral, Every 8 Hours PRN      SITagliptin 50 MG tablet  Commonly known as:  JANUVIA   50 mg, Oral, Daily         Continue These Medications      Instructions Start Date   albuterol sulfate  (90 Base) MCG/ACT inhaler  Commonly known as:  PROVENTIL HFA;VENTOLIN HFA;PROAIR HFA   2 puffs, Inhalation, Every 4 Hours PRN      amLODIPine 5 MG tablet  Commonly known as:  NORVASC   5 mg, Oral, Daily      aspirin 81 MG EC tablet   81 mg, Oral, Daily      cetirizine 10 MG tablet  Commonly known as:  zyrTEC   10 mg, Oral, Nightly      FLUoxetine 20 MG capsule  Commonly known as:  PROzac   20 mg, Oral, Daily      montelukast 10 MG tablet  Commonly known as:  SINGULAIR   10 mg, Oral, Nightly      OXcarbazepine 600 MG tablet  Commonly known as:  TRILEPTAL   600 mg, Oral, 2 Times Daily      oxyCODONE 10 MG tablet  Commonly known as:  ROXICODONE   10 mg, Oral, Every 6 Hours PRN      pantoprazole 40 MG EC tablet  Commonly known as:  PROTONIX   40 mg, Oral, 2 Times Daily      sucralfate 1 g tablet  Commonly known as:  CARAFATE   1 g, Oral, 4 Times Daily      traZODone 50 MG tablet  Commonly known as:  DESYREL   50 mg, Oral, Nightly         Stop These Medications    gabapentin 600 MG tablet  Commonly known as:  NEURONTIN  Replaced by:  gabapentin 400 MG capsule     lisinopril 20 MG tablet  Commonly known as:  PRINIVIL,ZESTRIL     metFORMIN 1000 MG tablet  Commonly known as:  GLUCOPHAGE              Future Appointments   Date Time Provider Department Center   1/15/2019  1:20 PM Violette Julio PA-C [facility] None     Your Scheduled Appointments    Sidney 15, 2019  1:20 PM EST  Follow Up with Violette Julio PA-C  [facility] (--) [address] [id number] [phone]   Arrive 15 minutes prior to appointment.        Additional Instructions for the Follow-ups that You Need  to Schedule     Referral to Home Health   As directed      Face to Face Visit Date:  12/19/2018    Follow-up Provider for Plan of Care?:  I treated the patient in an acute care facility and will not continue treatment after discharge.    Follow-up Provider:  SHON HEWITT [[id number]]    Reason/Clinical Findings:  PErsistent N/V/D - BS management    Describe mobility limitations that make leaving home difficult:  blind, semi-deaf and needs special transportation    Nursing/Therapeutic Services Requested:  Skilled Nursing    Skilled nursing orders:  Medication education Cardiopulmonary assessments    Frequency:  1 Week 1           Follow-up Information     [facility] REF[facility] [place] [facility] .    Specialty:  Home Health Services  Contact information:  [address]  [facility] [address]           Shon Hewitt MD .    Specialty:  Family Medicine  Contact information:  [address] [place] [address] [phone]                    TEST  RESULTS PENDING AT DISCHARGE   Order Current Status    Carbamazepine Level, Free & Total In process    Oxcarbazepine Level In process           CODE STATUS  Code Status and Medical Interventions:   Ordered at: 12/16/18 9420     Level Of Support Discussed With:    Patient     Code Status:    CPR     Medical Interventions (Level of Support Prior to Arrest):    Full       Edwar Wilkins MD  12/19/18  12:44 PM    Please note that this discharge summary required more than 30 minutes to complete.    Please send a copy of this dictation to the following providers:  Shon Hewitt MD    Electronically signed by Edwar Wilkins MD at 12/19/2018 12:49 PM       Discharge Order (From admission, onward)    Start     Ordered    12/19/18 1236  Discharge patient  Once     Expected Discharge Date:  12/19/18    Discharge Disposition:  Home-Health Care [facility] [place]    Physician of Record for Attribution - Please select from Treatment  Team:  SAURAV LOPEZ [[id number]]    Review needed by CMO to determine Physician of Record:  No       Question Answer Comment   Physician of Record for Attribution - Please select from Treatment Team SAURAV LOPEZ    Review needed by CMO to determine Physician of Record No        12/19/18 1250

## 2018-12-20 LAB
ABO + RH BLD: NORMAL
ABO + RH BLD: NORMAL
BH BB BLOOD EXPIRATION DATE: NORMAL
BH BB BLOOD EXPIRATION DATE: NORMAL
BH BB BLOOD TYPE BARCODE: 5100
BH BB BLOOD TYPE BARCODE: 5100
BH BB DISPENSE STATUS: NORMAL
BH BB DISPENSE STATUS: NORMAL
BH BB PRODUCT CODE: NORMAL
BH BB PRODUCT CODE: NORMAL
BH BB UNIT NUMBER: NORMAL
BH BB UNIT NUMBER: NORMAL
CARBAMAZEPINE FREE SERPL-MCNC: ABNORMAL UG/ML (ref 0.6–4.2)
CARBAMAZEPINE SERPL-MCNC: 0.6 UG/ML (ref 4–12)
CROSSMATCH INTERPRETATION: NORMAL
CROSSMATCH INTERPRETATION: NORMAL
OXCARBAZEPINE: 34 UG/ML (ref 10–35)
UNIT  ABO: NORMAL
UNIT  ABO: NORMAL
UNIT  RH: NORMAL
UNIT  RH: NORMAL

## 2019-01-15 ENCOUNTER — DOCUMENTATION (OUTPATIENT)
Dept: GASTROENTEROLOGY | Facility: CLINIC | Age: 64
End: 2019-01-15

## 2019-01-15 ENCOUNTER — OFFICE VISIT (OUTPATIENT)
Dept: GASTROENTEROLOGY | Facility: CLINIC | Age: 64
End: 2019-01-15

## 2019-01-15 VITALS
BODY MASS INDEX: 33.21 KG/M2 | WEIGHT: 232 LBS | HEIGHT: 70 IN | DIASTOLIC BLOOD PRESSURE: 90 MMHG | OXYGEN SATURATION: 91 % | SYSTOLIC BLOOD PRESSURE: 193 MMHG | HEART RATE: 59 BPM

## 2019-01-15 DIAGNOSIS — R11.2 NAUSEA AND VOMITING, INTRACTABILITY OF VOMITING NOT SPECIFIED, UNSPECIFIED VOMITING TYPE: ICD-10-CM

## 2019-01-15 DIAGNOSIS — K92.1 HEMATOCHEZIA: ICD-10-CM

## 2019-01-15 DIAGNOSIS — Z87.11 HISTORY OF STOMACH ULCERS: ICD-10-CM

## 2019-01-15 DIAGNOSIS — R19.7 DIARRHEA, UNSPECIFIED TYPE: ICD-10-CM

## 2019-01-15 DIAGNOSIS — K44.9 HIATAL HERNIA: Primary | ICD-10-CM

## 2019-01-15 PROCEDURE — 99203 OFFICE O/P NEW LOW 30 MIN: CPT | Performed by: PHYSICIAN ASSISTANT

## 2019-01-15 RX ORDER — POLYETHYLENE GLYCOL 3350 17 G/17G
17 POWDER, FOR SOLUTION ORAL DAILY
COMMUNITY

## 2019-01-15 RX ORDER — CHLORTHALIDONE 25 MG/1
25 TABLET ORAL DAILY
COMMUNITY
End: 2019-04-22

## 2019-01-15 NOTE — PROGRESS NOTES
Patient was given stool kit to do stool samples as patient lives in [facility] and will be taking stool lab to a facility there. I told patient's son to call before they take sample to make sure it is received correctly. Patient and son voiced understanding.

## 2019-01-15 NOTE — PROGRESS NOTES
Chief Complaint   Patient presents with   • Diarrhea   • Vomiting     Mary Kay Goldstein is a 63 y.o. female who presents to the office today at the request of No ref. provider found for Diarrhea and Vomiting.    HPI  The patient was seen for a GI evaluation of diarrhea and vomiting.  Patient is also concerned about her hiatal hernia that she is interested having repaired.  The patient is having trouble with vomiting after food due to the contents getting stuck in her hiatal hernia.  Patient reports that her diarrhea has been going on since last April.  CT scan of her abdomen was completed during her hospitalization in December.  It revealed a large hiatal hernia.  Dr. Neff performed an EGD last spring.  Gastric ulcers were discovered. She is having hematochezia at times which they have attributed to hemorrhoids.  She takes Protonix 40 mg daily.  Her last colonoscopy was over 10 years ago in [place].  Medical, surgical, social and family histories were reviewed and are listed below.        Review of Systems   Constitutional: Positive for fatigue. Negative for chills and fever.   HENT: Positive for hearing loss and trouble swallowing.    Eyes: Positive for visual disturbance.   Respiratory: Positive for choking and shortness of breath. Negative for cough and chest tightness.    Cardiovascular: Negative for chest pain.   Gastrointestinal: Positive for abdominal distention, abdominal pain, anal bleeding, diarrhea, nausea, rectal pain and vomiting. Negative for blood in stool and constipation.   Endocrine: Negative.    Genitourinary: Negative for difficulty urinating.   Musculoskeletal: Positive for back pain. Negative for neck pain.   Skin: Negative for pallor.   Allergic/Immunologic: Negative.    Neurological: Positive for dizziness and light-headedness. Negative for headaches.   Hematological: Negative.    Psychiatric/Behavioral: Negative.        ACTIVE PROBLEMS:   Specialty Problems        Gastroenterology Problems     Intractable vomiting with nausea              PAST MEDICAL HISTORY:  Past Medical History:   Diagnosis Date   • Arthritis    • Asthma    • Blind    • Hypertension    • Low back pain    • Seizures (CMS/HCC)    • Stomach ulcer        SURGICAL HISTORY:  Past Surgical History:   Procedure Laterality Date   • APPENDECTOMY     • BREAST SURGERY      reduction   • CHOLECYSTECTOMY     • COLONOSCOPY     • EYE SURGERY     • HYSTERECTOMY         FAMILY HISTORY:  Family History   Problem Relation Age of Onset   • Cancer Mother    • Diabetes Mother    • Hypertension Mother    • Hypertension Father    • Heart disease Father        SOCIAL HISTORY:  Social History     Tobacco Use   • Smoking status: Never Smoker   • Smokeless tobacco: Never Used   Substance Use Topics   • Alcohol use: No       CURRENT MEDICATION:    Current Outpatient Medications:   •  albuterol (PROVENTIL HFA;VENTOLIN HFA) 108 (90 BASE) MCG/ACT inhaler, Inhale 2 puffs Every 4 (Four) Hours As Needed for Wheezing or Shortness of Air., Disp: , Rfl:   •  amLODIPine (NORVASC) 5 MG tablet, Take 5 mg by mouth Daily., Disp: , Rfl:   •  cetirizine (zyrTEC) 10 MG tablet, Take 10 mg by mouth Every Night., Disp: , Rfl:   •  chlorthalidone (HYGROTON) 25 MG tablet, Take 25 mg by mouth Daily., Disp: , Rfl:   •  FLUoxetine (PROzac) 20 MG capsule, Take 20 mg by mouth Daily., Disp: , Rfl:   •  gabapentin (NEURONTIN) 400 MG capsule, Take 1 capsule by mouth Every 8 (Eight) Hours for 30 days., Disp: 90 capsule, Rfl: 0  •  linaclotide (LINZESS) 72 MCG capsule capsule, Take 72 mcg by mouth Every Morning Before Breakfast., Disp: , Rfl:   •  montelukast (SINGULAIR) 10 MG tablet, Take 10 mg by mouth every night., Disp: , Rfl:   •  ondansetron ODT (ZOFRAN ODT) 4 MG disintegrating tablet, Take 1 tablet by mouth Every 8 (Eight) Hours As Needed for Nausea or Vomiting., Disp: 20 tablet, Rfl: 0  •  OXcarbazepine (TRILEPTAL) 600 MG tablet, Take 600 mg by mouth 2 (Two) Times a Day., Disp: , Rfl:  "  •  oxyCODONE (ROXICODONE) 10 MG tablet, Take 10 mg by mouth Every 6 (Six) Hours As Needed for Severe Pain ., Disp: , Rfl:   •  pantoprazole (PROTONIX) 40 MG EC tablet, Take 40 mg by mouth 2 (Two) Times a Day., Disp: , Rfl:   •  polyethylene glycol (MIRALAX) packet, Take 17 g by mouth Daily., Disp: , Rfl:   •  SITagliptin (JANUVIA) 50 MG tablet, Take 1 tablet by mouth Daily for 30 days., Disp: 30 tablet, Rfl: 0  •  sucralfate (CARAFATE) 1 g tablet, Take 1 g by mouth 4 (Four) Times a Day., Disp: , Rfl:   •  traZODone (DESYREL) 50 MG tablet, Take 50 mg by mouth Every Night., Disp: , Rfl:     ALLERGIES:  Zithromax [azithromycin]; Amoxicillin; Codeine; Penicillins; and Phenobarbital    VISIT VITALS:  BP (!) 193/90 (BP Location: Left arm, Patient Position: Sitting, Cuff Size: Adult)   Pulse 59   Ht 177.8 cm (70\")   Wt 105 kg (232 lb)   SpO2 91%   BMI 33.29 kg/m²     PHYSICAL EXAMINATION:  Physical Exam   Constitutional: She is oriented to person, place, and time. She appears well-developed and well-nourished. No distress.   HENT:   Head: Normocephalic and atraumatic.   Left Ear: External ear normal.   Nose: Nose normal.   Mouth/Throat: Oropharynx is clear and moist. No oropharyngeal exudate.   Deaf in right ear   Eyes: Conjunctivae are normal. Right eye exhibits no discharge. Left eye exhibits no discharge. No scleral icterus.   Legally blind   Neck: Normal range of motion. Neck supple. No JVD present. No tracheal deviation present. No thyromegaly present.   Cardiovascular: Normal rate, regular rhythm, normal heart sounds and intact distal pulses. Exam reveals no gallop and no friction rub.   No murmur heard.  Pulmonary/Chest: Effort normal and breath sounds normal. No stridor. No respiratory distress. She has no wheezes. She has no rales. She exhibits no tenderness.   Abdominal: Soft. Bowel sounds are normal. She exhibits no distension and no mass. There is tenderness (epigastric and RUQ). There is guarding " (epigastric region). There is no rebound. No hernia.   Genitourinary: Rectal exam shows guaiac negative stool.   Musculoskeletal: She exhibits no edema, tenderness or deformity.   Lymphadenopathy:     She has no cervical adenopathy.   Neurological: She is alert and oriented to person, place, and time. She has normal reflexes. She displays normal reflexes. No cranial nerve deficit. She exhibits normal muscle tone. Coordination normal.   Skin: Skin is warm and dry. No rash noted. She is not diaphoretic. No erythema. No pallor.   Psychiatric: She has a normal mood and affect. Her behavior is normal. Judgment and thought content normal.       Assessment/Plan      Diagnosis Plan   1. Hiatal hernia  Ambulatory Referral to General Surgery   2. Nausea and vomiting, intractability of vomiting not specified, unspecified vomiting type  Clostridium Difficile Toxin - Stool, Per Rectum    Stool Culture - Stool, Per Rectum    Ova & Parasite Result - Stool, Per Rectum    Ambulatory Referral to General Surgery   3. Diarrhea, unspecified type  Clostridium Difficile Toxin - Stool, Per Rectum    Stool Culture - Stool, Per Rectum    Ova & Parasite Result - Stool, Per Rectum    Ambulatory Referral to General Surgery   4. Hematochezia  Clostridium Difficile Toxin - Stool, Per Rectum    Stool Culture - Stool, Per Rectum    Ova & Parasite Result - Stool, Per Rectum    Ambulatory Referral to General Surgery   5. History of stomach ulcers  Ambulatory Referral to General Surgery     The patient will be referred to Dr. Nelson to discuss hiatal hernia repair and possible colonoscopy.  Stool studies will also be ordered to rule out infectious etiology of diarrhea.  Patient voiced understanding and agreement of recommendations.  Return in about 2 months (around 3/15/2019).         Patient's Body mass index is 33.29 kg/m². BMI is above normal parameters. Recommendations include: educational material.      JEREMY Aleman

## 2019-01-29 ENCOUNTER — OFFICE VISIT (OUTPATIENT)
Dept: SURGERY | Facility: CLINIC | Age: 64
End: 2019-01-29

## 2019-01-29 VITALS
BODY MASS INDEX: 32.64 KG/M2 | TEMPERATURE: 97.9 F | WEIGHT: 228 LBS | SYSTOLIC BLOOD PRESSURE: 154 MMHG | DIASTOLIC BLOOD PRESSURE: 88 MMHG | RESPIRATION RATE: 18 BRPM | OXYGEN SATURATION: 98 % | HEIGHT: 70 IN | HEART RATE: 70 BPM

## 2019-01-29 DIAGNOSIS — K44.9 PARAESOPHAGEAL HERNIA: Primary | ICD-10-CM

## 2019-01-29 DIAGNOSIS — R19.7 DIARRHEA, UNSPECIFIED TYPE: ICD-10-CM

## 2019-01-29 DIAGNOSIS — R11.2 NAUSEA AND VOMITING, INTRACTABILITY OF VOMITING NOT SPECIFIED, UNSPECIFIED VOMITING TYPE: ICD-10-CM

## 2019-01-29 PROCEDURE — 99204 OFFICE O/P NEW MOD 45 MIN: CPT | Performed by: SURGERY

## 2019-01-29 NOTE — PROGRESS NOTES
1/29/2019    Patient Information  Mary Kay Goldstein  [address]  [place] [address]  1955  [phone] (home)     Chief Complaint   Patient presents with   • Hernia     Referred for Hiatal Hernia and possible Colonoscopy       HPI  Patient 63-year-old white female who has a very large paraesophageal hernia.  Symptoms include nausea and vomiting, diarrhea, and abdominal pain.  She's been having these symptoms for a few years.  No other associated symptoms    Review of Systems:    The Past medical history, family history, social history, medication list, allergies, and Review of Systems has been reviewed and confirmed.      General: negative  Integumentary: negative  Eyes: Blind  ENT: hearing loss  Respiratory: negative  Gastrointestinal: nausea/vomiting, diarrhea and abdominal pain  Cardiovascular: negative  Neurological: dizziness  Psychiatric: anxiety  Hematologic/Lymphatic: negative  Genitourinary: negative  Musculoskeletal: back pain  Endocrine: negative  Breasts: negative      Patient Active Problem List   Diagnosis   • Epilepsy (CMS/HCC)   • Guillain Barré syndrome (CMS/HCC)   • Nausea and vomiting   • Hematochezia   • Diarrhea   • Hiatal hernia   • History of stomach ulcers         Past Medical History:   Diagnosis Date   • Arthritis    • Asthma    • Blind    • Hypertension    • Low back pain    • Seizures (CMS/HCC)    • Stomach ulcer          Past Surgical History:   Procedure Laterality Date   • APPENDECTOMY     • BREAST SURGERY      reduction   • CHOLECYSTECTOMY     • COLONOSCOPY     • EYE SURGERY     • HYSTERECTOMY           Family History   Problem Relation Age of Onset   • Cancer Mother    • Diabetes Mother    • Hypertension Mother    • Hypertension Father    • Heart disease Father          Social History     Tobacco Use   • Smoking status: Never Smoker   • Smokeless tobacco: Never Used   Substance Use Topics   • Alcohol use: No   • Drug use: No       Current Outpatient Medications   Medication Sig  "Dispense Refill   • albuterol (PROVENTIL HFA;VENTOLIN HFA) 108 (90 BASE) MCG/ACT inhaler Inhale 2 puffs Every 4 (Four) Hours As Needed for Wheezing or Shortness of Air.     • amLODIPine (NORVASC) 5 MG tablet Take 5 mg by mouth Daily.     • cetirizine (zyrTEC) 10 MG tablet Take 10 mg by mouth Every Night.     • chlorthalidone (HYGROTON) 25 MG tablet Take 25 mg by mouth Daily.     • FLUoxetine (PROzac) 20 MG capsule Take 20 mg by mouth Daily.     • linaclotide (LINZESS) 72 MCG capsule capsule Take 72 mcg by mouth Every Morning Before Breakfast.     • montelukast (SINGULAIR) 10 MG tablet Take 10 mg by mouth every night.     • ondansetron ODT (ZOFRAN ODT) 4 MG disintegrating tablet Take 1 tablet by mouth Every 8 (Eight) Hours As Needed for Nausea or Vomiting. 20 tablet 0   • OXcarbazepine (TRILEPTAL) 600 MG tablet Take 600 mg by mouth 2 (Two) Times a Day.     • oxyCODONE (ROXICODONE) 10 MG tablet Take 10 mg by mouth Every 6 (Six) Hours As Needed for Severe Pain .     • pantoprazole (PROTONIX) 40 MG EC tablet Take 40 mg by mouth 2 (Two) Times a Day.     • polyethylene glycol (MIRALAX) packet Take 17 g by mouth Daily.     • sucralfate (CARAFATE) 1 g tablet Take 1 g by mouth 4 (Four) Times a Day.     • traZODone (DESYREL) 50 MG tablet Take 50 mg by mouth Every Night.       No current facility-administered medications for this visit.          Allergies  Zithromax [azithromycin]; Amoxicillin; Codeine; Penicillins; and Phenobarbital    /88   Pulse 70   Temp 97.9 °F (36.6 °C) (Oral)   Resp 18   Ht 177.8 cm (70\")   Wt 103 kg (228 lb)   SpO2 98%   BMI 32.71 kg/m²      Physical Exam  General: 63 y.o. female in no distress    HEENT: PERRL, N&T within normal limits    Chest: Clear bilaterally, equal breast sounds    Heart: Regular rate and rhythm, no murmurs     Abdomen: Soft, non-tender, no masses     and Rectal: Not done    Extremities: Range of motion within normal limits, no gross deformities    Neurologic: " oriented x3, cranial nerves II-XII intact, no sensory, cerebellar, or motor dysfunction     Psychiatric: Mood/Affect normal, normal behavior             ASSESSMENT  Paraesophageal hernia with diarrhea        PLAN    EGD and colonoscopy with collection of stool specimen    Patient's Body mass index is 32.71 kg/m². BMI is above normal parameters. Recommendations include: educational material.           This document signed by Lukas Nelson MD January 29, 2019 3:57 PM

## 2019-01-30 PROBLEM — K44.9 PARAESOPHAGEAL HERNIA: Status: ACTIVE | Noted: 2019-01-30

## 2019-02-06 ENCOUNTER — TELEPHONE (OUTPATIENT)
Dept: SURGERY | Facility: CLINIC | Age: 64
End: 2019-02-06

## 2019-02-08 ENCOUNTER — ANESTHESIA EVENT (OUTPATIENT)
Dept: PERIOP | Facility: HOSPITAL | Age: 64
End: 2019-02-08

## 2019-02-08 ENCOUNTER — HOSPITAL ENCOUNTER (OUTPATIENT)
Facility: HOSPITAL | Age: 64
Setting detail: HOSPITAL OUTPATIENT SURGERY
Discharge: HOME | End: 2019-02-08
Attending: SURGERY | Admitting: SURGERY

## 2019-02-08 ENCOUNTER — ANESTHESIA (OUTPATIENT)
Dept: PERIOP | Facility: HOSPITAL | Age: 64
End: 2019-02-08

## 2019-02-08 VITALS
RESPIRATION RATE: 20 BRPM | TEMPERATURE: 97.2 F | BODY MASS INDEX: 31.78 KG/M2 | SYSTOLIC BLOOD PRESSURE: 176 MMHG | HEART RATE: 62 BPM | OXYGEN SATURATION: 94 % | HEIGHT: 70 IN | WEIGHT: 222 LBS | DIASTOLIC BLOOD PRESSURE: 82 MMHG

## 2019-02-08 DIAGNOSIS — K44.9 PARAESOPHAGEAL HERNIA: ICD-10-CM

## 2019-02-08 DIAGNOSIS — R19.7 DIARRHEA, UNSPECIFIED TYPE: ICD-10-CM

## 2019-02-08 DIAGNOSIS — R11.2 NAUSEA AND VOMITING, INTRACTABILITY OF VOMITING NOT SPECIFIED, UNSPECIFIED VOMITING TYPE: ICD-10-CM

## 2019-02-08 PROCEDURE — 87177 OVA AND PARASITES SMEARS: CPT | Performed by: SURGERY

## 2019-02-08 PROCEDURE — 87046 STOOL CULTR AEROBIC BACT EA: CPT | Performed by: SURGERY

## 2019-02-08 PROCEDURE — 45380 COLONOSCOPY AND BIOPSY: CPT | Performed by: SURGERY

## 2019-02-08 PROCEDURE — 87324 CLOSTRIDIUM AG IA: CPT | Performed by: SURGERY

## 2019-02-08 PROCEDURE — 88305 TISSUE EXAM BY PATHOLOGIST: CPT | Performed by: SURGERY

## 2019-02-08 PROCEDURE — 25010000002 PROPOFOL 10 MG/ML EMULSION: Performed by: NURSE ANESTHETIST, CERTIFIED REGISTERED

## 2019-02-08 PROCEDURE — 87209 SMEAR COMPLEX STAIN: CPT | Performed by: SURGERY

## 2019-02-08 PROCEDURE — 87186 SC STD MICRODIL/AGAR DIL: CPT | Performed by: SURGERY

## 2019-02-08 PROCEDURE — 87045 FECES CULTURE AEROBIC BACT: CPT | Performed by: SURGERY

## 2019-02-08 PROCEDURE — 87899 AGENT NOS ASSAY W/OPTIC: CPT | Performed by: SURGERY

## 2019-02-08 PROCEDURE — 43239 EGD BIOPSY SINGLE/MULTIPLE: CPT | Performed by: SURGERY

## 2019-02-08 PROCEDURE — 25010000002 PROPOFOL 1000 MG/ML EMULSION: Performed by: NURSE ANESTHETIST, CERTIFIED REGISTERED

## 2019-02-08 PROCEDURE — 25010000002 FENTANYL CITRATE (PF) 100 MCG/2ML SOLUTION: Performed by: NURSE ANESTHETIST, CERTIFIED REGISTERED

## 2019-02-08 RX ORDER — IPRATROPIUM BROMIDE AND ALBUTEROL SULFATE 2.5; .5 MG/3ML; MG/3ML
3 SOLUTION RESPIRATORY (INHALATION) ONCE AS NEEDED
Status: DISCONTINUED | OUTPATIENT
Start: 2019-02-08 | End: 2019-02-08 | Stop reason: HOSPADM

## 2019-02-08 RX ORDER — FENTANYL CITRATE 50 UG/ML
INJECTION, SOLUTION INTRAMUSCULAR; INTRAVENOUS AS NEEDED
Status: DISCONTINUED | OUTPATIENT
Start: 2019-02-08 | End: 2019-02-08 | Stop reason: SURG

## 2019-02-08 RX ORDER — PROPOFOL 10 MG/ML
VIAL (ML) INTRAVENOUS AS NEEDED
Status: DISCONTINUED | OUTPATIENT
Start: 2019-02-08 | End: 2019-02-08 | Stop reason: SURG

## 2019-02-08 RX ORDER — LIDOCAINE HYDROCHLORIDE 20 MG/ML
INJECTION, SOLUTION INFILTRATION; PERINEURAL AS NEEDED
Status: DISCONTINUED | OUTPATIENT
Start: 2019-02-08 | End: 2019-02-08 | Stop reason: SURG

## 2019-02-08 RX ORDER — MEPERIDINE HYDROCHLORIDE 25 MG/ML
12.5 INJECTION INTRAMUSCULAR; INTRAVENOUS; SUBCUTANEOUS
Status: DISCONTINUED | OUTPATIENT
Start: 2019-02-08 | End: 2019-02-08 | Stop reason: HOSPADM

## 2019-02-08 RX ORDER — FENTANYL CITRATE 50 UG/ML
50 INJECTION, SOLUTION INTRAMUSCULAR; INTRAVENOUS
Status: DISCONTINUED | OUTPATIENT
Start: 2019-02-08 | End: 2019-02-08 | Stop reason: HOSPADM

## 2019-02-08 RX ORDER — SODIUM CHLORIDE 0.9 % (FLUSH) 0.9 %
3 SYRINGE (ML) INJECTION EVERY 12 HOURS SCHEDULED
Status: DISCONTINUED | OUTPATIENT
Start: 2019-02-08 | End: 2019-02-08 | Stop reason: HOSPADM

## 2019-02-08 RX ORDER — ONDANSETRON 2 MG/ML
4 INJECTION INTRAMUSCULAR; INTRAVENOUS ONCE AS NEEDED
Status: DISCONTINUED | OUTPATIENT
Start: 2019-02-08 | End: 2019-02-08 | Stop reason: HOSPADM

## 2019-02-08 RX ORDER — SODIUM CHLORIDE, SODIUM LACTATE, POTASSIUM CHLORIDE, CALCIUM CHLORIDE 600; 310; 30; 20 MG/100ML; MG/100ML; MG/100ML; MG/100ML
125 INJECTION, SOLUTION INTRAVENOUS CONTINUOUS
Status: DISCONTINUED | OUTPATIENT
Start: 2019-02-08 | End: 2019-02-08 | Stop reason: HOSPADM

## 2019-02-08 RX ORDER — SODIUM CHLORIDE 0.9 % (FLUSH) 0.9 %
3-10 SYRINGE (ML) INJECTION AS NEEDED
Status: DISCONTINUED | OUTPATIENT
Start: 2019-02-08 | End: 2019-02-08 | Stop reason: HOSPADM

## 2019-02-08 RX ADMIN — PROPOFOL 150 MCG/KG/MIN: 10 INJECTION, EMULSION INTRAVENOUS at 08:28

## 2019-02-08 RX ADMIN — FENTANYL CITRATE 100 MCG: 50 INJECTION INTRAMUSCULAR; INTRAVENOUS at 08:28

## 2019-02-08 RX ADMIN — SODIUM CHLORIDE, POTASSIUM CHLORIDE, SODIUM LACTATE AND CALCIUM CHLORIDE: 600; 310; 30; 20 INJECTION, SOLUTION INTRAVENOUS at 08:27

## 2019-02-08 RX ADMIN — PROPOFOL 20 MG: 10 INJECTION, EMULSION INTRAVENOUS at 08:28

## 2019-02-08 RX ADMIN — LIDOCAINE HYDROCHLORIDE 40 MG: 20 INJECTION, SOLUTION INFILTRATION; PERINEURAL at 08:28

## 2019-02-08 NOTE — ANESTHESIA POSTPROCEDURE EVALUATION
Patient: Mary Kay Goldstein    Procedure Summary     Date:  02/08/19 Room / Location:   [facility] [address] [place] [address] [facility]    Anesthesia Start:  0826 Anesthesia Stop:  0857    Procedures:       ESOPHAGOGASTRODUODENOSCOPY (N/A Esophagus)      COLONOSCOPY (N/A ) Diagnosis:       Paraesophageal hernia      Diarrhea, unspecified type      Nausea and vomiting, intractability of vomiting not specified, unspecified vomiting type      (Paraesophageal hernia [K44.9])      (Diarrhea, unspecified type [R19.7])      (Nausea and vomiting, intractability of vomiting not specified, unspecified vomiting type [R11.2])    Surgeon:  Lukas Nelson MD Provider:  Nabil Villar MD    Anesthesia Type:  general ASA Status:  3          Anesthesia Type: general  Last vitals  BP   166/90 (02/08/19 0910)   Temp   97.2 °F (36.2 °C) (02/08/19 0900)   Pulse   57 (02/08/19 0910)   Resp   18 (02/08/19 0910)     SpO2   96 % (02/08/19 0910)     Post Anesthesia Care and Evaluation    Patient location during evaluation: bedside  Patient participation: complete - patient participated  Level of consciousness: awake and alert  Pain score: 1  Pain management: adequate  Airway patency: patent  Anesthetic complications: No anesthetic complications  PONV Status: none  Cardiovascular status: acceptable  Respiratory status: acceptable  Hydration status: acceptable

## 2019-02-08 NOTE — ANESTHESIA PREPROCEDURE EVALUATION
Anesthesia Evaluation     no history of anesthetic complications:  NPO Solid Status: > 8 hours  NPO Liquid Status: > 8 hours           Airway   Mallampati: II  TM distance: >3 FB  Neck ROM: full  No difficulty expected  Dental      Pulmonary    (+) asthma,   Cardiovascular     (+) hypertension, hyperlipidemia,       Neuro/Psych  (+) seizures, numbness,     GI/Hepatic/Renal/Endo    (+)  hiatal hernia, GERD,  diabetes mellitus,     Musculoskeletal     Abdominal    Substance History      OB/GYN          Other        ROS/Med Hx Other: Blind [place]  Hears best on left                Anesthesia Plan    ASA 3     general     intravenous induction   Anesthetic plan, all risks, benefits, and alternatives have been provided, discussed and informed consent has been obtained with: patient.

## 2019-02-08 NOTE — OP NOTE
Mary Kay Goldstein  2/8/2019      Operative Progress Note:    Surgeon and Assistant: Dr. Nelson    Pre-Operative Diagnosis: paraesophageal hernia, nausea vomiting, diarrhea, abdominal pain    Post-Operative Diagnosis: type I paraesophageal hernia, nausea vomiting, diarrhea, abdominal pain, cecal polyp, diverticulosis.  Prolapsing internal hemorrhoids    Procedure(s):  EGD with biopsies and colonoscopy with polypectomy with cold for biopsy forceps    Type of Anesthesia Administered: IV general    Estimated Blood Loss: Minimal    Blood Products: None    Specimen Obtained/Removed: duodenum, antrum, esophageal biopsies.  Cecal polyp measuring 0.5 cm    Complication(s):  None    Graft/Implant/Prosthetics/Implanted Device/Transplants: None    Indication: patient is a white female morbid obesity    Findings: addendum normal, stomach with large type I paraesophageal hernia approximately the size of an orange, esophagus normal.  Colon with scattered diverticulosis and sigmoid area.  0.5 cm polyp in cecum.  Large protruding internal hemorrhoid    Operative Report:  Patient was taken operating room and placed in a left lateral decubitus position.  IV sedation was given per anesthesia.  Gastroscope was introduced and passed into the duodenum.  The scope was slowly withdrawn.  I examined the duodenum, stomach and esophagus thoroughly.  Biopsies were taken as indicated above.  Findings are listed as above.    PPatient was taken to the operating room and placed in a left lateral decubitus position.  IV sedation was given.  Colonoscope was introduced and passed all the way to cecum.  The scope was slowly withdrawn.  Cecum, ascending colon, hepatic flexure, transverse colon, splenic flexure, descending colon, sigmoid colon, and rectum were all examined.  Findings are as listed above.  Digital rectal exam was unremarkable.  The procedures and terminated.  Patient tolerated procedure very well and was returned recovery room in satisfactory  condition    She'll be discharged home at recovery and will be seen back in the office in one week.       Electronically Signed by: Lukas Nelson MD        Dictated Utilizing Dragon Dictation

## 2019-02-09 LAB — C DIFF TOX A+B STL QL IA: NEGATIVE

## 2019-02-11 LAB — BACTERIA SPEC AEROBE CULT: NORMAL

## 2019-02-12 LAB
LAB AP CASE REPORT: NORMAL
O+P SPEC MICRO: NORMAL
OVA + PARASITE RESULT 1: NORMAL
PATH REPORT.FINAL DX SPEC: NORMAL

## 2019-02-19 ENCOUNTER — OFFICE VISIT (OUTPATIENT)
Dept: SURGERY | Facility: CLINIC | Age: 64
End: 2019-02-19

## 2019-02-19 VITALS
OXYGEN SATURATION: 97 % | HEART RATE: 78 BPM | SYSTOLIC BLOOD PRESSURE: 155 MMHG | WEIGHT: 223 LBS | RESPIRATION RATE: 17 BRPM | HEIGHT: 70 IN | TEMPERATURE: 97.9 F | DIASTOLIC BLOOD PRESSURE: 92 MMHG | BODY MASS INDEX: 31.92 KG/M2

## 2019-02-19 DIAGNOSIS — K44.9 PARAESOPHAGEAL HERNIA: Primary | ICD-10-CM

## 2019-02-19 PROCEDURE — 99214 OFFICE O/P EST MOD 30 MIN: CPT | Performed by: SURGERY

## 2019-02-19 NOTE — PROGRESS NOTES
2/19/2019    Patient Information  Mary Kay Goldstein  [address]  [place] [address]  1955  [phone] (home)     Chief Complaint   Patient presents with   • Follow-up     F/U EGD/Colonoscopy        HPI  Patient is a 63-year-old white female who is known to have a paraesophageal hernia.  I performed an EGD and colonoscopy.  I confirmed a paraesophageal hernia.  Also, she has bad reflux.  Colonoscopy was significant for a polyp which was a tubular adenoma.  Patient is      Review of Systems    The Past medical history, family history, social history, medication list, allergies, and Review of Systems has been reviewed and confirmed.      General: negative  Integumentary: negative  Eyes: Blind  ENT: hearing loss  Respiratory: negative  Gastrointestinal: nausea/vomiting, diarrhea and abdominal pain  Cardiovascular: negative  Neurological: dizziness  Psychiatric: anxiety  Hematologic/Lymphatic: negative  Genitourinary: negative  Musculoskeletal: back pain  Endocrine: negative  Breasts: negative      Patient Active Problem List   Diagnosis   • Epilepsy (CMS/HCC)   • Guillain Barré syndrome (CMS/HCC)   • Nausea and vomiting   • Hematochezia   • Diarrhea   • Hiatal hernia   • History of stomach ulcers   • Paraesophageal hernia         Past Medical History:   Diagnosis Date   • Arthritis    • Asthma    • Blind    • Diabetes mellitus (CMS/HCC)    • Elevated cholesterol    • GERD (gastroesophageal reflux disease)    • Hypertension    • Low back pain    • Seizures (CMS/HCC)    • Stomach ulcer          Past Surgical History:   Procedure Laterality Date   • APPENDECTOMY     • BREAST SURGERY      reduction   • CHOLECYSTECTOMY     • COLONOSCOPY      about 7 years ago   • COLONOSCOPY N/A 2/8/2019    Procedure: COLONOSCOPY;  Surgeon: Lukas Nelson MD;  Location: [facility] OR;  Service: Gastroenterology   • ENDOSCOPY N/A 2/8/2019    Procedure: ESOPHAGOGASTRODUODENOSCOPY;  Surgeon: Lukas Nelson MD;  Location: [facility] OR;  Service:  Gastroenterology   • EYE SURGERY     • HYSTERECTOMY           Family History   Problem Relation Age of Onset   • Cancer Mother    • Diabetes Mother    • Hypertension Mother    • Hypertension Father    • Heart disease Father          Social History     Tobacco Use   • Smoking status: Never Smoker   • Smokeless tobacco: Never Used   Substance Use Topics   • Alcohol use: No   • Drug use: No       Current Outpatient Medications   Medication Sig Dispense Refill   • albuterol (PROVENTIL HFA;VENTOLIN HFA) 108 (90 BASE) MCG/ACT inhaler Inhale 2 puffs Every 4 (Four) Hours As Needed for Wheezing or Shortness of Air.     • amLODIPine (NORVASC) 5 MG tablet Take 5 mg by mouth Daily.     • cetirizine (zyrTEC) 10 MG tablet Take 10 mg by mouth Every Night.     • chlorthalidone (HYGROTON) 25 MG tablet Take 25 mg by mouth Daily.     • FLUoxetine (PROzac) 20 MG capsule Take 20 mg by mouth Daily.     • linaclotide (LINZESS) 72 MCG capsule capsule Take 72 mcg by mouth Every Morning Before Breakfast.     • montelukast (SINGULAIR) 10 MG tablet Take 10 mg by mouth every night.     • ondansetron ODT (ZOFRAN ODT) 4 MG disintegrating tablet Take 1 tablet by mouth Every 8 (Eight) Hours As Needed for Nausea or Vomiting. 20 tablet 0   • OXcarbazepine (TRILEPTAL) 600 MG tablet Take 600 mg by mouth 2 (Two) Times a Day.     • oxyCODONE (ROXICODONE) 10 MG tablet Take 10 mg by mouth Every 6 (Six) Hours As Needed for Severe Pain .     • pantoprazole (PROTONIX) 40 MG EC tablet Take 40 mg by mouth 2 (Two) Times a Day.     • polyethylene glycol (MIRALAX) packet Take 17 g by mouth Daily.     • SITagliptin (JANUVIA) 50 MG tablet Take 50 mg by mouth Daily.     • sucralfate (CARAFATE) 1 g tablet Take 1 g by mouth 4 (Four) Times a Day.     • traZODone (DESYREL) 50 MG tablet Take 50 mg by mouth Every Night.       No current facility-administered medications for this visit.          Allergies  Zithromax [azithromycin]; Amoxicillin; Codeine; Penicillins; and  "Phenobarbital    /92   Pulse 78   Temp 97.9 °F (36.6 °C) (Oral)   Resp 17   Ht 177.8 cm (70\")   Wt 101 kg (223 lb)   SpO2 97%   BMI 32.00 kg/m²      Physical Exam    General :  Patient is a 63 y.o. female in no acute distress.  Patient is blind with dark glasses in place    HEENT:  Normocephalic, pupils equally round and reactive to light, extraocular motions intact.  Patient is blind  Chest:  Clear bilaterally. Equal breath sounds. No rales or rhonchi.  Heart:   Regular rate and rhythm.  Abdomen:  Soft, nontender. No distention.  :  Normal external genitalia.  Rectal:  Not performed.  Extremities:  No clubbing cyanosis or edema. Good femoral, popliteal, dorsalis pedis and posterior tibial pulse.  Neurologic:  Patient oriented ×3. Cranial nerves grossly intact. No sensory,cellebellar, or motor dysfunction.                ASSESSMENT  Paraesophageal hernia        PLAN    Laparoscopic repair of paraesophageal hernia with placement of mesh and Nissen fundoplication.  Prior to this procedure will need to get an EMS and barium swallow.  PH study is not necessary.    Risks, benefits, and possible complications discussed with patient and .  I have informed them that we would put mesh in.  Booklet which discusses the procedure and complications has been given to patient.    Patient's Body mass index is 32 kg/m². BMI is above normal parameters. Recommendations include: educational material.           This document signed by Lukas Nelson MD February 19, 2019 4:08 PM   "

## 2019-02-22 ENCOUNTER — TELEPHONE (OUTPATIENT)
Dept: SURGERY | Facility: CLINIC | Age: 64
End: 2019-02-22

## 2019-02-25 ENCOUNTER — HOSPITAL ENCOUNTER (OUTPATIENT)
Dept: PREOP | Facility: HOSPITAL | Age: 64
Setting detail: HOSPITAL OUTPATIENT SURGERY
Discharge: HOME | End: 2019-02-25
Admitting: SURGERY

## 2019-02-25 VITALS
SYSTOLIC BLOOD PRESSURE: 194 MMHG | HEART RATE: 90 BPM | TEMPERATURE: 98 F | DIASTOLIC BLOOD PRESSURE: 95 MMHG | OXYGEN SATURATION: 96 % | RESPIRATION RATE: 18 BRPM

## 2019-02-25 DIAGNOSIS — K44.9 PARAESOPHAGEAL HERNIA: ICD-10-CM

## 2019-02-25 PROCEDURE — 91010 ESOPHAGUS MOTILITY STUDY: CPT

## 2019-02-25 RX ADMIN — LIDOCAINE HYDROCHLORIDE 3 ML: 20 SOLUTION ORAL; TOPICAL at 08:39

## 2019-02-25 NOTE — NURSING NOTE
Pt here today for Esophageal Motility Study. Pt arrived via wheelchair with son. Consent for procedure obtained. BP in preop was elevated but pt states she was nervous and did not take her morning BP meds.  3mL of viscous lidocaine was inserted into left nare.  Pt tolerated wihtout any complications.  EMS probe was placed to 50cm.  Procedure was started at 839 and completed at 856.  Pt tolerated procedure without any complications.  Post BP was elevated at same level as pre.  Pt and son advised to take BP when they get home.  They verbalized understanding.  Pt has other test scheduled prior to following up with Dr Nelson.  Pt left via wheelchair with son with no signs/symptoms of distress.

## 2019-02-26 ENCOUNTER — HOSPITAL ENCOUNTER (OUTPATIENT)
Dept: GENERAL RADIOLOGY | Facility: HOSPITAL | Age: 64
Discharge: HOME | End: 2019-02-26
Admitting: SURGERY

## 2019-02-26 ENCOUNTER — HOSPITAL ENCOUNTER (OUTPATIENT)
Dept: GENERAL RADIOLOGY | Facility: HOSPITAL | Age: 64
Discharge: HOME | End: 2019-02-26

## 2019-02-26 DIAGNOSIS — K44.9 PARAESOPHAGEAL HERNIA: ICD-10-CM

## 2019-02-26 PROCEDURE — 74247: CPT

## 2019-02-26 PROCEDURE — 74220 X-RAY XM ESOPHAGUS 1CNTRST: CPT

## 2019-02-26 PROCEDURE — 74246 X-RAY XM UPR GI TRC 2CNTRST: CPT

## 2019-02-26 PROCEDURE — 74246 X-RAY XM UPR GI TRC 2CNTRST: CPT | Performed by: RADIOLOGY

## 2019-03-08 ENCOUNTER — TELEPHONE (OUTPATIENT)
Dept: SURGERY | Facility: CLINIC | Age: 64
End: 2019-03-08

## 2019-03-08 NOTE — TELEPHONE ENCOUNTER
I have tried to reach patient to schedule a follow up appt since 02/27/19. I have left several messages on her sister and sons phone but have not received a call back. I will mail patient appointment.

## 2019-03-19 ENCOUNTER — OFFICE VISIT (OUTPATIENT)
Dept: SURGERY | Facility: CLINIC | Age: 64
End: 2019-03-19

## 2019-03-19 ENCOUNTER — OFFICE VISIT (OUTPATIENT)
Dept: GASTROENTEROLOGY | Facility: CLINIC | Age: 64
End: 2019-03-19

## 2019-03-19 VITALS
SYSTOLIC BLOOD PRESSURE: 155 MMHG | WEIGHT: 221 LBS | HEART RATE: 78 BPM | DIASTOLIC BLOOD PRESSURE: 88 MMHG | OXYGEN SATURATION: 99 % | TEMPERATURE: 98.3 F | HEIGHT: 70 IN | RESPIRATION RATE: 17 BRPM | BODY MASS INDEX: 31.64 KG/M2

## 2019-03-19 VITALS
WEIGHT: 223 LBS | BODY MASS INDEX: 31.92 KG/M2 | DIASTOLIC BLOOD PRESSURE: 89 MMHG | HEART RATE: 101 BPM | HEIGHT: 70 IN | SYSTOLIC BLOOD PRESSURE: 165 MMHG | OXYGEN SATURATION: 95 %

## 2019-03-19 DIAGNOSIS — K21.9 GASTROESOPHAGEAL REFLUX DISEASE, ESOPHAGITIS PRESENCE NOT SPECIFIED: ICD-10-CM

## 2019-03-19 DIAGNOSIS — R10.13 EPIGASTRIC PAIN: Primary | ICD-10-CM

## 2019-03-19 DIAGNOSIS — K92.1 HEMATOCHEZIA: ICD-10-CM

## 2019-03-19 DIAGNOSIS — K44.9 PARAESOPHAGEAL HERNIA: ICD-10-CM

## 2019-03-19 DIAGNOSIS — K44.9 PARAESOPHAGEAL HERNIA: Primary | ICD-10-CM

## 2019-03-19 DIAGNOSIS — K64.8 INTERNAL HEMORRHOIDS: ICD-10-CM

## 2019-03-19 PROCEDURE — 99214 OFFICE O/P EST MOD 30 MIN: CPT | Performed by: SURGERY

## 2019-03-19 PROCEDURE — 99212 OFFICE O/P EST SF 10 MIN: CPT | Performed by: PHYSICIAN ASSISTANT

## 2019-03-19 NOTE — PROGRESS NOTES
3/19/2019    Patient Information  Mary Kay Goldstein  [address]  [place] [address]  1955  [phone] (home)     Chief Complaint   Patient presents with   • Follow-up     F/U EMS,BARIUM SWALLOW,UGI       HPI  Patient is a 63-year-old white female who has a very large paraesophageal hernia.  Quite symptomatic from this and desires surgical intervention.  Her EMS is normal.  Barium swallow shows a third of her stomach and her chest.    Review of Systems    The Past medical history, family history, social history, medication list, allergies, and Review of Systems has been reviewed and confirmed.      General: negative  Integumentary: negative  Eyes: Blind  ENT: hearing loss  Respiratory: negative  Gastrointestinal: nausea/vomiting, diarrhea and abdominal pain  Cardiovascular: negative  Neurological: dizziness  Psychiatric: anxiety  Hematologic/Lymphatic: negative  Genitourinary: negative  Musculoskeletal: back pain  Endocrine: negative  Breasts: negative      Patient Active Problem List   Diagnosis   • Epilepsy (CMS/HCC)   • Guillain Barré syndrome (CMS/HCC)   • Nausea and vomiting   • Hematochezia   • Diarrhea   • Hiatal hernia   • History of stomach ulcers   • Paraesophageal hernia   • Epigastric pain   • Internal hemorrhoids   • Gastroesophageal reflux disease         Past Medical History:   Diagnosis Date   • Arthritis    • Asthma    • Blind    • Diabetes mellitus (CMS/HCC)    • Elevated cholesterol    • GERD (gastroesophageal reflux disease)    • Hypertension    • Low back pain    • Seizures (CMS/HCC)    • Stomach ulcer          Past Surgical History:   Procedure Laterality Date   • APPENDECTOMY     • BREAST SURGERY      reduction   • CHOLECYSTECTOMY     • COLONOSCOPY      about 7 years ago   • COLONOSCOPY N/A 2/8/2019    Procedure: COLONOSCOPY;  Surgeon: Lukas Nelson MD;  Location[facility];  Service: Gastroenterology   • ENDOSCOPY N/A 2/8/2019    Procedure: ESOPHAGOGASTRODUODENOSCOPY;  Surgeon: Omar  Lukas ALCAZAR MD;  Location: [facility];  Service: Gastroenterology   • EYE SURGERY     • HYSTERECTOMY           Family History   Problem Relation Age of Onset   • Cancer Mother    • Diabetes Mother    • Hypertension Mother    • Hypertension Father    • Heart disease Father          Social History     Tobacco Use   • Smoking status: Never Smoker   • Smokeless tobacco: Never Used   Substance Use Topics   • Alcohol use: No   • Drug use: No       Current Outpatient Medications   Medication Sig Dispense Refill   • albuterol (PROVENTIL HFA;VENTOLIN HFA) 108 (90 BASE) MCG/ACT inhaler Inhale 2 puffs Every 4 (Four) Hours As Needed for Wheezing or Shortness of Air.     • amLODIPine (NORVASC) 5 MG tablet Take 5 mg by mouth Daily.     • cetirizine (zyrTEC) 10 MG tablet Take 10 mg by mouth Every Night.     • chlorthalidone (HYGROTON) 25 MG tablet Take 25 mg by mouth Daily.     • FLUoxetine (PROzac) 20 MG capsule Take 20 mg by mouth Daily.     • linaclotide (LINZESS) 72 MCG capsule capsule Take 72 mcg by mouth Every Morning Before Breakfast.     • montelukast (SINGULAIR) 10 MG tablet Take 10 mg by mouth every night.     • ondansetron ODT (ZOFRAN ODT) 4 MG disintegrating tablet Take 1 tablet by mouth Every 8 (Eight) Hours As Needed for Nausea or Vomiting. 20 tablet 0   • OXcarbazepine (TRILEPTAL) 600 MG tablet Take 600 mg by mouth 2 (Two) Times a Day.     • oxyCODONE (ROXICODONE) 10 MG tablet Take 10 mg by mouth Every 6 (Six) Hours As Needed for Severe Pain .     • pantoprazole (PROTONIX) 40 MG EC tablet Take 40 mg by mouth 2 (Two) Times a Day.     • polyethylene glycol (MIRALAX) packet Take 17 g by mouth Daily.     • SITagliptin (JANUVIA) 50 MG tablet Take 50 mg by mouth Daily.     • sucralfate (CARAFATE) 1 g tablet Take 1 g by mouth 4 (Four) Times a Day.     • traZODone (DESYREL) 50 MG tablet Take 50 mg by mouth Every Night.       No current facility-administered medications for this visit.          Allergies  Zithromax  "[azithromycin]; Amoxicillin; Codeine; Penicillins; and Phenobarbital    /88   Pulse 78   Temp 98.3 °F (36.8 °C) (Oral)   Resp 17   Ht 177.8 cm (70\")   Wt 100 kg (221 lb)   SpO2 99%   BMI 31.71 kg/m²      Physical Exam    General :  Patient is a 63 y.o. female in no acute distress.    HEENT:  Normocephalic, pupils equally round and reactive to light, extraocular motions intact.  Chest:  Clear bilaterally. Equal breath sounds. No rales or rhonchi.  Heart:   Regular rate and rhythm.  Abdomen:  Soft, nontender. No distention.  Lower midline surgical scar from previous hysterectomy  :  Normal external genitalia.  Rectal:  Not performed.  Extremities:  No clubbing cyanosis or edema. Good femoral, popliteal, dorsalis pedis and posterior tibial pulse.  Neurologic:  Patient oriented ×3. Cranial nerves grossly intact. No sensory,cellebellar, or motor dysfunction.                ASSESSMENT  Paraesophageal hernia        PLAN    Laparoscopic repair of paraesophageal hernia with patch and Nissen fundoplication.    Risks, benefits, and possible complications discussed in great detail with the patient and son.  Both of which she explains the operation has been given to the patient.  List of complications have been read over with the patient and son.  They understand and agreed to the outlined plan.  Also discussed possibility of pneumothorax.    Patient's Body mass index is 31.71 kg/m². BMI is above normal parameters. Recommendations include: educational material.           This document signed by Lukas Nelson MD March 19, 2019 3:32 PM   "

## 2019-03-20 RX ORDER — LEVOFLOXACIN 5 MG/ML
500 INJECTION, SOLUTION INTRAVENOUS ONCE
Status: CANCELLED | OUTPATIENT
Start: 2019-04-29 | End: 2019-03-20

## 2019-03-25 ENCOUNTER — TELEPHONE (OUTPATIENT)
Dept: SURGERY | Facility: CLINIC | Age: 64
End: 2019-03-25

## 2019-04-22 ENCOUNTER — APPOINTMENT (OUTPATIENT)
Dept: PREADMISSION TESTING | Facility: HOSPITAL | Age: 64
End: 2019-04-22

## 2019-04-22 DIAGNOSIS — K44.9 PARAESOPHAGEAL HERNIA: ICD-10-CM

## 2019-04-22 DIAGNOSIS — R19.7 DIARRHEA, UNSPECIFIED TYPE: ICD-10-CM

## 2019-04-22 DIAGNOSIS — R11.2 NAUSEA AND VOMITING, INTRACTABILITY OF VOMITING NOT SPECIFIED, UNSPECIFIED VOMITING TYPE: ICD-10-CM

## 2019-04-22 LAB
ALBUMIN SERPL-MCNC: 3.82 G/DL (ref 3.5–5.2)
ALBUMIN/GLOB SERPL: 1 G/DL
ALP SERPL-CCNC: 169 U/L (ref 39–117)
ALT SERPL W P-5'-P-CCNC: 13 U/L (ref 1–33)
ANION GAP SERPL CALCULATED.3IONS-SCNC: 12.5 MMOL/L
AST SERPL-CCNC: 14 U/L (ref 1–32)
BASOPHILS # BLD AUTO: 0.02 10*3/MM3 (ref 0–0.2)
BASOPHILS NFR BLD AUTO: 0.3 % (ref 0–1.5)
BILIRUB SERPL-MCNC: 0.3 MG/DL (ref 0.2–1.2)
BUN BLD-MCNC: 6 MG/DL (ref 8–23)
BUN/CREAT SERPL: 7.8 (ref 7–25)
CALCIUM SPEC-SCNC: 9.3 MG/DL (ref 8.6–10.5)
CHLORIDE SERPL-SCNC: 76 MMOL/L (ref 98–107)
CO2 SERPL-SCNC: 33.5 MMOL/L (ref 22–29)
CREAT BLD-MCNC: 0.77 MG/DL (ref 0.57–1)
DEPRECATED RDW RBC AUTO: 39.2 FL (ref 37–54)
EOSINOPHIL # BLD AUTO: 0 10*3/MM3 (ref 0–0.4)
EOSINOPHIL NFR BLD AUTO: 0 % (ref 0.3–6.2)
ERYTHROCYTE [DISTWIDTH] IN BLOOD BY AUTOMATED COUNT: 14.3 % (ref 12.3–15.4)
GFR SERPL CREATININE-BSD FRML MDRD: 76 ML/MIN/1.73
GLOBULIN UR ELPH-MCNC: 3.8 GM/DL
GLUCOSE BLD-MCNC: 214 MG/DL (ref 65–99)
HCT VFR BLD AUTO: 34.6 % (ref 34–46.6)
HGB BLD-MCNC: 11.6 G/DL (ref 12–15.9)
IMM GRANULOCYTES # BLD AUTO: 0.01 10*3/MM3 (ref 0–0.05)
IMM GRANULOCYTES NFR BLD AUTO: 0.1 % (ref 0–0.5)
LYMPHOCYTES # BLD AUTO: 1.04 10*3/MM3 (ref 0.7–3.1)
LYMPHOCYTES NFR BLD AUTO: 14.9 % (ref 19.6–45.3)
MCH RBC QN AUTO: 26.2 PG (ref 26.6–33)
MCHC RBC AUTO-ENTMCNC: 33.5 G/DL (ref 31.5–35.7)
MCV RBC AUTO: 78.1 FL (ref 79–97)
MONOCYTES # BLD AUTO: 0.79 10*3/MM3 (ref 0.1–0.9)
MONOCYTES NFR BLD AUTO: 11.3 % (ref 5–12)
NEUTROPHILS # BLD AUTO: 5.14 10*3/MM3 (ref 1.7–7)
NEUTROPHILS NFR BLD AUTO: 73.4 % (ref 42.7–76)
PLATELET # BLD AUTO: 357 10*3/MM3 (ref 140–450)
PMV BLD AUTO: 9.3 FL (ref 6–12)
POTASSIUM BLD-SCNC: 3.4 MMOL/L (ref 3.5–5.2)
PROT SERPL-MCNC: 7.6 G/DL (ref 6–8.5)
RBC # BLD AUTO: 4.43 10*6/MM3 (ref 3.77–5.28)
SODIUM BLD-SCNC: 122 MMOL/L (ref 136–145)
WBC NRBC COR # BLD: 7 10*3/MM3 (ref 3.4–10.8)

## 2019-04-22 PROCEDURE — 85025 COMPLETE CBC W/AUTO DIFF WBC: CPT | Performed by: SURGERY

## 2019-04-22 PROCEDURE — 36415 COLL VENOUS BLD VENIPUNCTURE: CPT

## 2019-04-22 PROCEDURE — 80053 COMPREHEN METABOLIC PANEL: CPT | Performed by: SURGERY

## 2019-04-22 RX ORDER — BUSPIRONE HYDROCHLORIDE 5 MG/1
5 TABLET ORAL 2 TIMES DAILY
COMMUNITY

## 2019-04-22 RX ORDER — GABAPENTIN 600 MG/1
600 TABLET ORAL 3 TIMES DAILY
COMMUNITY

## 2019-04-22 RX ORDER — LISINOPRIL 20 MG/1
20 TABLET ORAL DAILY
Status: ON HOLD | COMMUNITY
End: 2019-04-29

## 2019-04-22 RX ORDER — TRAZODONE HYDROCHLORIDE 100 MG/1
100 TABLET ORAL NIGHTLY
COMMUNITY

## 2019-04-22 NOTE — DISCHARGE INSTRUCTIONS
4/29/2019  AT  0600    TAKE the following medications the morning of surgery:  All heart or blood pressure medications    HOLD all diabetic medications the morning of surgery as ordered by physician.    Please discontinue all blood thinners and anticoagulants (except aspirin) prior to surgery as per your surgeon and cardiologist instructions.  Aspirin may be continued up to the day prior to surgery.     CHLORHEXIDINE CLOTHS GIVEN WITH INSTRUCTIONS AND FORM TO RETURN TO HOSPITAL    General Instructions:  · Do not eat or drink after midnight: includes water, mints, or gum. You may brush your teeth.  Dental appliances that are removable must be taken out day of surgery.  · Do not smoke, chew tobacco, or drink alcohol.  · Bring medications in original bottles, any inhalers and if applicable your C-PAP/BI-PAP machine.  · Bring any papers given to you in the doctor's office.  · Wear clean comfortable clothes and socks.  · Do not wear contact lenses or make-up. Bring a case for your glasses if applicable.  · Bring crutches or walker if applicable.  · Leave all other valuables and jewelry at home.    If you were given a blood bank ID arm band remember to bring it with you the day of surgery.    Preventing a Surgical Site Infection:  Shower the night before surgery (unless instructed other wise) using a fresh bar of anti-bacterial soap (such as Dial) and clean washcloth. Dry with a clean towel and dress in clean clothing.  For 2 to 3 days before surgery, avoid shaving with a razor near where you will have surgery because the razor can irritate skin and make it easier to develop an infection. Ask your surgeon if you will be receiving antibiotics prior to surgery.  Make sure you, your family, and all healthcare providers clear their hands with soap and water or an alcohol-based hand  before caring for you or your wound.  If at all possible, quit smoking as many days before surgery as you can.    Day of surgery:  Upon  arrival, a Pre-op nurse and Anesthesiologist will review your health history, obtain vital signs, and answer questions you may have. The only belongings needed at this time will be your home medications and if applicable your C-PAP/BI-PAP machine. If you are staying overnight your family can leave the rest of your belongings in the car and bring them to your room later. A Pre-op nurse will start an IV and you may receive medication in preparation for surgery, including something to help you relax. Your family will be able to see you in the Pre-op area. While you are in surgery your family should notify the waiting room  if they leave the waiting room area and provide a contact phone number.    Please be aware that surgery does come with discomfort. We want to make every effort to control your discomfort so please discuss any uncontrolled symptoms with your nurse. Your doctor will most likely have prescribed pain medications.    If you are going home after surgery you will receive individualized written care instructions before being discharged. A responsible adult must drive you to and from the hospital on the day of surgery and stay with you for 24 hours.    If you are staying overnight following surgery, you will be transported to your hospital room following the recovery period.  [facility] has all private rooms.    If you have any questions please call Pre-Admission Testing at [phone].  Deductibles and co-payments are collected on the day of service. Please be prepared to pay the required co-pay, deductible or deposit on the day of service as defined by your plan.  TAKE the following medications the morning of surgery:  All heart or blood pressure medications    HOLD all diabetic medications the morning of surgery as ordered by physician.    Please discontinue all blood thinners and anticoagulants (except aspirin) prior to surgery as per your surgeon and cardiologist instructions.   Aspirin may be continued up to the day prior to surgery.     CHLORHEXIDINE CLOTHS GIVEN WITH INSTRUCTIONS AND FORM TO RETURN TO HOSPITAL    General Instructions:  · Do not eat or drink after midnight: includes water, mints, or gum. You may brush your teeth.  Dental appliances that are removable must be taken out day of surgery.  · Do not smoke, chew tobacco, or drink alcohol.  · Bring medications in original bottles, any inhalers and if applicable your C-PAP/BI-PAP machine.  · Bring any papers given to you in the doctor's office.  · Wear clean comfortable clothes and socks.  · Do not wear contact lenses or make-up. Bring a case for your glasses if applicable.  · Bring crutches or walker if applicable.  · Leave all other valuables and jewelry at home.    If you were given a blood bank ID arm band remember to bring it with you the day of surgery.    Preventing a Surgical Site Infection:  Shower the night before surgery (unless instructed other wise) using a fresh bar of anti-bacterial soap (such as Dial) and clean washcloth. Dry with a clean towel and dress in clean clothing.  For 2 to 3 days before surgery, avoid shaving with a razor near where you will have surgery because the razor can irritate skin and make it easier to develop an infection. Ask your surgeon if you will be receiving antibiotics prior to surgery.  Make sure you, your family, and all healthcare providers clear their hands with soap and water or an alcohol-based hand  before caring for you or your wound.  If at all possible, quit smoking as many days before surgery as you can.    Day of surgery:  Upon arrival, a Pre-op nurse and Anesthesiologist will review your health history, obtain vital signs, and answer questions you may have. The only belongings needed at this time will be your home medications and if applicable your C-PAP/BI-PAP machine. If you are staying overnight your family can leave the rest of your belongings in the car and  bring them to your room later. A Pre-op nurse will start an IV and you may receive medication in preparation for surgery, including something to help you relax. Your family will be able to see you in the Pre-op area. While you are in surgery your family should notify the waiting room  if they leave the waiting room area and provide a contact phone number.    Please be aware that surgery does come with discomfort. We want to make every effort to control your discomfort so please discuss any uncontrolled symptoms with your nurse. Your doctor will most likely have prescribed pain medications.    If you are going home after surgery you will receive individualized written care instructions before being discharged. A responsible adult must drive you to and from the hospital on the day of surgery and stay with you for 24 hours.    If you are staying overnight following surgery, you will be transported to your hospital room following the recovery period.  [facility] has all private rooms.    If you have any questions please call Pre-Admission Testing at [phone].  Deductibles and co-payments are collected on the day of service. Please be prepared to pay the required co-pay, deductible or deposit on the day of service as defined by your plan.

## 2019-04-29 ENCOUNTER — HOSPITAL ENCOUNTER (INPATIENT)
Facility: HOSPITAL | Age: 64
LOS: 3 days | Discharge: HOME HEALTH | End: 2019-05-02
Attending: SURGERY | Admitting: SURGERY

## 2019-04-29 ENCOUNTER — ANESTHESIA EVENT (OUTPATIENT)
Dept: PERIOP | Facility: HOSPITAL | Age: 64
End: 2019-04-29

## 2019-04-29 ENCOUNTER — APPOINTMENT (OUTPATIENT)
Dept: GENERAL RADIOLOGY | Facility: HOSPITAL | Age: 64
End: 2019-04-29

## 2019-04-29 ENCOUNTER — ANESTHESIA (OUTPATIENT)
Dept: PERIOP | Facility: HOSPITAL | Age: 64
End: 2019-04-29

## 2019-04-29 DIAGNOSIS — K21.9 GASTROESOPHAGEAL REFLUX DISEASE, ESOPHAGITIS PRESENCE NOT SPECIFIED: ICD-10-CM

## 2019-04-29 LAB — GLUCOSE BLDC GLUCOMTR-MCNC: 194 MG/DL (ref 70–130)

## 2019-04-29 PROCEDURE — 25010000002 NEOSTIGMINE 10 MG/10ML SOLUTION: Performed by: NURSE ANESTHETIST, CERTIFIED REGISTERED

## 2019-04-29 PROCEDURE — 25010000002 MORPHINE PER 10 MG: Performed by: SURGERY

## 2019-04-29 PROCEDURE — 82962 GLUCOSE BLOOD TEST: CPT

## 2019-04-29 PROCEDURE — 25010000002 LEVOFLOXACIN PER 250 MG: Performed by: SURGERY

## 2019-04-29 PROCEDURE — 25010000002 MIDAZOLAM PER 1 MG: Performed by: NURSE ANESTHETIST, CERTIFIED REGISTERED

## 2019-04-29 PROCEDURE — 0DQN4ZZ REPAIR SIGMOID COLON, PERCUTANEOUS ENDOSCOPIC APPROACH: ICD-10-PCS | Performed by: SURGERY

## 2019-04-29 PROCEDURE — 94799 UNLISTED PULMONARY SVC/PX: CPT

## 2019-04-29 PROCEDURE — 25010000002 PROPOFOL 10 MG/ML EMULSION: Performed by: NURSE ANESTHETIST, CERTIFIED REGISTERED

## 2019-04-29 PROCEDURE — 0DNE4ZZ RELEASE LARGE INTESTINE, PERCUTANEOUS ENDOSCOPIC APPROACH: ICD-10-PCS | Performed by: SURGERY

## 2019-04-29 PROCEDURE — 25010000002 FENTANYL CITRATE (PF) 100 MCG/2ML SOLUTION: Performed by: NURSE ANESTHETIST, CERTIFIED REGISTERED

## 2019-04-29 PROCEDURE — 25010000002 ONDANSETRON PER 1 MG: Performed by: NURSE ANESTHETIST, CERTIFIED REGISTERED

## 2019-04-29 PROCEDURE — 44180 LAP ENTEROLYSIS: CPT | Performed by: SURGERY

## 2019-04-29 RX ORDER — OXCARBAZEPINE 300 MG/1
600 TABLET, FILM COATED ORAL EVERY 12 HOURS SCHEDULED
Status: CANCELLED | OUTPATIENT
Start: 2019-04-29

## 2019-04-29 RX ORDER — AMLODIPINE BESYLATE 5 MG/1
5 TABLET ORAL DAILY
Status: DISCONTINUED | OUTPATIENT
Start: 2019-04-29 | End: 2019-05-02 | Stop reason: HOSPADM

## 2019-04-29 RX ORDER — MIDAZOLAM HYDROCHLORIDE 1 MG/ML
INJECTION INTRAMUSCULAR; INTRAVENOUS AS NEEDED
Status: DISCONTINUED | OUTPATIENT
Start: 2019-04-29 | End: 2019-04-29 | Stop reason: SURG

## 2019-04-29 RX ORDER — IPRATROPIUM BROMIDE AND ALBUTEROL SULFATE 2.5; .5 MG/3ML; MG/3ML
3 SOLUTION RESPIRATORY (INHALATION) ONCE AS NEEDED
Status: DISCONTINUED | OUTPATIENT
Start: 2019-04-29 | End: 2019-04-29 | Stop reason: HOSPADM

## 2019-04-29 RX ORDER — MAGNESIUM HYDROXIDE 1200 MG/15ML
LIQUID ORAL AS NEEDED
Status: DISCONTINUED | OUTPATIENT
Start: 2019-04-29 | End: 2019-04-29 | Stop reason: HOSPADM

## 2019-04-29 RX ORDER — ALBUTEROL SULFATE 2.5 MG/3ML
2.5 SOLUTION RESPIRATORY (INHALATION) EVERY 4 HOURS PRN
Status: CANCELLED | OUTPATIENT
Start: 2019-04-29

## 2019-04-29 RX ORDER — BUSPIRONE HYDROCHLORIDE 5 MG/1
5 TABLET ORAL 2 TIMES DAILY
Status: DISCONTINUED | OUTPATIENT
Start: 2019-04-29 | End: 2019-05-02 | Stop reason: HOSPADM

## 2019-04-29 RX ORDER — ONDANSETRON 2 MG/ML
4 INJECTION INTRAMUSCULAR; INTRAVENOUS ONCE AS NEEDED
Status: DISCONTINUED | OUTPATIENT
Start: 2019-04-29 | End: 2019-04-29 | Stop reason: HOSPADM

## 2019-04-29 RX ORDER — MONTELUKAST SODIUM 10 MG/1
10 TABLET ORAL NIGHTLY
Status: DISCONTINUED | OUTPATIENT
Start: 2019-04-29 | End: 2019-05-02 | Stop reason: HOSPADM

## 2019-04-29 RX ORDER — AMLODIPINE BESYLATE 5 MG/1
5 TABLET ORAL DAILY
Status: CANCELLED | OUTPATIENT
Start: 2019-04-29

## 2019-04-29 RX ORDER — ONDANSETRON 2 MG/ML
4 INJECTION INTRAMUSCULAR; INTRAVENOUS EVERY 6 HOURS PRN
Status: DISCONTINUED | OUTPATIENT
Start: 2019-04-29 | End: 2019-05-02 | Stop reason: HOSPADM

## 2019-04-29 RX ORDER — PANTOPRAZOLE SODIUM 40 MG/1
40 TABLET, DELAYED RELEASE ORAL 2 TIMES DAILY
Status: CANCELLED | OUTPATIENT
Start: 2019-04-29

## 2019-04-29 RX ORDER — POLYETHYLENE GLYCOL 3350 17 G/17G
17 POWDER, FOR SOLUTION ORAL DAILY
Status: CANCELLED | OUTPATIENT
Start: 2019-04-29

## 2019-04-29 RX ORDER — SODIUM CHLORIDE 0.9 % (FLUSH) 0.9 %
3 SYRINGE (ML) INJECTION EVERY 12 HOURS SCHEDULED
Status: DISCONTINUED | OUTPATIENT
Start: 2019-04-29 | End: 2019-04-29 | Stop reason: HOSPADM

## 2019-04-29 RX ORDER — BUSPIRONE HYDROCHLORIDE 5 MG/1
5 TABLET ORAL 2 TIMES DAILY
Status: CANCELLED | OUTPATIENT
Start: 2019-04-29

## 2019-04-29 RX ORDER — CHLORTHALIDONE 25 MG/1
25 TABLET ORAL DAILY
COMMUNITY

## 2019-04-29 RX ORDER — NEOSTIGMINE METHYLSULFATE 1 MG/ML
INJECTION, SOLUTION INTRAVENOUS AS NEEDED
Status: DISCONTINUED | OUTPATIENT
Start: 2019-04-29 | End: 2019-04-29 | Stop reason: SURG

## 2019-04-29 RX ORDER — GABAPENTIN 300 MG/1
600 CAPSULE ORAL EVERY 8 HOURS SCHEDULED
Status: DISCONTINUED | OUTPATIENT
Start: 2019-04-29 | End: 2019-05-01

## 2019-04-29 RX ORDER — POLYETHYLENE GLYCOL 3350 17 G/17G
17 POWDER, FOR SOLUTION ORAL DAILY
Status: DISCONTINUED | OUTPATIENT
Start: 2019-04-29 | End: 2019-05-02 | Stop reason: HOSPADM

## 2019-04-29 RX ORDER — LEVOFLOXACIN 5 MG/ML
500 INJECTION, SOLUTION INTRAVENOUS EVERY 24 HOURS
Status: DISPENSED | OUTPATIENT
Start: 2019-04-29 | End: 2019-04-30

## 2019-04-29 RX ORDER — SODIUM CHLORIDE 9 MG/ML
100 INJECTION, SOLUTION INTRAVENOUS CONTINUOUS
Status: DISCONTINUED | OUTPATIENT
Start: 2019-04-29 | End: 2019-05-02 | Stop reason: HOSPADM

## 2019-04-29 RX ORDER — MORPHINE SULFATE 8 MG/ML
6 INJECTION INTRAMUSCULAR; INTRAVENOUS; SUBCUTANEOUS
Status: DISCONTINUED | OUTPATIENT
Start: 2019-04-29 | End: 2019-05-02 | Stop reason: HOSPADM

## 2019-04-29 RX ORDER — FLUOXETINE 10 MG/1
10 CAPSULE ORAL DAILY
Status: CANCELLED | OUTPATIENT
Start: 2019-04-29

## 2019-04-29 RX ORDER — FLUOXETINE 10 MG/1
10 CAPSULE ORAL DAILY
Status: DISCONTINUED | OUTPATIENT
Start: 2019-04-29 | End: 2019-05-02 | Stop reason: HOSPADM

## 2019-04-29 RX ORDER — ROCURONIUM BROMIDE 10 MG/ML
INJECTION, SOLUTION INTRAVENOUS AS NEEDED
Status: DISCONTINUED | OUTPATIENT
Start: 2019-04-29 | End: 2019-04-29 | Stop reason: SURG

## 2019-04-29 RX ORDER — GLYCOPYRROLATE 0.2 MG/ML
INJECTION INTRAMUSCULAR; INTRAVENOUS AS NEEDED
Status: DISCONTINUED | OUTPATIENT
Start: 2019-04-29 | End: 2019-04-29 | Stop reason: SURG

## 2019-04-29 RX ORDER — SUCRALFATE 1 G/1
1 TABLET ORAL 4 TIMES DAILY
Status: DISCONTINUED | OUTPATIENT
Start: 2019-04-29 | End: 2019-05-02 | Stop reason: HOSPADM

## 2019-04-29 RX ORDER — FLUOXETINE 10 MG/1
10 CAPSULE ORAL DAILY
COMMUNITY

## 2019-04-29 RX ORDER — CHLORTHALIDONE 50 MG/1
25 TABLET ORAL DAILY
Status: DISCONTINUED | OUTPATIENT
Start: 2019-04-30 | End: 2019-05-02 | Stop reason: HOSPADM

## 2019-04-29 RX ORDER — PROPOFOL 10 MG/ML
VIAL (ML) INTRAVENOUS AS NEEDED
Status: DISCONTINUED | OUTPATIENT
Start: 2019-04-29 | End: 2019-04-29 | Stop reason: SURG

## 2019-04-29 RX ORDER — BUPIVACAINE HYDROCHLORIDE AND EPINEPHRINE 5; 5 MG/ML; UG/ML
INJECTION, SOLUTION EPIDURAL; INTRACAUDAL; PERINEURAL AS NEEDED
Status: DISCONTINUED | OUTPATIENT
Start: 2019-04-29 | End: 2019-04-29 | Stop reason: HOSPADM

## 2019-04-29 RX ORDER — ONDANSETRON 4 MG/1
4 TABLET, FILM COATED ORAL EVERY 6 HOURS PRN
Status: DISCONTINUED | OUTPATIENT
Start: 2019-04-29 | End: 2019-05-02 | Stop reason: HOSPADM

## 2019-04-29 RX ORDER — PANTOPRAZOLE SODIUM 40 MG/1
40 TABLET, DELAYED RELEASE ORAL
Status: DISCONTINUED | OUTPATIENT
Start: 2019-04-29 | End: 2019-05-02 | Stop reason: HOSPADM

## 2019-04-29 RX ORDER — SUCRALFATE 1 G/1
1 TABLET ORAL 4 TIMES DAILY
Status: CANCELLED | OUTPATIENT
Start: 2019-04-29

## 2019-04-29 RX ORDER — OXCARBAZEPINE 300 MG/1
600 TABLET, FILM COATED ORAL EVERY 12 HOURS SCHEDULED
Status: DISCONTINUED | OUTPATIENT
Start: 2019-04-29 | End: 2019-05-01

## 2019-04-29 RX ORDER — ALBUTEROL SULFATE 90 UG/1
2 AEROSOL, METERED RESPIRATORY (INHALATION) EVERY 4 HOURS PRN
Status: CANCELLED | OUTPATIENT
Start: 2019-04-29

## 2019-04-29 RX ORDER — TRAZODONE HYDROCHLORIDE 50 MG/1
100 TABLET ORAL NIGHTLY
Status: DISCONTINUED | OUTPATIENT
Start: 2019-04-29 | End: 2019-05-02 | Stop reason: HOSPADM

## 2019-04-29 RX ORDER — CETIRIZINE HYDROCHLORIDE 10 MG/1
10 TABLET ORAL NIGHTLY
Status: CANCELLED | OUTPATIENT
Start: 2019-04-29

## 2019-04-29 RX ORDER — ONDANSETRON 2 MG/ML
INJECTION INTRAMUSCULAR; INTRAVENOUS AS NEEDED
Status: DISCONTINUED | OUTPATIENT
Start: 2019-04-29 | End: 2019-04-29 | Stop reason: SURG

## 2019-04-29 RX ORDER — TRAZODONE HYDROCHLORIDE 50 MG/1
100 TABLET ORAL NIGHTLY
Status: CANCELLED | OUTPATIENT
Start: 2019-04-29

## 2019-04-29 RX ORDER — FENTANYL CITRATE 50 UG/ML
INJECTION, SOLUTION INTRAMUSCULAR; INTRAVENOUS AS NEEDED
Status: DISCONTINUED | OUTPATIENT
Start: 2019-04-29 | End: 2019-04-29 | Stop reason: SURG

## 2019-04-29 RX ORDER — SODIUM CHLORIDE 9 MG/ML
INJECTION, SOLUTION INTRAVENOUS AS NEEDED
Status: DISCONTINUED | OUTPATIENT
Start: 2019-04-29 | End: 2019-04-29 | Stop reason: HOSPADM

## 2019-04-29 RX ORDER — OXYCODONE HYDROCHLORIDE 5 MG/1
10 TABLET ORAL EVERY 6 HOURS PRN
Status: CANCELLED | OUTPATIENT
Start: 2019-04-29

## 2019-04-29 RX ORDER — LEVOFLOXACIN 5 MG/ML
500 INJECTION, SOLUTION INTRAVENOUS ONCE
Status: COMPLETED | OUTPATIENT
Start: 2019-04-29 | End: 2019-04-29

## 2019-04-29 RX ORDER — CETIRIZINE HYDROCHLORIDE 10 MG/1
10 TABLET ORAL NIGHTLY
Status: DISCONTINUED | OUTPATIENT
Start: 2019-04-29 | End: 2019-05-02 | Stop reason: HOSPADM

## 2019-04-29 RX ORDER — NALOXONE HCL 0.4 MG/ML
0.4 VIAL (ML) INJECTION
Status: DISCONTINUED | OUTPATIENT
Start: 2019-04-29 | End: 2019-05-02 | Stop reason: HOSPADM

## 2019-04-29 RX ORDER — MONTELUKAST SODIUM 10 MG/1
10 TABLET ORAL NIGHTLY
Status: CANCELLED | OUTPATIENT
Start: 2019-04-29

## 2019-04-29 RX ORDER — ALBUTEROL SULFATE 90 UG/1
2 AEROSOL, METERED RESPIRATORY (INHALATION) EVERY 4 HOURS PRN
COMMUNITY

## 2019-04-29 RX ORDER — OXYCODONE AND ACETAMINOPHEN 10; 325 MG/1; MG/1
1 TABLET ORAL EVERY 4 HOURS PRN
Status: DISCONTINUED | OUTPATIENT
Start: 2019-04-29 | End: 2019-05-02 | Stop reason: HOSPADM

## 2019-04-29 RX ORDER — LIDOCAINE HYDROCHLORIDE 20 MG/ML
INJECTION, SOLUTION INFILTRATION; PERINEURAL AS NEEDED
Status: DISCONTINUED | OUTPATIENT
Start: 2019-04-29 | End: 2019-04-29 | Stop reason: SURG

## 2019-04-29 RX ORDER — FAMOTIDINE 10 MG/ML
INJECTION, SOLUTION INTRAVENOUS AS NEEDED
Status: DISCONTINUED | OUTPATIENT
Start: 2019-04-29 | End: 2019-04-29 | Stop reason: SURG

## 2019-04-29 RX ORDER — VERAPAMIL HYDROCHLORIDE 2.5 MG/ML
INJECTION, SOLUTION INTRAVENOUS AS NEEDED
Status: DISCONTINUED | OUTPATIENT
Start: 2019-04-29 | End: 2019-04-29 | Stop reason: SURG

## 2019-04-29 RX ORDER — SODIUM CHLORIDE, SODIUM LACTATE, POTASSIUM CHLORIDE, CALCIUM CHLORIDE 600; 310; 30; 20 MG/100ML; MG/100ML; MG/100ML; MG/100ML
125 INJECTION, SOLUTION INTRAVENOUS CONTINUOUS
Status: DISCONTINUED | OUTPATIENT
Start: 2019-04-29 | End: 2019-04-29

## 2019-04-29 RX ORDER — FENTANYL CITRATE 50 UG/ML
50 INJECTION, SOLUTION INTRAMUSCULAR; INTRAVENOUS
Status: DISCONTINUED | OUTPATIENT
Start: 2019-04-29 | End: 2019-04-29 | Stop reason: HOSPADM

## 2019-04-29 RX ORDER — CHLORTHALIDONE 50 MG/1
25 TABLET ORAL DAILY
Status: CANCELLED | OUTPATIENT
Start: 2019-04-30

## 2019-04-29 RX ORDER — ALBUTEROL SULFATE 2.5 MG/3ML
2.5 SOLUTION RESPIRATORY (INHALATION) EVERY 4 HOURS PRN
Status: DISCONTINUED | OUTPATIENT
Start: 2019-04-29 | End: 2019-05-02 | Stop reason: HOSPADM

## 2019-04-29 RX ORDER — GABAPENTIN 300 MG/1
600 CAPSULE ORAL EVERY 8 HOURS SCHEDULED
Status: CANCELLED | OUTPATIENT
Start: 2019-04-29

## 2019-04-29 RX ORDER — SODIUM CHLORIDE 0.9 % (FLUSH) 0.9 %
3-10 SYRINGE (ML) INJECTION AS NEEDED
Status: DISCONTINUED | OUTPATIENT
Start: 2019-04-29 | End: 2019-04-29 | Stop reason: HOSPADM

## 2019-04-29 RX ORDER — MEPERIDINE HYDROCHLORIDE 25 MG/ML
12.5 INJECTION INTRAMUSCULAR; INTRAVENOUS; SUBCUTANEOUS
Status: DISCONTINUED | OUTPATIENT
Start: 2019-04-29 | End: 2019-04-29 | Stop reason: HOSPADM

## 2019-04-29 RX ADMIN — NEOSTIGMINE METHYLSULFATE 4 MG: 1 INJECTION, SOLUTION INTRAVENOUS at 09:24

## 2019-04-29 RX ADMIN — LEVOFLOXACIN 500 MG: 5 INJECTION, SOLUTION INTRAVENOUS at 07:36

## 2019-04-29 RX ADMIN — FLUOXETINE HYDROCHLORIDE 10 MG: 10 CAPSULE ORAL at 15:38

## 2019-04-29 RX ADMIN — MONTELUKAST SODIUM 10 MG: 10 TABLET, COATED ORAL at 20:35

## 2019-04-29 RX ADMIN — OXCARBAZEPINE 600 MG: 300 TABLET, FILM COATED ORAL at 15:37

## 2019-04-29 RX ADMIN — SODIUM CHLORIDE, POTASSIUM CHLORIDE, SODIUM LACTATE AND CALCIUM CHLORIDE: 600; 310; 30; 20 INJECTION, SOLUTION INTRAVENOUS at 09:37

## 2019-04-29 RX ADMIN — FENTANYL CITRATE 100 MCG: 50 INJECTION INTRAMUSCULAR; INTRAVENOUS at 07:36

## 2019-04-29 RX ADMIN — VERAPAMIL HYDROCHLORIDE 5 MG: 2.5 INJECTION, SOLUTION INTRAVENOUS at 10:03

## 2019-04-29 RX ADMIN — PANTOPRAZOLE SODIUM 40 MG: 40 TABLET, DELAYED RELEASE ORAL at 17:12

## 2019-04-29 RX ADMIN — ROCURONIUM BROMIDE 5 MG: 10 INJECTION INTRAVENOUS at 07:42

## 2019-04-29 RX ADMIN — OXYCODONE HYDROCHLORIDE AND ACETAMINOPHEN 1 TABLET: 10; 325 TABLET ORAL at 15:44

## 2019-04-29 RX ADMIN — BUSPIRONE HYDROCHLORIDE 5 MG: 5 TABLET ORAL at 15:38

## 2019-04-29 RX ADMIN — SODIUM CHLORIDE 100 ML/HR: 9 INJECTION, SOLUTION INTRAVENOUS at 11:34

## 2019-04-29 RX ADMIN — ONDANSETRON 4 MG: 2 INJECTION, SOLUTION INTRAMUSCULAR; INTRAVENOUS at 09:24

## 2019-04-29 RX ADMIN — CETIRIZINE HYDROCHLORIDE 10 MG: 10 TABLET, FILM COATED ORAL at 20:35

## 2019-04-29 RX ADMIN — GABAPENTIN 600 MG: 300 CAPSULE ORAL at 20:34

## 2019-04-29 RX ADMIN — GABAPENTIN 600 MG: 300 CAPSULE ORAL at 15:43

## 2019-04-29 RX ADMIN — MIDAZOLAM HYDROCHLORIDE 2 MG: 1 INJECTION, SOLUTION INTRAMUSCULAR; INTRAVENOUS at 07:36

## 2019-04-29 RX ADMIN — PROPOFOL 150 MG: 10 INJECTION, EMULSION INTRAVENOUS at 07:42

## 2019-04-29 RX ADMIN — OXCARBAZEPINE 600 MG: 300 TABLET, FILM COATED ORAL at 20:35

## 2019-04-29 RX ADMIN — FAMOTIDINE 20 MG: 10 INJECTION, SOLUTION INTRAVENOUS at 07:36

## 2019-04-29 RX ADMIN — GLYCOPYRROLATE 0.8 MG: 0.2 INJECTION, SOLUTION INTRAMUSCULAR; INTRAVENOUS at 09:24

## 2019-04-29 RX ADMIN — BUSPIRONE HYDROCHLORIDE 5 MG: 5 TABLET ORAL at 20:35

## 2019-04-29 RX ADMIN — SODIUM CHLORIDE 100 ML/HR: 9 INJECTION, SOLUTION INTRAVENOUS at 21:02

## 2019-04-29 RX ADMIN — SODIUM CHLORIDE, POTASSIUM CHLORIDE, SODIUM LACTATE AND CALCIUM CHLORIDE 125 ML/HR: 600; 310; 30; 20 INJECTION, SOLUTION INTRAVENOUS at 07:21

## 2019-04-29 RX ADMIN — METFORMIN HYDROCHLORIDE 1000 MG: 500 TABLET ORAL at 17:12

## 2019-04-29 RX ADMIN — LINAGLIPTIN 5 MG: 5 TABLET, FILM COATED ORAL at 15:37

## 2019-04-29 RX ADMIN — SUCRALFATE 1 G: 1 TABLET ORAL at 17:12

## 2019-04-29 RX ADMIN — SUCRALFATE 1 G: 1 TABLET ORAL at 20:35

## 2019-04-29 RX ADMIN — OXYCODONE HYDROCHLORIDE AND ACETAMINOPHEN 1 TABLET: 10; 325 TABLET ORAL at 21:02

## 2019-04-29 RX ADMIN — AMLODIPINE BESYLATE 5 MG: 5 TABLET ORAL at 15:36

## 2019-04-29 RX ADMIN — POLYETHYLENE GLYCOL (3350) 17 G: 17 POWDER, FOR SOLUTION ORAL at 15:38

## 2019-04-29 RX ADMIN — ROCURONIUM BROMIDE 30 MG: 10 INJECTION INTRAVENOUS at 07:57

## 2019-04-29 RX ADMIN — TRAZODONE HYDROCHLORIDE 100 MG: 50 TABLET ORAL at 20:35

## 2019-04-29 RX ADMIN — LIDOCAINE HYDROCHLORIDE 60 MG: 20 INJECTION, SOLUTION INFILTRATION; PERINEURAL at 07:42

## 2019-04-29 RX ADMIN — MORPHINE SULFATE 6 MG: 8 INJECTION INTRAVENOUS at 10:51

## 2019-04-29 NOTE — ANESTHESIA PROCEDURE NOTES
Airway  Urgency: elective    Date/Time: 4/29/2019 7:42 AM  Airway not difficult    General Information and Staff    Patient location during procedure: OR  Anesthesiologist: Yahir Diaz MD  CRNA: Xenia Berg CRNA    Indications and Patient Condition  Indications for airway management: airway protection    Preoxygenated: yes  MILS maintained throughout  Mask difficulty assessment: 0 - not attempted    Final Airway Details  Final airway type: endotracheal airway      Successful airway: ETT  Cuffed: yes   Successful intubation technique: direct laryngoscopy and RSI  Facilitating devices/methods: cricoid pressure  Endotracheal tube insertion site: oral  Blade: Lencho  Blade size: 3  ETT size (mm): 7.5  Cormack-Lehane Classification: grade IIa - partial view of glottis  Placement verified by: chest auscultation, capnometry and palpation of cuff   Cuff volume (mL): 6  Measured from: lips  ETT to lips (cm): 22  Number of attempts at approach: 1    Additional Comments  Dentition as preop. No complications noted. Patient tolerated well.  ETT secured.

## 2019-04-29 NOTE — ANESTHESIA PREPROCEDURE EVALUATION
Anesthesia Evaluation     Patient summary reviewed and Nursing notes reviewed   no history of anesthetic complications:  NPO Solid Status: > 8 hours  NPO Liquid Status: > 8 hours           Airway   Mallampati: II  TM distance: >3 FB  Neck ROM: full  No difficulty expected  Dental - normal exam         Pulmonary - normal exam   (+) asthma, sleep apnea,   (-) not a smoker  Cardiovascular - normal exam  Exercise tolerance: good (4-7 METS)    NYHA Classification: II    (+) hypertension, valvular problems/murmurs MR and TI, hyperlipidemia,       Neuro/Psych  (+) seizures (last one Nov 2018), numbness, psychiatric history Depression and Anxiety,     GI/Hepatic/Renal/Endo    (+)  hiatal hernia, GERD, PUD,  diabetes mellitus,     Musculoskeletal     (+) back pain, gait problem,   Abdominal  - normal exam    Bowel sounds: normal.   Substance History - negative use     OB/GYN negative ob/gyn ROS         Other   (+) arthritis     ROS/Med Hx Other: [place]  Hears best on left                    Anesthesia Plan    ASA 3     general     intravenous induction   Anesthetic plan, all risks, benefits, and alternatives have been provided, discussed and informed consent has been obtained with: patient and child.  Use of blood products discussed with patient and child  Consented to blood products.

## 2019-04-29 NOTE — ANESTHESIA POSTPROCEDURE EVALUATION
Patient: Mary Kay Goldstein    Procedure Summary     Date:  04/29/19 Room / Location:  [facility] OR 03 /  [facility] OR    Anesthesia Start:  0734 Anesthesia Stop:  0951    Procedure:  Lysis of adhesions repair of colon enterotomy (N/A Abdomen) Diagnosis:       Gastroesophageal reflux disease, esophagitis presence not specified      (Gastroesophageal reflux disease, esophagitis presence not specified [K21.9])    Surgeon:  Lukas Nelson MD Provider:  Yahir Diaz MD    Anesthesia Type:  general ASA Status:  3          Anesthesia Type: general  Last vitals  BP   167/71 (04/29/19 1029)   Temp   97.3 °F (36.3 °C) (04/29/19 1029)   Pulse   63 (04/29/19 1029)   Resp   14 (04/29/19 1029)     SpO2   100 % (04/29/19 1029)     Post Anesthesia Care and Evaluation    Patient location during evaluation: PACU  Patient participation: complete - patient participated  Level of consciousness: awake and alert  Pain score: 1  Pain management: adequate  Airway patency: patent  Anesthetic complications: No anesthetic complications  PONV Status: controlled  Cardiovascular status: acceptable  Respiratory status: acceptable  Hydration status: acceptable

## 2019-04-30 LAB — GLUCOSE BLDC GLUCOMTR-MCNC: 297 MG/DL (ref 70–130)

## 2019-04-30 PROCEDURE — 94799 UNLISTED PULMONARY SVC/PX: CPT

## 2019-04-30 PROCEDURE — 25010000002 MORPHINE PER 10 MG: Performed by: SURGERY

## 2019-04-30 PROCEDURE — 25010000002 ENOXAPARIN PER 10 MG: Performed by: SURGERY

## 2019-04-30 PROCEDURE — 25010000002 ONDANSETRON PER 1 MG: Performed by: SURGERY

## 2019-04-30 PROCEDURE — 99024 POSTOP FOLLOW-UP VISIT: CPT | Performed by: SURGERY

## 2019-04-30 RX ADMIN — AMLODIPINE BESYLATE 5 MG: 5 TABLET ORAL at 08:32

## 2019-04-30 RX ADMIN — ONDANSETRON 4 MG: 2 INJECTION, SOLUTION INTRAMUSCULAR; INTRAVENOUS at 17:28

## 2019-04-30 RX ADMIN — GABAPENTIN 600 MG: 300 CAPSULE ORAL at 05:03

## 2019-04-30 RX ADMIN — POLYETHYLENE GLYCOL (3350) 17 G: 17 POWDER, FOR SOLUTION ORAL at 08:33

## 2019-04-30 RX ADMIN — MONTELUKAST SODIUM 10 MG: 10 TABLET, COATED ORAL at 21:10

## 2019-04-30 RX ADMIN — OXYCODONE HYDROCHLORIDE AND ACETAMINOPHEN 1 TABLET: 10; 325 TABLET ORAL at 04:18

## 2019-04-30 RX ADMIN — PANTOPRAZOLE SODIUM 40 MG: 40 TABLET, DELAYED RELEASE ORAL at 17:24

## 2019-04-30 RX ADMIN — CHLORTHALIDONE 25 MG: 50 TABLET ORAL at 08:33

## 2019-04-30 RX ADMIN — SUCRALFATE 1 G: 1 TABLET ORAL at 11:45

## 2019-04-30 RX ADMIN — PANTOPRAZOLE SODIUM 40 MG: 40 TABLET, DELAYED RELEASE ORAL at 08:32

## 2019-04-30 RX ADMIN — OXYCODONE HYDROCHLORIDE AND ACETAMINOPHEN 1 TABLET: 10; 325 TABLET ORAL at 11:45

## 2019-04-30 RX ADMIN — BUSPIRONE HYDROCHLORIDE 5 MG: 5 TABLET ORAL at 21:10

## 2019-04-30 RX ADMIN — OXCARBAZEPINE 600 MG: 300 TABLET, FILM COATED ORAL at 21:10

## 2019-04-30 RX ADMIN — GABAPENTIN 600 MG: 300 CAPSULE ORAL at 14:23

## 2019-04-30 RX ADMIN — OXCARBAZEPINE 600 MG: 300 TABLET, FILM COATED ORAL at 08:33

## 2019-04-30 RX ADMIN — TRAZODONE HYDROCHLORIDE 100 MG: 50 TABLET ORAL at 21:10

## 2019-04-30 RX ADMIN — SUCRALFATE 1 G: 1 TABLET ORAL at 17:21

## 2019-04-30 RX ADMIN — CETIRIZINE HYDROCHLORIDE 10 MG: 10 TABLET, FILM COATED ORAL at 21:10

## 2019-04-30 RX ADMIN — FLUOXETINE HYDROCHLORIDE 10 MG: 10 CAPSULE ORAL at 08:33

## 2019-04-30 RX ADMIN — ENOXAPARIN SODIUM 40 MG: 40 INJECTION SUBCUTANEOUS at 08:32

## 2019-04-30 RX ADMIN — MORPHINE SULFATE 6 MG: 8 INJECTION INTRAVENOUS at 23:47

## 2019-04-30 RX ADMIN — BUSPIRONE HYDROCHLORIDE 5 MG: 5 TABLET ORAL at 08:32

## 2019-04-30 RX ADMIN — METFORMIN HYDROCHLORIDE 1000 MG: 500 TABLET ORAL at 08:32

## 2019-04-30 RX ADMIN — METFORMIN HYDROCHLORIDE 1000 MG: 500 TABLET ORAL at 17:21

## 2019-04-30 RX ADMIN — SUCRALFATE 1 G: 1 TABLET ORAL at 08:32

## 2019-04-30 RX ADMIN — SODIUM CHLORIDE 100 ML/HR: 9 INJECTION, SOLUTION INTRAVENOUS at 20:46

## 2019-04-30 RX ADMIN — SODIUM CHLORIDE 100 ML/HR: 9 INJECTION, SOLUTION INTRAVENOUS at 08:33

## 2019-04-30 RX ADMIN — SUCRALFATE 1 G: 1 TABLET ORAL at 21:10

## 2019-04-30 RX ADMIN — LINAGLIPTIN 5 MG: 5 TABLET, FILM COATED ORAL at 08:33

## 2019-04-30 RX ADMIN — GABAPENTIN 600 MG: 300 CAPSULE ORAL at 21:10

## 2019-05-01 LAB
ANION GAP SERPL CALCULATED.3IONS-SCNC: 9.5 MMOL/L
BASOPHILS # BLD AUTO: 0.02 10*3/MM3 (ref 0–0.2)
BASOPHILS NFR BLD AUTO: 0.2 % (ref 0–1.5)
BUN BLD-MCNC: 2 MG/DL (ref 8–23)
BUN/CREAT SERPL: 4 (ref 7–25)
CALCIUM SPEC-SCNC: 8.2 MG/DL (ref 8.6–10.5)
CHLORIDE SERPL-SCNC: 83 MMOL/L (ref 98–107)
CO2 SERPL-SCNC: 31.5 MMOL/L (ref 22–29)
CREAT BLD-MCNC: 0.5 MG/DL (ref 0.57–1)
DEPRECATED RDW RBC AUTO: 44 FL (ref 37–54)
EOSINOPHIL # BLD AUTO: 0.01 10*3/MM3 (ref 0–0.4)
EOSINOPHIL NFR BLD AUTO: 0.1 % (ref 0.3–6.2)
ERYTHROCYTE [DISTWIDTH] IN BLOOD BY AUTOMATED COUNT: 15.2 % (ref 12.3–15.4)
GFR SERPL CREATININE-BSD FRML MDRD: 125 ML/MIN/1.73
GLUCOSE BLD-MCNC: 175 MG/DL (ref 65–99)
HCT VFR BLD AUTO: 28.9 % (ref 34–46.6)
HGB BLD-MCNC: 9.5 G/DL (ref 12–15.9)
IMM GRANULOCYTES # BLD AUTO: 0.04 10*3/MM3 (ref 0–0.05)
IMM GRANULOCYTES NFR BLD AUTO: 0.3 % (ref 0–0.5)
LYMPHOCYTES # BLD AUTO: 0.99 10*3/MM3 (ref 0.7–3.1)
LYMPHOCYTES NFR BLD AUTO: 8 % (ref 19.6–45.3)
MCH RBC QN AUTO: 26.5 PG (ref 26.6–33)
MCHC RBC AUTO-ENTMCNC: 32.9 G/DL (ref 31.5–35.7)
MCV RBC AUTO: 80.7 FL (ref 79–97)
MONOCYTES # BLD AUTO: 1.25 10*3/MM3 (ref 0.1–0.9)
MONOCYTES NFR BLD AUTO: 10.1 % (ref 5–12)
NEUTROPHILS # BLD AUTO: 10.04 10*3/MM3 (ref 1.7–7)
NEUTROPHILS NFR BLD AUTO: 81.3 % (ref 42.7–76)
PLATELET # BLD AUTO: 211 10*3/MM3 (ref 140–450)
PMV BLD AUTO: 9.1 FL (ref 6–12)
POTASSIUM BLD-SCNC: 2.6 MMOL/L (ref 3.5–5.2)
POTASSIUM BLD-SCNC: 2.8 MMOL/L (ref 3.5–5.2)
RBC # BLD AUTO: 3.58 10*6/MM3 (ref 3.77–5.28)
SODIUM BLD-SCNC: 124 MMOL/L (ref 136–145)
WBC NRBC COR # BLD: 12.35 10*3/MM3 (ref 3.4–10.8)

## 2019-05-01 PROCEDURE — 94799 UNLISTED PULMONARY SVC/PX: CPT

## 2019-05-01 PROCEDURE — 94640 AIRWAY INHALATION TREATMENT: CPT

## 2019-05-01 PROCEDURE — 25010000002 MORPHINE PER 10 MG: Performed by: SURGERY

## 2019-05-01 PROCEDURE — 84132 ASSAY OF SERUM POTASSIUM: CPT | Performed by: SURGERY

## 2019-05-01 PROCEDURE — 80048 BASIC METABOLIC PNL TOTAL CA: CPT | Performed by: SURGERY

## 2019-05-01 PROCEDURE — 25010000002 ENOXAPARIN PER 10 MG: Performed by: SURGERY

## 2019-05-01 PROCEDURE — 85025 COMPLETE CBC W/AUTO DIFF WBC: CPT | Performed by: SURGERY

## 2019-05-01 PROCEDURE — 99024 POSTOP FOLLOW-UP VISIT: CPT | Performed by: SURGERY

## 2019-05-01 RX ORDER — OXCARBAZEPINE 300 MG/1
1200 TABLET, FILM COATED ORAL NIGHTLY
Status: DISCONTINUED | OUTPATIENT
Start: 2019-05-02 | End: 2019-05-02 | Stop reason: HOSPADM

## 2019-05-01 RX ORDER — GABAPENTIN 300 MG/1
600 CAPSULE ORAL NIGHTLY
Status: DISCONTINUED | OUTPATIENT
Start: 2019-05-01 | End: 2019-05-02 | Stop reason: HOSPADM

## 2019-05-01 RX ORDER — POTASSIUM CHLORIDE 1.5 G/1.77G
40 POWDER, FOR SOLUTION ORAL AS NEEDED
Status: DISCONTINUED | OUTPATIENT
Start: 2019-05-01 | End: 2019-05-02 | Stop reason: HOSPADM

## 2019-05-01 RX ORDER — POTASSIUM CHLORIDE 20 MEQ/1
40 TABLET, EXTENDED RELEASE ORAL AS NEEDED
Status: DISCONTINUED | OUTPATIENT
Start: 2019-05-01 | End: 2019-05-02 | Stop reason: HOSPADM

## 2019-05-01 RX ORDER — POTASSIUM CHLORIDE 7.45 MG/ML
10 INJECTION INTRAVENOUS
Status: DISCONTINUED | OUTPATIENT
Start: 2019-05-01 | End: 2019-05-01 | Stop reason: CLARIF

## 2019-05-01 RX ORDER — POTASSIUM CHLORIDE 7.45 MG/ML
10 INJECTION INTRAVENOUS
Status: DISCONTINUED | OUTPATIENT
Start: 2019-05-01 | End: 2019-05-02 | Stop reason: HOSPADM

## 2019-05-01 RX ORDER — POTASSIUM CHLORIDE 20 MEQ/1
40 TABLET, EXTENDED RELEASE ORAL EVERY 4 HOURS
Status: COMPLETED | OUTPATIENT
Start: 2019-05-01 | End: 2019-05-02

## 2019-05-01 RX ORDER — OXCARBAZEPINE 300 MG/1
600 TABLET, FILM COATED ORAL NIGHTLY
Status: COMPLETED | OUTPATIENT
Start: 2019-05-01 | End: 2019-05-01

## 2019-05-01 RX ORDER — POTASSIUM CHLORIDE 20 MEQ/1
40 TABLET, EXTENDED RELEASE ORAL EVERY 4 HOURS
Status: COMPLETED | OUTPATIENT
Start: 2019-05-01 | End: 2019-05-01

## 2019-05-01 RX ADMIN — SUCRALFATE 1 G: 1 TABLET ORAL at 11:41

## 2019-05-01 RX ADMIN — ENOXAPARIN SODIUM 40 MG: 40 INJECTION SUBCUTANEOUS at 08:34

## 2019-05-01 RX ADMIN — AMLODIPINE BESYLATE 5 MG: 5 TABLET ORAL at 08:34

## 2019-05-01 RX ADMIN — OXCARBAZEPINE 600 MG: 300 TABLET, FILM COATED ORAL at 08:30

## 2019-05-01 RX ADMIN — MONTELUKAST SODIUM 10 MG: 10 TABLET, COATED ORAL at 21:23

## 2019-05-01 RX ADMIN — SUCRALFATE 1 G: 1 TABLET ORAL at 17:31

## 2019-05-01 RX ADMIN — SUCRALFATE 1 G: 1 TABLET ORAL at 08:31

## 2019-05-01 RX ADMIN — GABAPENTIN 600 MG: 300 CAPSULE ORAL at 05:42

## 2019-05-01 RX ADMIN — OXCARBAZEPINE 600 MG: 300 TABLET, FILM COATED ORAL at 21:23

## 2019-05-01 RX ADMIN — PANTOPRAZOLE SODIUM 40 MG: 40 TABLET, DELAYED RELEASE ORAL at 06:43

## 2019-05-01 RX ADMIN — BUSPIRONE HYDROCHLORIDE 5 MG: 5 TABLET ORAL at 21:23

## 2019-05-01 RX ADMIN — CHLORTHALIDONE 25 MG: 50 TABLET ORAL at 08:29

## 2019-05-01 RX ADMIN — ALBUTEROL SULFATE 2.5 MG: 2.5 SOLUTION RESPIRATORY (INHALATION) at 07:13

## 2019-05-01 RX ADMIN — METFORMIN HYDROCHLORIDE 1000 MG: 500 TABLET ORAL at 17:31

## 2019-05-01 RX ADMIN — OXYCODONE HYDROCHLORIDE AND ACETAMINOPHEN 1 TABLET: 10; 325 TABLET ORAL at 14:52

## 2019-05-01 RX ADMIN — FLUOXETINE HYDROCHLORIDE 10 MG: 10 CAPSULE ORAL at 08:31

## 2019-05-01 RX ADMIN — POTASSIUM CHLORIDE 40 MEQ: 1500 TABLET, EXTENDED RELEASE ORAL at 05:43

## 2019-05-01 RX ADMIN — POLYETHYLENE GLYCOL (3350) 17 G: 17 POWDER, FOR SOLUTION ORAL at 08:30

## 2019-05-01 RX ADMIN — OXYCODONE HYDROCHLORIDE AND ACETAMINOPHEN 1 TABLET: 10; 325 TABLET ORAL at 08:52

## 2019-05-01 RX ADMIN — SODIUM CHLORIDE 100 ML/HR: 9 INJECTION, SOLUTION INTRAVENOUS at 08:46

## 2019-05-01 RX ADMIN — OXYCODONE HYDROCHLORIDE AND ACETAMINOPHEN 1 TABLET: 10; 325 TABLET ORAL at 18:54

## 2019-05-01 RX ADMIN — POTASSIUM CHLORIDE 40 MEQ: 1500 TABLET, EXTENDED RELEASE ORAL at 14:16

## 2019-05-01 RX ADMIN — POTASSIUM CHLORIDE 40 MEQ: 1500 TABLET, EXTENDED RELEASE ORAL at 08:54

## 2019-05-01 RX ADMIN — CETIRIZINE HYDROCHLORIDE 10 MG: 10 TABLET, FILM COATED ORAL at 21:24

## 2019-05-01 RX ADMIN — METFORMIN HYDROCHLORIDE 1000 MG: 500 TABLET ORAL at 08:31

## 2019-05-01 RX ADMIN — LINAGLIPTIN 5 MG: 5 TABLET, FILM COATED ORAL at 08:30

## 2019-05-01 RX ADMIN — SODIUM CHLORIDE 100 ML/HR: 9 INJECTION, SOLUTION INTRAVENOUS at 17:32

## 2019-05-01 RX ADMIN — TRAZODONE HYDROCHLORIDE 100 MG: 50 TABLET ORAL at 21:23

## 2019-05-01 RX ADMIN — POTASSIUM CHLORIDE 40 MEQ: 1500 TABLET, EXTENDED RELEASE ORAL at 21:22

## 2019-05-01 RX ADMIN — BUSPIRONE HYDROCHLORIDE 5 MG: 5 TABLET ORAL at 08:31

## 2019-05-01 RX ADMIN — SUCRALFATE 1 G: 1 TABLET ORAL at 21:23

## 2019-05-01 RX ADMIN — PANTOPRAZOLE SODIUM 40 MG: 40 TABLET, DELAYED RELEASE ORAL at 17:31

## 2019-05-01 RX ADMIN — MORPHINE SULFATE 6 MG: 8 INJECTION INTRAVENOUS at 22:41

## 2019-05-01 RX ADMIN — GABAPENTIN 600 MG: 300 CAPSULE ORAL at 21:22

## 2019-05-01 NOTE — PLAN OF CARE
Problem: Patient Care Overview  Goal: Plan of Care Review  Outcome: Ongoing (interventions implemented as appropriate)      Problem: Fall Risk (Adult)  Goal: Absence of Fall  Outcome: Ongoing (interventions implemented as appropriate)      Problem: Skin Injury Risk (Adult)  Goal: Skin Health and Integrity  Outcome: Ongoing (interventions implemented as appropriate)      Problem: Surgery Nonspecified (Adult)  Goal: Signs and Symptoms of Listed Potential Problems Will be Absent, Minimized or Managed (Surgery Nonspecified)  Outcome: Ongoing (interventions implemented as appropriate)      Problem: Pain, Acute (Adult)  Goal: Acceptable Pain Control/Comfort Level  Outcome: Ongoing (interventions implemented as appropriate)

## 2019-05-01 NOTE — PROGRESS NOTES
Postop day #2    Patient feels okay.    Afebrile.  Pulse 102.    Wounds okay    White count up to 12,000.    Will keep for another day.

## 2019-05-01 NOTE — PLAN OF CARE
Problem: Patient Care Overview  Goal: Plan of Care Review  Outcome: Ongoing (interventions implemented as appropriate)   04/30/19 1513 04/30/19 2110   Coping/Psychosocial   Plan of Care Reviewed With --  patient;son   Plan of Care Review   Progress improving --        Problem: Fall Risk (Adult)  Goal: Absence of Fall  Outcome: Ongoing (interventions implemented as appropriate)   04/30/19 2305   Fall Risk (Adult)   Absence of Fall making progress toward outcome       Problem: Skin Injury Risk (Adult)  Goal: Skin Health and Integrity  Outcome: Ongoing (interventions implemented as appropriate)   04/30/19 2305   Skin Injury Risk (Adult)   Skin Health and Integrity making progress toward outcome       Problem: Pain, Acute (Adult)  Goal: Identify Related Risk Factors and Signs and Symptoms  Outcome: Ongoing (interventions implemented as appropriate)   04/30/19 1513   Pain, Acute (Adult)   Related Risk Factors (Acute Pain) knowledge deficit;patient perception   Signs and Symptoms (Acute Pain) facial mask of pain/grimace;fatigue/weakness;verbalization of pain descriptors     Goal: Acceptable Pain Control/Comfort Level  Outcome: Ongoing (interventions implemented as appropriate)   04/30/19 2305   Pain, Acute (Adult)   Acceptable Pain Control/Comfort Level making progress toward outcome

## 2019-05-01 NOTE — NURSING NOTE
Spoke with patient and her son concerning Neurontin and trileptal doseages. Patient's so was concerned that she was to sleepy during the day. He and pt state that she only takes Neurontin 600mg nightly at home and takes 1200mg of trileptal at night instead of dividing it into two 600mg doses and that she has taken it that way for a long time. I spoke with Dr. Nelson he gave orders to change both medications to the way the patient took them at home.

## 2019-05-02 VITALS
SYSTOLIC BLOOD PRESSURE: 178 MMHG | RESPIRATION RATE: 20 BRPM | TEMPERATURE: 98.9 F | HEIGHT: 70 IN | OXYGEN SATURATION: 92 % | BODY MASS INDEX: 33.27 KG/M2 | HEART RATE: 104 BPM | DIASTOLIC BLOOD PRESSURE: 86 MMHG | WEIGHT: 232.38 LBS

## 2019-05-02 LAB
ANION GAP SERPL CALCULATED.3IONS-SCNC: 10.5 MMOL/L
BUN BLD-MCNC: 3 MG/DL (ref 8–23)
BUN/CREAT SERPL: 5.3 (ref 7–25)
CALCIUM SPEC-SCNC: 8.3 MG/DL (ref 8.6–10.5)
CHLORIDE SERPL-SCNC: 85 MMOL/L (ref 98–107)
CO2 SERPL-SCNC: 33.5 MMOL/L (ref 22–29)
CREAT BLD-MCNC: 0.57 MG/DL (ref 0.57–1)
GFR SERPL CREATININE-BSD FRML MDRD: 108 ML/MIN/1.73
GLUCOSE BLD-MCNC: 157 MG/DL (ref 65–99)
POTASSIUM BLD-SCNC: 3.7 MMOL/L (ref 3.5–5.2)
POTASSIUM BLD-SCNC: 3.8 MMOL/L (ref 3.5–5.2)
SODIUM BLD-SCNC: 129 MMOL/L (ref 136–145)

## 2019-05-02 PROCEDURE — 80048 BASIC METABOLIC PNL TOTAL CA: CPT | Performed by: SURGERY

## 2019-05-02 PROCEDURE — 99024 POSTOP FOLLOW-UP VISIT: CPT | Performed by: SURGERY

## 2019-05-02 PROCEDURE — 94799 UNLISTED PULMONARY SVC/PX: CPT

## 2019-05-02 PROCEDURE — 25010000002 ENOXAPARIN PER 10 MG: Performed by: SURGERY

## 2019-05-02 RX ORDER — OXYCODONE HYDROCHLORIDE AND ACETAMINOPHEN 5; 325 MG/1; MG/1
1-2 TABLET ORAL EVERY 4 HOURS PRN
Qty: 10 TABLET | Refills: 0 | Status: ON HOLD | OUTPATIENT
Start: 2019-05-02 | End: 2019-06-24

## 2019-05-02 RX ORDER — OXYCODONE AND ACETAMINOPHEN 7.5; 325 MG/1; MG/1
1 TABLET ORAL ONCE AS NEEDED
Status: DISCONTINUED | OUTPATIENT
Start: 2019-05-02 | End: 2019-05-02 | Stop reason: HOSPADM

## 2019-05-02 RX ADMIN — SODIUM CHLORIDE 100 ML/HR: 9 INJECTION, SOLUTION INTRAVENOUS at 02:54

## 2019-05-02 RX ADMIN — PANTOPRAZOLE SODIUM 40 MG: 40 TABLET, DELAYED RELEASE ORAL at 09:14

## 2019-05-02 RX ADMIN — OXYCODONE HYDROCHLORIDE AND ACETAMINOPHEN 1 TABLET: 10; 325 TABLET ORAL at 02:54

## 2019-05-02 RX ADMIN — BUSPIRONE HYDROCHLORIDE 5 MG: 5 TABLET ORAL at 09:13

## 2019-05-02 RX ADMIN — FLUOXETINE HYDROCHLORIDE 10 MG: 10 CAPSULE ORAL at 09:13

## 2019-05-02 RX ADMIN — ENOXAPARIN SODIUM 40 MG: 40 INJECTION SUBCUTANEOUS at 09:13

## 2019-05-02 RX ADMIN — POTASSIUM CHLORIDE 40 MEQ: 1500 TABLET, EXTENDED RELEASE ORAL at 05:41

## 2019-05-02 RX ADMIN — POTASSIUM CHLORIDE 40 MEQ: 1500 TABLET, EXTENDED RELEASE ORAL at 01:35

## 2019-05-02 RX ADMIN — SUCRALFATE 1 G: 1 TABLET ORAL at 09:13

## 2019-05-02 RX ADMIN — LINAGLIPTIN 5 MG: 5 TABLET, FILM COATED ORAL at 09:13

## 2019-05-02 RX ADMIN — AMLODIPINE BESYLATE 5 MG: 5 TABLET ORAL at 09:13

## 2019-05-02 RX ADMIN — METFORMIN HYDROCHLORIDE 1000 MG: 500 TABLET ORAL at 09:13

## 2019-05-02 RX ADMIN — CHLORTHALIDONE 25 MG: 50 TABLET ORAL at 09:13

## 2019-05-02 NOTE — DISCHARGE SUMMARY
Date of Discharge:  5/2/2019    Discharge Diagnosis: Paraesophageal hernia    Presenting Problem/History of Present Illness  Gastroesophageal reflux disease, esophagitis presence not specified [K21.9]  Paraesophageal hernia [K44.9]       Hospital Course  Patient is a 63-year-old white female with a large paraesophageal hernia.  She is probably and underwent laparoscopy repair of a colon enterotomy.  Since she had enterotomy in her: I need to use mesh to fix the hernia I did not proceed with fixing her paraesophageal hernia.  She was discharged home postoperative day #3    Procedures Performed  Procedure(s):  Lysis of adhesions repair of colon enterotomy       Consults:  Consults     No orders found from 3/31/2019 to 4/30/2019.          Condition on Discharge:  Stable    Vital Signs  Temp:  [98.3 °F (36.8 °C)-99.4 °F (37.4 °C)] 99 °F (37.2 °C)  Heart Rate:  [] 92  Resp:  [16-20] 20  BP: (125-177)/(66-78) 177/78    Physical Exam:  Physical Exam:  General :  patient is a 63 y.o. female in no acute distress.    HEENT :  normocephalic, pupils equally round and reactive to light, extraocular motions                   intact.                       Chest:       clear bilaterally.  Equal breath sounds.  No rales or rhonchi    Heart:        regular rate and rhythm.    Abdomen: Appropriately tender    :            normal external genitalia    Rectal:       not performed    Extremities: no clubbing cyanosis or edema.  Good femoral, popliteal, dorsalis pedis                           and posterior tibial pulse.    Neurologic: patient oriented ×3.  Cranial  nerves grossly intact.  No sensory, cellebellar,                     or motor dysfunction.      Discharge Disposition  [place] [facility] [place]    Discharge Medications     Discharge Medications      New Medications      Instructions Start Date   oxyCODONE-acetaminophen 5-325 MG per tablet  Commonly known as:  PERCOCET   1-2 tablets, Oral, Every 4 Hours PRN          Continue These Medications      Instructions Start Date   albuterol sulfate  (90 Base) MCG/ACT inhaler  Commonly known as:  PROVENTIL HFA;VENTOLIN HFA;PROAIR HFA   2 puffs, Inhalation, Every 4 Hours PRN      amLODIPine 5 MG tablet  Commonly known as:  NORVASC   5 mg, Oral, Daily      busPIRone 5 MG tablet  Commonly known as:  BUSPAR   5 mg, Oral, 2 Times Daily      cetirizine 10 MG tablet  Commonly known as:  zyrTEC   10 mg, Oral, Nightly      chlorthalidone 25 MG tablet  Commonly known as:  HYGROTON   25 mg, Oral, Daily      FLUoxetine 10 MG capsule  Commonly known as:  PROzac   10 mg, Oral, Daily      gabapentin 600 MG tablet  Commonly known as:  NEURONTIN   600 mg, Oral, 3 Times Daily      metFORMIN 1000 MG tablet  Commonly known as:  GLUCOPHAGE   1,000 mg, Oral, 2 Times Daily With Meals      montelukast 10 MG tablet  Commonly known as:  SINGULAIR   10 mg, Oral, Nightly      OXcarbazepine 600 MG tablet  Commonly known as:  TRILEPTAL   600 mg, Oral, 2 Times Daily      oxyCODONE 10 MG tablet  Commonly known as:  ROXICODONE   10 mg, Oral, Every 6 Hours PRN      pantoprazole 40 MG EC tablet  Commonly known as:  PROTONIX   40 mg, Oral, 2 Times Daily      polyethylene glycol packet  Commonly known as:  MIRALAX   17 g, Oral, Daily      SITagliptin 50 MG tablet  Commonly known as:  JANUVIA   50 mg, Oral, 2 Times Daily      sucralfate 1 g tablet  Commonly known as:  CARAFATE   1 g, Oral, 4 Times Daily      traZODone 100 MG tablet  Commonly known as:  DESYREL   100 mg, Oral, Nightly             Discharge Disposition  Patient is discharged home.  She is to return the office in 1 week       Lukas Nelson MD  05/02/19  1:21 PM            Dragon disclaimer:  Much of this encounter note is an electronic transcription/translation of spoken language to printed text. The electronic translation of spoken language may permit erroneous, or at times, nonsensical words or phrases to be inadvertently transcribed; Although I  have reviewed the note for such errors, some may still exist.

## 2019-05-02 NOTE — DISCHARGE PLACEMENT REQUEST
"Chika Goldstein (63 y.o. Female)     Date of Birth Social Security Number Address Home Phone MRN    1955  [address]  [facility] [address] [phone] [id number]    Sikh Marital Status          None        Admission Date Admission Type Admitting Provider Attending Provider Department, Room/Bed    4/29/19 Elective Lukas Nelson MD Brown, Donald E, MD [address], [address]/    Discharge Date Discharge Disposition Discharge Destination         [place] [facility] [place]              Attending Provider:  Lukas Nelson MD    Allergies:  Zithromax [Azithromycin], Amoxicillin, Codeine, Penicillins, Phenobarbital    Isolation:  None   Infection:  None   Code Status:  CPR    Ht:  177.8 cm (70\")   Wt:  105 kg (232 lb 6 oz)    Admission Cmt:  None   Principal Problem:  Gastroesophageal reflux disease [K21.9]                 Active Insurance as of 4/29/2019     Primary Coverage     Payor Plan Insurance Group Employer/Plan Group    MEDICARE MEDICARE A & B      Payor Plan Address Payor Plan Phone Number Payor Plan Fax Number Effective Dates    [address] [phone]  4/1/2008 - None Entered    [facility] [id number]       Subscriber Name Subscriber Birth Date Member ID       CHIKA GOLDSTEIN 1955 [id number]           Secondary Coverage     Payor Plan Insurance Group Employer/Plan Group    [place]      Payor Plan Address Payor Plan Phone Number Payor Plan Fax Number Effective Dates    [address] [phone]  10/3/2016 - None Entered    [facility] [place] [address]       Subscriber Name Subscriber Birth Date Member ID       CHIKA GOLDSTEIN 1955 [id number]                 Emergency Contacts      (Rel.) Home Phone Work Phone Mobile Phone    PRIYA GOLDSTEIN (Son) [phone] -- [phone]    Larisa Denson (Sister) -- -- [phone]        [address] [address]  Phone:  [phone]  Fax:   Date: May 2, 2019    "   Ambulatory Referral to Home Health     Patient:  Mary Kay Goldstein MRN:  [id number]   [address]  [place] [address] :  1955  SSN:    Phone: [phone] Sex:  F      INSURANCE PAYOR PLAN GROUP # SUBSCRIBER ID   Primary:  Secondary:    [place]ARE  [place] [id number]  [id number]      [id number]  [id number]      Referring Provider Information:  MARK NELSON Phone: [phone] Fax:       Referral Information:   # Visits:  1 Referral Type: Home Health [42]   Urgency:  Routine Referral Reason: Specialty Services Required   Start Date: May 2, 2019 End Date:  To be determined by Insurer   Diagnosis: Gastroesophageal reflux disease, esophagitis presence not specified (K21.9 [ICD-10-CM] 530.81 [ICD-9-CM])      Refer to Dept: [facility]  Refer to Provider:   Refer to Facility:       Face to Face Visit Date: 2019  Follow-up Provider for Plan of Care? I treated the patient in an acute care facility and will not continue treatment after discharge.  Follow-up Provider: MARK NAILS [[id number]]  Reason/Clinical Findings: Weakness  Describe mobility limitations that make leaving home difficult: Weakness  Nursing/Therapeutic Services Requested: Physical Therapy  Nursing/Therapeutic Services Requested: Skilled Nursing  Skilled nursing orders: Medication education  PT orders: Strengthening  Frequency: 1 Week 1     This document serves as a request of services and does not constitute Insurance authorization or approval of services.  To determine eligibility, please contact the members Insurance carrier to verify and review coverage.     If you have medical questions regarding this request for services. Please contact [address] at [phone] between the hours of 8:00am - 5:00pm (Mon-Fri).       Verbal Order Mode: Telephone with readback   Authorizing Provider: Mark Nelson MD  Authorizing Provider's NPI: [id number]     Order Entered By: Letitia Ty RN  5/2/2019  2:17 PM     Electronically signed by:                 History & Physical      Lukas Nelson MD at 4/29/2019  7:26 AM          H&P reviewed. The patient was examined and there are no changes to the H&P.          Electronically signed by Lukas Nelson MD at 4/29/2019  7:26 AM   Source Note             Mary Kay Goldstein  [address]  [place] [address]  1955  [phone] (home)           Chief Complaint   Patient presents with   • Follow-up       F/U EMS,BARIUM SWALLOW,UGI         HPI  Patient is a 63-year-old white female who has a very large paraesophageal hernia.  Quite symptomatic from this and desires surgical intervention.  Her EMS is normal.  Barium swallow shows a third of her stomach and her chest.     Review of Systems     The Past medical history, family history, social history, medication list, allergies, and Review of Systems has been reviewed and confirmed.        General: negative  Integumentary: negative  Eyes: Blind  ENT: hearing loss  Respiratory: negative  Gastrointestinal: nausea/vomiting, diarrhea and abdominal pain  Cardiovascular: negative  Neurological: dizziness  Psychiatric: anxiety  Hematologic/Lymphatic: negative  Genitourinary: negative  Musculoskeletal: back pain  Endocrine: negative  Breasts: negative            Patient Active Problem List   Diagnosis   • Epilepsy (CMS/HCC)   • Guillain Barré syndrome (CMS/HCC)   • Nausea and vomiting   • Hematochezia   • Diarrhea   • Hiatal hernia   • History of stomach ulcers   • Paraesophageal hernia   • Epigastric pain   • Internal hemorrhoids   • Gastroesophageal reflux disease            Medical History        Past Medical History:   Diagnosis Date   • Arthritis     • Asthma     • Blind     • Diabetes mellitus (CMS/HCC)     • Elevated cholesterol     • GERD (gastroesophageal reflux disease)     • Hypertension     • Low back pain     • Seizures (CMS/HCC)     • Stomach ulcer                 Surgical History         Past Surgical  History:   Procedure Laterality Date   • APPENDECTOMY       • BREAST SURGERY         reduction   • CHOLECYSTECTOMY       • COLONOSCOPY         about 7 years ago   • COLONOSCOPY N/A 2/8/2019     Procedure: COLONOSCOPY;  Surgeon: Lukas Nelson MD;  Location:  [facility];  Service: Gastroenterology   • ENDOSCOPY N/A 2/8/2019     Procedure: ESOPHAGOGASTRODUODENOSCOPY;  Surgeon: Lukas Nelson MD;  Location:  [facility];  Service: Gastroenterology   • EYE SURGERY       • HYSTERECTOMY                         Family History   Problem Relation Age of Onset   • Cancer Mother     • Diabetes Mother     • Hypertension Mother     • Hypertension Father     • Heart disease Father              Social History           Tobacco Use   • Smoking status: Never Smoker   • Smokeless tobacco: Never Used   Substance Use Topics   • Alcohol use: No   • Drug use: No         Current Medications          Current Outpatient Medications   Medication Sig Dispense Refill   • albuterol (PROVENTIL HFA;VENTOLIN HFA) 108 (90 BASE) MCG/ACT inhaler Inhale 2 puffs Every 4 (Four) Hours As Needed for Wheezing or Shortness of Air.       • amLODIPine (NORVASC) 5 MG tablet Take 5 mg by mouth Daily.       • cetirizine (zyrTEC) 10 MG tablet Take 10 mg by mouth Every Night.       • chlorthalidone (HYGROTON) 25 MG tablet Take 25 mg by mouth Daily.       • FLUoxetine (PROzac) 20 MG capsule Take 20 mg by mouth Daily.       • linaclotide (LINZESS) 72 MCG capsule capsule Take 72 mcg by mouth Every Morning Before Breakfast.       • montelukast (SINGULAIR) 10 MG tablet Take 10 mg by mouth every night.       • ondansetron ODT (ZOFRAN ODT) 4 MG disintegrating tablet Take 1 tablet by mouth Every 8 (Eight) Hours As Needed for Nausea or Vomiting. 20 tablet 0   • OXcarbazepine (TRILEPTAL) 600 MG tablet Take 600 mg by mouth 2 (Two) Times a Day.       • oxyCODONE (ROXICODONE) 10 MG tablet Take 10 mg by mouth Every 6 (Six) Hours As Needed for Severe Pain .       • pantoprazole  "(PROTONIX) 40 MG EC tablet Take 40 mg by mouth 2 (Two) Times a Day.       • polyethylene glycol (MIRALAX) packet Take 17 g by mouth Daily.       • SITagliptin (JANUVIA) 50 MG tablet Take 50 mg by mouth Daily.       • sucralfate (CARAFATE) 1 g tablet Take 1 g by mouth 4 (Four) Times a Day.       • traZODone (DESYREL) 50 MG tablet Take 50 mg by mouth Every Night.          No current facility-administered medications for this visit.                Allergies  Zithromax [azithromycin]; Amoxicillin; Codeine; Penicillins; and Phenobarbital     /88   Pulse 78   Temp 98.3 °F (36.8 °C) (Oral)   Resp 17   Ht 177.8 cm (70\")   Wt 100 kg (221 lb)   SpO2 99%   BMI 31.71 kg/m²       Physical Exam     General :         Patient is a 63 y.o. female in no acute distress.     HEENT:           Normocephalic, pupils equally round and reactive to light, extraocular motions intact.  Chest:  Clear bilaterally. Equal breath sounds. No rales or rhonchi.  Heart:              Regular rate and rhythm.  Abdomen:        Soft, nontender. No distention.  Lower midline surgical scar from previous hysterectomy  :      Normal external genitalia.  Rectal:             Not performed.  Extremities:     No clubbing cyanosis or edema. Good femoral, popliteal, dorsalis pedis and posterior tibial pulse.  Neurologic:      Patient oriented ×3. Cranial nerves grossly intact. No sensory,cellebellar, or motor dysfunction.                    Assessment/Plan       ASSESSMENT  Paraesophageal hernia           PLAN     Laparoscopic repair of paraesophageal hernia with patch and Nissen fundoplication.     Risks, benefits, and possible complications discussed in great detail with the patient and son.  Both of which she explains the operation has been given to the patient.  List of complications have been read over with the patient and son.  They understand and agreed to the outlined plan.  Also discussed possibility of pneumothorax.                " Electronically signed by Lukas Nelson MD at 4/28/2019  4:44 PM             Lukas Nelson MD at 4/28/2019  4:43 PM          Mary Kay Goldstein  [address]  [facility] [address]  1955  [phone] (home)           Chief Complaint   Patient presents with   • Follow-up       F/U EMS,BARIUM SWALLOW,UGI         HPI  Patient is a 63-year-old white female who has a very large paraesophageal hernia.  Quite symptomatic from this and desires surgical intervention.  Her EMS is normal.  Barium swallow shows a third of her stomach and her chest.     Review of Systems     The Past medical history, family history, social history, medication list, allergies, and Review of Systems has been reviewed and confirmed.        General: negative  Integumentary: negative  Eyes: Blind  ENT: hearing loss  Respiratory: negative  Gastrointestinal: nausea/vomiting, diarrhea and abdominal pain  Cardiovascular: negative  Neurological: dizziness  Psychiatric: anxiety  Hematologic/Lymphatic: negative  Genitourinary: negative  Musculoskeletal: back pain  Endocrine: negative  Breasts: negative            Patient Active Problem List   Diagnosis   • Epilepsy (CMS/HCC)   • Guillain Barré syndrome (CMS/HCC)   • Nausea and vomiting   • Hematochezia   • Diarrhea   • Hiatal hernia   • History of stomach ulcers   • Paraesophageal hernia   • Epigastric pain   • Internal hemorrhoids   • Gastroesophageal reflux disease            Medical History        Past Medical History:   Diagnosis Date   • Arthritis     • Asthma     • Blind     • Diabetes mellitus (CMS/HCC)     • Elevated cholesterol     • GERD (gastroesophageal reflux disease)     • Hypertension     • Low back pain     • Seizures (CMS/HCC)     • Stomach ulcer                 Surgical History         Past Surgical History:   Procedure Laterality Date   • APPENDECTOMY       • BREAST SURGERY         reduction   • CHOLECYSTECTOMY       • COLONOSCOPY         about 7 years ago   • COLONOSCOPY N/A 2/8/2019      Procedure: COLONOSCOPY;  Surgeon: Lukas Nelson MD;  Location:  [facility];  Service: Gastroenterology   • ENDOSCOPY N/A 2/8/2019     Procedure: ESOPHAGOGASTRODUODENOSCOPY;  Surgeon: Lukas Nelson MD;  [facility]:  [facility];  Service: Gastroenterology   • EYE SURGERY       • HYSTERECTOMY                         Family History   Problem Relation Age of Onset   • Cancer Mother     • Diabetes Mother     • Hypertension Mother     • Hypertension Father     • Heart disease Father              Social History           Tobacco Use   • Smoking status: Never Smoker   • Smokeless tobacco: Never Used   Substance Use Topics   • Alcohol use: No   • Drug use: No         Current Medications          Current Outpatient Medications   Medication Sig Dispense Refill   • albuterol (PROVENTIL HFA;VENTOLIN HFA) 108 (90 BASE) MCG/ACT inhaler Inhale 2 puffs Every 4 (Four) Hours As Needed for Wheezing or Shortness of Air.       • amLODIPine (NORVASC) 5 MG tablet Take 5 mg by mouth Daily.       • cetirizine (zyrTEC) 10 MG tablet Take 10 mg by mouth Every Night.       • chlorthalidone (HYGROTON) 25 MG tablet Take 25 mg by mouth Daily.       • FLUoxetine (PROzac) 20 MG capsule Take 20 mg by mouth Daily.       • linaclotide (LINZESS) 72 MCG capsule capsule Take 72 mcg by mouth Every Morning Before Breakfast.       • montelukast (SINGULAIR) 10 MG tablet Take 10 mg by mouth every night.       • ondansetron ODT (ZOFRAN ODT) 4 MG disintegrating tablet Take 1 tablet by mouth Every 8 (Eight) Hours As Needed for Nausea or Vomiting. 20 tablet 0   • OXcarbazepine (TRILEPTAL) 600 MG tablet Take 600 mg by mouth 2 (Two) Times a Day.       • oxyCODONE (ROXICODONE) 10 MG tablet Take 10 mg by mouth Every 6 (Six) Hours As Needed for Severe Pain .       • pantoprazole (PROTONIX) 40 MG EC tablet Take 40 mg by mouth 2 (Two) Times a Day.       • polyethylene glycol (MIRALAX) packet Take 17 g by mouth Daily.       • SITagliptin (JANUVIA) 50 MG tablet Take 50  "mg by mouth Daily.       • sucralfate (CARAFATE) 1 g tablet Take 1 g by mouth 4 (Four) Times a Day.       • traZODone (DESYREL) 50 MG tablet Take 50 mg by mouth Every Night.          No current facility-administered medications for this visit.                Allergies  Zithromax [azithromycin]; Amoxicillin; Codeine; Penicillins; and Phenobarbital     /88   Pulse 78   Temp 98.3 °F (36.8 °C) (Oral)   Resp 17   Ht 177.8 cm (70\")   Wt 100 kg (221 lb)   SpO2 99%   BMI 31.71 kg/m²       Physical Exam     General :         Patient is a 63 y.o. female in no acute distress.     HEENT:           Normocephalic, pupils equally round and reactive to light, extraocular motions intact.  Chest:  Clear bilaterally. Equal breath sounds. No rales or rhonchi.  Heart:              Regular rate and rhythm.  Abdomen:        Soft, nontender. No distention.  Lower midline surgical scar from previous hysterectomy  :      Normal external genitalia.  Rectal:             Not performed.  Extremities:     No clubbing cyanosis or edema. Good femoral, popliteal, dorsalis pedis and posterior tibial pulse.  Neurologic:      Patient oriented ×3. Cranial nerves grossly intact. No sensory,cellebellar, or motor dysfunction.                    Assessment/Plan       ASSESSMENT  Paraesophageal hernia           PLAN     Laparoscopic repair of paraesophageal hernia with patch and Nissen fundoplication.     Risks, benefits, and possible complications discussed in great detail with the patient and son.  Both of which she explains the operation has been given to the patient.  List of complications have been read over with the patient and son.  They understand and agreed to the outlined plan.  Also discussed possibility of pneumothorax.               Electronically signed by Lukas Nelson MD at 4/28/2019  4:44 PM       ICU Vital Signs     Row Name 05/02/19 1404 05/02/19 1100 05/02/19 0913 05/02/19 0707 05/02/19 0612       Vitals    Temp  --  99 " °F (37.2 °C)  --  --  98.5 °F (36.9 °C)    Temp src  --  Oral  --  --  Oral    Pulse  --  92  --  85  83    Heart Rate Source  --  Monitor  --  --  Monitor    Resp  --  20  --  16  18    Resp Rate Source  --  Visual  --  --  Visual    BP  --  177/78  --  --  125/67    BP Location  --  Right arm  --  --  Right arm    BP Method  --  Automatic  --  --  Automatic    Patient Position  --  Sitting  --  --  Lying       Oxygen Therapy    SpO2  94 %  95 %  --  94 %  90 %    Pulse Oximetry Type  --  Intermittent  --  --  Intermittent    Device (Oxygen Therapy)  room air  nasal cannula  nasal cannula  other (see comments) Home CPAP  CPAP    Flow (L/min)  --  --  2  --  --    Row Name 05/01/19 2300 05/01/19 2123 05/01/19 1909 05/01/19 1815 05/01/19 1511       Vitals    Temp  98.9 °F (37.2 °C)  --  --  99.4 °F (37.4 °C)  98.3 °F (36.8 °C)    Temp src  Oral  --  --  Oral  Oral    Pulse  100  --  93  94  91    Heart Rate Source  Monitor  --  Monitor  Monitor  Monitor    Resp  16  --  16  16  18    Resp Rate Source  Visual  --  Visual  Visual  Visual    BP  136/66  --  --  151/71  151/67    BP Location  Left arm  --  --  Left arm  Right arm    BP Method  Automatic  --  --  Automatic  Automatic    Patient Position  Lying  --  --  Lying  --       Oxygen Therapy    SpO2  91 %  --  90 %  91 %  --    Device (Oxygen Therapy)  room air  nasal cannula  room air  room air  --    Flow (L/min)  --  2  --  --  --        Intake & Output (last day)       05/01 0701 - 05/02 0700 05/02 0701 - 05/03 0700    P.O. 720 360    I.V. (mL/kg) 2000 (19)     Total Intake(mL/kg) 2720 (25.9) 360 (3.4)    Net +2720 +360          Urine Unmeasured Occurrence 8 x 2 x    Stool Unmeasured Occurrence 2 x         Hospital Medications (active)       Dose Frequency Start End    albuterol (PROVENTIL) nebulizer solution 0.083% 2.5 mg/3mL 2.5 mg Every 4 Hours PRN 4/29/2019     Sig - Route: Take 2.5 mg by nebulization Every 4 (Four) Hours As Needed for Wheezing or  "Shortness of Air. - Nebulization    amLODIPine (NORVASC) tablet 5 mg 5 mg Daily 4/29/2019     Sig - Route: Take 1 tablet by mouth Daily. - Oral    busPIRone (BUSPAR) tablet 5 mg 5 mg 2 Times Daily 4/29/2019     Sig - Route: Take 1 tablet by mouth 2 (Two) Times a Day. - Oral    cetirizine (zyrTEC) tablet 10 mg 10 mg Nightly 4/29/2019     Sig - Route: Take 1 tablet by mouth Every Night. - Oral    chlorthalidone (HYGROTEN) tablet 25 mg 25 mg Daily 4/30/2019     Sig - Route: Take 0.5 tablets by mouth Daily. - Oral    enoxaparin (LOVENOX) syringe 40 mg 40 mg Daily 4/30/2019     Sig - Route: Inject 0.4 mL under the skin into the appropriate area as directed Daily. - Subcutaneous    FLUoxetine (PROzac) capsule 10 mg 10 mg Daily 4/29/2019     Sig - Route: Take 1 capsule by mouth Daily. - Oral    gabapentin (NEURONTIN) capsule 600 mg 600 mg Nightly 5/1/2019     Sig - Route: Take 2 capsules by mouth Every Night. - Oral    linagliptin (TRADJENTA) tablet 5 mg 5 mg Daily 4/29/2019     Sig - Route: Take 1 tablet by mouth Daily. - Oral    metFORMIN (GLUCOPHAGE) tablet 1,000 mg 1,000 mg 2 Times Daily With Meals 4/29/2019     Sig - Route: Take 2 tablets by mouth 2 (Two) Times a Day With Meals. - Oral    montelukast (SINGULAIR) tablet 10 mg 10 mg Nightly 4/29/2019     Sig - Route: Take 1 tablet by mouth Every Night. - Oral    morphine injection 6 mg 6 mg Every 2 Hours PRN 4/29/2019 5/9/2019    Sig - Route: Infuse 0.75 mL into a venous catheter Every 2 (Two) Hours As Needed for Severe Pain . - Intravenous    Linked Group 1:  \"And\" Linked Group Details        naloxone (NARCAN) injection 0.4 mg 0.4 mg Every 5 Minutes PRN 4/29/2019     Sig - Route: Infuse 1 mL into a venous catheter Every 5 (Five) Minutes As Needed for Respiratory Depression. - Intravenous    Linked Group 1:  \"And\" Linked Group Details        ondansetron (ZOFRAN) injection 4 mg 4 mg Every 6 Hours PRN 4/29/2019     Sig - Route: Infuse 2 mL into a venous catheter Every 6 " "(Six) Hours As Needed for Nausea or Vomiting. - Intravenous    Linked Group 2:  \"Or\" Linked Group Details        ondansetron (ZOFRAN) tablet 4 mg 4 mg Every 6 Hours PRN 4/29/2019     Sig - Route: Take 1 tablet by mouth Every 6 (Six) Hours As Needed for Nausea or Vomiting. - Oral    Linked Group 2:  \"Or\" Linked Group Details        OXcarbazepine (TRILEPTAL) tablet 1,200 mg 1,200 mg Nightly 5/2/2019     Sig - Route: Take 4 tablets by mouth Every Night. - Oral    OXcarbazepine (TRILEPTAL) tablet 600 mg 600 mg Nightly 5/1/2019 5/1/2019    Sig - Route: Take 2 tablets by mouth Every Night. - Oral    oxyCODONE-acetaminophen (PERCOCET)  MG per tablet 1 tablet 1 tablet Every 4 Hours PRN 4/29/2019 5/9/2019    Sig - Route: Take 1 tablet by mouth Every 4 (Four) Hours As Needed for Severe Pain . - Oral    oxyCODONE-acetaminophen (PERCOCET) 7.5-325 MG per tablet 1 tablet 1 tablet Once As Needed 5/2/2019 5/12/2019    Sig - Route: Take 1 tablet by mouth 1 (One) Time As Needed for Moderate Pain . - Oral    pantoprazole (PROTONIX) EC tablet 40 mg 40 mg 2 Times Daily Before Meals 4/29/2019     Sig - Route: Take 1 tablet by mouth 2 (Two) Times a Day Before Meals. - Oral    polyethylene glycol (MIRALAX) powder 17 g 17 g Daily 4/29/2019     Sig - Route: Take 17 g by mouth Daily. - Oral    potassium chloride (K-DUR,KLOR-CON) CR tablet 40 mEq 40 mEq As Needed 5/1/2019     Sig - Route: Take 2 tablets by mouth As Needed (Potassium Replacement.  See Admin Instructions). - Oral    Linked Group 3:  \"Or\" Linked Group Details        potassium chloride (K-DUR,KLOR-CON) CR tablet 40 mEq 40 mEq Every 4 Hours 5/1/2019 5/2/2019    Sig - Route: Take 2 tablets by mouth Every 4 (Four) Hours. - Oral    potassium chloride (KLOR-CON) packet 40 mEq 40 mEq As Needed 5/1/2019     Sig - Route: Take 40 mEq by mouth As Needed (potassium replacement, see admin instructions). - Oral    Linked Group 3:  \"Or\" Linked Group Details        potassium chloride 10 " "mEq in 100 mL IVPB 10 mEq Every 1 Hour PRN 5/1/2019     Sig - Route: Infuse 100 mL into a venous catheter Every 1 (One) Hour As Needed (Potassium Replacement - See Admin Instructions). - Intravenous    Linked Group 3:  \"Or\" Linked Group Details        sodium chloride 0.9 % infusion 100 mL/hr Continuous 4/29/2019     Sig - Route: Infuse 100 mL/hr into a venous catheter Continuous. - Intravenous    sucralfate (CARAFATE) tablet 1 g 1 g 4 Times Daily 4/29/2019     Sig - Route: Take 1 tablet by mouth 4 (Four) Times a Day. - Oral    traZODone (DESYREL) tablet 100 mg 100 mg Nightly 4/29/2019     Sig - Route: Take 2 tablets by mouth Every Night. - Oral    gabapentin (NEURONTIN) capsule 600 mg (Discontinued) 600 mg Every 8 Hours Scheduled 4/29/2019 5/1/2019    Sig - Route: Take 2 capsules by mouth Every 8 (Eight) Hours. - Oral    OXcarbazepine (TRILEPTAL) tablet 600 mg (Discontinued) 600 mg Every 12 Hours Scheduled 4/29/2019 5/1/2019    Sig - Route: Take 2 tablets by mouth Every 12 (Twelve) Hours. - Oral    potassium chloride 10 mEq in 100 mL IVPB (Discontinued) 10 mEq Every 1 Hour 5/1/2019 5/1/2019    Sig - Route: Infuse 100 mL into a venous catheter Every 1 (One) Hour. - Intravenous    Reason for Discontinue: Formulary change            Lab Results (last 24 hours)     Procedure Component Value Units Date/Time    Basic Metabolic Panel [[id number]]  (Abnormal) Collected:  05/02/19 0939    Specimen:  Blood Updated:  05/02/19 1053     Glucose 157 mg/dL      BUN 3 mg/dL      Creatinine 0.57 mg/dL      Sodium 129 mmol/L      Potassium 3.8 mmol/L      Chloride 85 mmol/L      CO2 33.5 mmol/L      Calcium 8.3 mg/dL      eGFR Non African Amer 108 mL/min/1.73      BUN/Creatinine Ratio 5.3     Anion Gap 10.5 mmol/L     Narrative:       GFR Normal >60  Chronic Kidney Disease <60  Kidney Failure <15    Potassium [[id number]]  (Normal) Collected:  05/02/19 0939    Specimen:  Blood Updated:  05/02/19 1012     Potassium 3.7 mmol/L     " Potassium [[id number]]  (Abnormal) Collected:  05/01/19 1832    Specimen:  Blood Updated:  05/01/19 1917     Potassium 2.8 mmol/L         Imaging Results (last 24 hours)     ** No results found for the last 24 hours. **        Orders (last 24 hrs)     Start     Ordered    05/02/19 2100  OXcarbazepine (TRILEPTAL) tablet 1,200 mg  Nightly      05/01/19 1536    05/02/19 1400  oxyCODONE-acetaminophen (PERCOCET) 7.5-325 MG per tablet 1 tablet  Once As Needed      05/02/19 1320    05/02/19 1318  Discharge to care of  when patient meets criteria  Once      05/02/19 1320    05/02/19 1317  Discharge patient  Once      05/02/19 1320    05/02/19 1009  Basic Metabolic Panel  Once      05/02/19 1009    05/02/19 0945  Potassium  Timed      05/02/19 0542    05/02/19 0000  Discharge Follow-up with Specified Provider: Dr. Nelson; 1 Week      05/02/19 1320    05/02/19 0000  Discharge activity     Comments:  Normal activity    05/02/19 1320    05/02/19 0000  oxyCODONE-acetaminophen (PERCOCET) 5-325 MG per tablet  Every 4 Hours PRN      05/02/19 1320    05/02/19 0000  Ambulatory Referral to Home Health      05/02/19 1417    05/01/19 2200  potassium chloride (K-DUR,KLOR-CON) CR tablet 40 mEq  Every 4 Hours      05/01/19 2025    05/01/19 2100  gabapentin (NEURONTIN) capsule 600 mg  Nightly      05/01/19 1531    05/01/19 2100  OXcarbazepine (TRILEPTAL) tablet 600 mg  Nightly      05/01/19 1536    05/01/19 2100  potassium chloride 10 mEq in 100 mL IVPB  Every 1 Hour,   Status:  Discontinued      05/01/19 1946    05/01/19 1830  Potassium  Once      05/01/19 1454    05/01/19 0434  potassium chloride (K-DUR,KLOR-CON) CR tablet 40 mEq  As Needed      05/01/19 0435    05/01/19 0434  potassium chloride (KLOR-CON) packet 40 mEq  As Needed      05/01/19 0435    05/01/19 0434  potassium chloride 10 mEq in 100 mL IVPB  Every 1 Hour PRN      05/01/19 0435    04/30/19 0900  enoxaparin (LOVENOX) syringe 40 mg  Daily      04/29/19 0936     04/30/19 0900  chlorthalidone (HYGROTEN) tablet 25 mg  Daily      04/29/19 1223    04/29/19 2100  cetirizine (zyrTEC) tablet 10 mg  Nightly      04/29/19 1223    04/29/19 2100  montelukast (SINGULAIR) tablet 10 mg  Nightly      04/29/19 1223    04/29/19 2100  traZODone (DESYREL) tablet 100 mg  Nightly      04/29/19 1223    04/29/19 1800  metFORMIN (GLUCOPHAGE) tablet 1,000 mg  2 Times Daily With Meals      04/29/19 1223    04/29/19 1800  sucralfate (CARAFATE) tablet 1 g  4 Times Daily      04/29/19 1223    04/29/19 1730  pantoprazole (PROTONIX) EC tablet 40 mg  2 Times Daily Before Meals      04/29/19 1223    04/29/19 1400  amLODIPine (NORVASC) tablet 5 mg  Daily      04/29/19 1223    04/29/19 1400  busPIRone (BUSPAR) tablet 5 mg  2 Times Daily      04/29/19 1223    04/29/19 1400  FLUoxetine (PROzac) capsule 10 mg  Daily      04/29/19 1223    04/29/19 1400  gabapentin (NEURONTIN) capsule 600 mg  Every 8 Hours Scheduled,   Status:  Discontinued      04/29/19 1223    04/29/19 1400  OXcarbazepine (TRILEPTAL) tablet 600 mg  Every 12 Hours Scheduled,   Status:  Discontinued      04/29/19 1223    04/29/19 1400  polyethylene glycol (MIRALAX) powder 17 g  Daily      04/29/19 1223    04/29/19 1400  linagliptin (TRADJENTA) tablet 5 mg  Daily      04/29/19 1223    04/29/19 1222  albuterol (PROVENTIL) nebulizer solution 0.083% 2.5 mg/3mL  Every 4 Hours PRN      04/29/19 1223    04/29/19 0935  sodium chloride 0.9 % infusion  Continuous      04/29/19 0934    04/29/19 0931  ondansetron (ZOFRAN) tablet 4 mg  Every 6 Hours PRN      04/29/19 0934    04/29/19 0931  ondansetron (ZOFRAN) injection 4 mg  Every 6 Hours PRN      04/29/19 0934    04/29/19 0931  morphine injection 6 mg  Every 2 Hours PRN      04/29/19 0934    04/29/19 0931  naloxone (NARCAN) injection 0.4 mg  Every 5 Minutes PRN      04/29/19 0934    04/29/19 0931  oxyCODONE-acetaminophen (PERCOCET)  MG per tablet 1 tablet  Every 4 Hours PRN      04/29/19 0934     Unscheduled  Potassium  As Needed      05/01/19 0435    Unscheduled  Magnesium  As Needed      05/01/19 1420    Unscheduled  Potassium  As Needed      05/01/19 1420    --  chlorthalidone (HYGROTON) 25 MG tablet  Daily      04/29/19 0712    --  albuterol sulfate  (90 Base) MCG/ACT inhaler  Every 4 Hours PRN      04/29/19 0712    --  FLUoxetine (PROzac) 10 MG capsule  Daily      04/29/19 0949    Pending  montelukast (SINGULAIR) tablet 10 mg  Nightly      Pending    Pending  oxyCODONE (ROXICODONE) immediate release tablet 10 mg  Every 6 Hours PRN      Pending    Pending  amLODIPine (NORVASC) tablet 5 mg  Daily      Pending    Pending  cetirizine (zyrTEC) tablet 10 mg  Nightly      Pending    Pending  pantoprazole (PROTONIX) EC tablet 40 mg  2 Times Daily      Pending    Pending  OXcarbazepine (TRILEPTAL) tablet 600 mg  Every 12 Hours Scheduled      Pending    Pending  sucralfate (CARAFATE) tablet 1 g  4 Times Daily      Pending    Pending  polyethylene glycol (MIRALAX) powder 17 g  Daily      Pending    Pending  gabapentin (NEURONTIN) capsule 600 mg  Every 8 Hours Scheduled      Pending    Pending  traZODone (DESYREL) tablet 100 mg  Nightly      Pending    Pending  busPIRone (BUSPAR) tablet 5 mg  2 Times Daily      Pending    Pending  chlorthalidone (HYGROTEN) tablet 25 mg  Daily      Pending    Pending  FLUoxetine (PROzac) capsule 10 mg  Daily      Pending    Pending  albuterol (PROVENTIL) nebulizer solution 0.083% 2.5 mg/3mL  Every 4 Hours PRN      Pending    Pending  metFORMIN (GLUCOPHAGE) tablet 1,000 mg  2 Times Daily With Meals      Pending    --  SITagliptin (JANUVIA) 50 MG tablet  2 Times Daily      04/29/19 1137             Operative/Procedure Notes (most recent note)      Lukas Nelson MD at 4/29/2019  8:05 AM        Mary Kay Goldstein  4/29/2019      Operative Progress Note:    Surgeon and Assistant: Dr. Nelson    Pre-Operative Diagnosis: Paraesophageal hernia    Post-Operative Diagnosis: Paraesophageal  hernia    Procedure(s): Laparoscopic lysis of adhesions and repair of colon enterotomy    Type of Anesthesia Administered: General endotracheal anesthesia with infiltration Marcaine 0.5% with epinephrine    Estimated Blood Loss: Minimal    Blood Products: None    Specimen Obtained/Removed:None    Complication(s):: Enterotomy    Graft/Implant/Prosthetics/Implanted Device/Transplants: None    Indication: Patient is a 63-year-old white female with the above diagnosis    Findings: Extensive upper abdominal adhesions.  Dissection very difficult.  1 cm enterotomy created in the colon in spite of best efforts    Operative Report:  Patient was taken operating room laid in supine position on the operating table.  General endotracheal anesthesia was induced the abdomen prepped and draped in usual sterile fashion.  The patient was placed in a low lithotomy position.  5 mm Visiport was used to enter the abdomen in the left upper quadrant without difficulty.  The abdomen was insufflated CO2 to a maximum pressure 15 mmHg.  Examination showed massive adhesions up along the anterior abdominal wall.  This patient had given a history of a hysterectomy.  She had a lower midline incision which extended a little bit above the umbilicus.  Preoperatively I thought that this midline incision which had been extended up above the umbilicus was related to her hysterectomy.  However is quite obvious with all these adhesions that another procedure had been done in this area.  After dissecting the area it was apparent that this patient has had a hernia in this area which was repaired.  There is no history of this from the patient.  This is the reason there were so many adhesions in the location where we had to work.  I used a combination of 5 different trochars in different positions in order to accomplish the dissection.  It was very difficult and it was obvious that a enterotomy was made.  There was no stool spillage.  I continued with the  dissection in order to get the colon in a good position.  I then repaired it with 4 interrupted 0 Surgidek sutures.  I irrigated with copious amounts of saline.  I removed all the stating that could.  Hemostasis was excellent.  There was no spillage of stool.  Because of the extensive difficult dissection which had already been performed with this enterotomy, and the need to place a patch to repair this paraesophageal hernia, I elected to end the procedure at this point.  She will be returned in a few weeks for final repair of her paraesophageal hernia.  Patient tolerated procedure very well was returned to recovery room in satisfactory condition.      Electronically Signed by: Lukas Nelson MD        Dictated Utilizing Dragon Dictation      Electronically signed by Lukas Nelson MD at 4/29/2019  9:42 AM          Physician Progress Notes (most recent note)      Lukas Nelson MD at 5/1/2019  9:45 AM        Postop day #2    Patient feels okay.    Afebrile.  Pulse 102.    Wounds okay    White count up to 12,000.    Will keep for another day.    Electronically signed by Lukas Nelson MD at 5/1/2019  9:46 AM       Consult Notes (most recent note)     No notes of this type exist for this encounter.        Nutrition Notes (most recent note)     No notes of this type exist for this encounter.        Physical Therapy Notes (most recent note)     No notes of this type exist for this encounter.        Occupational Therapy Notes (most recent note)     No notes of this type exist for this encounter.        Speech Language Pathology Notes (most recent note)     No notes of this type exist for this encounter.           Respiratory Therapy Notes (most recent note)      Durga Moya RRT at 4/29/2019  2:03 PM        Family refused tx. Pt is sleeping and did not want me to wake pt.     Electronically signed by Durga Moya RRT at 4/29/2019  2:04 PM       ADL Documentation (most recent)      Most  Recent Value   Presence of Pain  denies pain/discomfort   Transferring  1 - assistive equipment   Toileting  1 - assistive equipment   Bathing  0 - independent   Dressing  0 - independent   Eating  0 - independent   Communication  2 - difficulty understanding (not related to language barrier)   Swallowing  0 - swallows foods/liquids without difficulty   Equipment Currently Used at Home  bipap/cpap, glucometer, hospital bed, rollator, shower chair             Discharge Summary      Lukas Nelson MD at 5/2/2019  1:21 PM            Date of Discharge:  5/2/2019    Discharge Diagnosis: Paraesophageal hernia    Presenting Problem/History of Present Illness  Gastroesophageal reflux disease, esophagitis presence not specified [K21.9]  Paraesophageal hernia [K44.9]       Hospital Course  Patient is a 63-year-old white female with a large paraesophageal hernia.  She is probably and underwent laparoscopy repair of a colon enterotomy.  Since she had enterotomy in her: I need to use mesh to fix the hernia I did not proceed with fixing her paraesophageal hernia.  She was discharged home postoperative day #3    Procedures Performed  Procedure(s):  Lysis of adhesions repair of colon enterotomy       Consults:  Consults     No orders found from 3/31/2019 to 4/30/2019.          Condition on Discharge:  Stable    Vital Signs  Temp:  [98.3 °F (36.8 °C)-99.4 °F (37.4 °C)] 99 °F (37.2 °C)  Heart Rate:  [] 92  Resp:  [16-20] 20  BP: (125-177)/(66-78) 177/78    Physical Exam:  Physical Exam:  General :  patient is a 63 y.o. female in no acute distress.    HEENT :  normocephalic, pupils equally round and reactive to light, extraocular motions                   intact.                       Chest:       clear bilaterally.  Equal breath sounds.  No rales or rhonchi    Heart:        regular rate and rhythm.    Abdomen: Appropriately tender    :            normal external genitalia    Rectal:       not performed    Extremities: no  clubbing cyanosis or edema.  Good femoral, popliteal, dorsalis pedis                           and posterior tibial pulse.    Neurologic: patient oriented ×3.  Cranial  nerves grossly intact.  No sensory, cellebellar,                     or motor dysfunction.      Discharge Disposition  [place]Health [place] [facility] [place]    Discharge Medications     Discharge Medications      New Medications      Instructions Start Date   oxyCODONE-acetaminophen 5-325 MG per tablet  Commonly known as:  PERCOCET   1-2 tablets, Oral, Every 4 Hours PRN         Continue These Medications      Instructions Start Date   albuterol sulfate  (90 Base) MCG/ACT inhaler  Commonly known as:  PROVENTIL HFA;VENTOLIN HFA;PROAIR HFA   2 puffs, Inhalation, Every 4 Hours PRN      amLODIPine 5 MG tablet  Commonly known as:  NORVASC   5 mg, Oral, Daily      busPIRone 5 MG tablet  Commonly known as:  BUSPAR   5 mg, Oral, 2 Times Daily      cetirizine 10 MG tablet  Commonly known as:  zyrTEC   10 mg, Oral, Nightly      chlorthalidone 25 MG tablet  Commonly known as:  HYGROTON   25 mg, Oral, Daily      FLUoxetine 10 MG capsule  Commonly known as:  PROzac   10 mg, Oral, Daily      gabapentin 600 MG tablet  Commonly known as:  NEURONTIN   600 mg, Oral, 3 Times Daily      metFORMIN 1000 MG tablet  Commonly known as:  GLUCOPHAGE   1,000 mg, Oral, 2 Times Daily With Meals      montelukast 10 MG tablet  Commonly known as:  SINGULAIR   10 mg, Oral, Nightly      OXcarbazepine 600 MG tablet  Commonly known as:  TRILEPTAL   600 mg, Oral, 2 Times Daily      oxyCODONE 10 MG tablet  Commonly known as:  ROXICODONE   10 mg, Oral, Every 6 Hours PRN      pantoprazole 40 MG EC tablet  Commonly known as:  PROTONIX   40 mg, Oral, 2 Times Daily      polyethylene glycol packet  Commonly known as:  MIRALAX   17 g, Oral, Daily      SITagliptin 50 MG tablet  Commonly known as:  JANUVIA   50 mg, Oral, 2 Times Daily      sucralfate 1 g tablet  Commonly known as:  CARAFATE   1 g, Oral, 4  Times Daily      traZODone 100 MG tablet  Commonly known as:  DESYREL   100 mg, Oral, Nightly             Discharge Disposition  Patient is discharged home.  She is to return the office in 1 week       Lukas Nelson MD  05/02/19  1:21 PM            Dragon disclaimer:  Much of this encounter note is an electronic transcription/translation of spoken language to printed text. The electronic translation of spoken language may permit erroneous, or at times, nonsensical words or phrases to be inadvertently transcribed; Although I have reviewed the note for such errors, some may still exist.    Electronically signed by Lukas Nelson MD at 5/2/2019  1:28 PM       Discharge Order (From admission, onward)    Start     Ordered    05/02/19 1317  Discharge patient  Once     Expected Discharge Date:  05/02/19    Discharge Disposition:  Home-Health Care [facility] [place]    Physician of Record for Attribution - Please select from Treatment Team:  LUKAS NELSON [[id number]]    Review needed by CMO to determine Physician of Record:  No       Question Answer Comment   Physician of Record for Attribution - Please select from Treatment Team LUKAS NELSON    Review needed by CMO to determine Physician of Record No        05/02/19 1320

## 2019-05-02 NOTE — PLAN OF CARE
Problem: Patient Care Overview  Goal: Plan of Care Review  Outcome: Ongoing (interventions implemented as appropriate)   05/02/19 1058   Coping/Psychosocial   Plan of Care Reviewed With patient;rani   Plan of Care Review   Progress improving     Goal: Individualization and Mutuality  Outcome: Ongoing (interventions implemented as appropriate)    Goal: Discharge Needs Assessment  Outcome: Ongoing (interventions implemented as appropriate)    Goal: Interprofessional Rounds/Family Conf  Outcome: Ongoing (interventions implemented as appropriate)      Problem: Fall Risk (Adult)  Goal: Identify Related Risk Factors and Signs and Symptoms  Outcome: Ongoing (interventions implemented as appropriate)   04/30/19 1513 05/02/19 1058   Fall Risk (Adult)   Related Risk Factors (Fall Risk) polypharmacy;sensory deficits;environment unfamiliar;gait/mobility problems --    Signs and Symptoms (Fall Risk) --  presence of risk factors     Goal: Absence of Fall  Outcome: Ongoing (interventions implemented as appropriate)   05/02/19 1058   Fall Risk (Adult)   Absence of Fall making progress toward outcome       Problem: Skin Injury Risk (Adult)  Goal: Identify Related Risk Factors and Signs and Symptoms  Outcome: Ongoing (interventions implemented as appropriate)   04/30/19 1513   Skin Injury Risk (Adult)   Related Risk Factors (Skin Injury Risk) mobility impaired;tissue perfusion altered;edema     Goal: Skin Health and Integrity  Outcome: Ongoing (interventions implemented as appropriate)   05/02/19 1058   Skin Injury Risk (Adult)   Skin Health and Integrity making progress toward outcome       Problem: Surgery Nonspecified (Adult)  Goal: Signs and Symptoms of Listed Potential Problems Will be Absent, Minimized or Managed (Surgery Nonspecified)  Outcome: Ongoing (interventions implemented as appropriate)   05/01/19 1021 05/02/19 1058   Goal/Outcome Evaluation   Problems Assessed (Surgery) --  all   Problems Present (Surgery) pain --         Problem: Pain, Acute (Adult)  Goal: Identify Related Risk Factors and Signs and Symptoms  Outcome: Ongoing (interventions implemented as appropriate)   04/30/19 1513   Pain, Acute (Adult)   Related Risk Factors (Acute Pain) knowledge deficit;patient perception   Signs and Symptoms (Acute Pain) facial mask of pain/grimace;fatigue/weakness;verbalization of pain descriptors     Goal: Acceptable Pain Control/Comfort Level  Outcome: Ongoing (interventions implemented as appropriate)   05/02/19 1058   Pain, Acute (Adult)   Acceptable Pain Control/Comfort Level making progress toward outcome

## 2019-05-02 NOTE — PLAN OF CARE
Problem: Patient Care Overview  Goal: Plan of Care Review  Outcome: Ongoing (interventions implemented as appropriate)   04/30/19 1513 05/01/19 2123   Coping/Psychosocial   Plan of Care Reviewed With --  patient;son   Plan of Care Review   Progress improving --        Problem: Fall Risk (Adult)  Goal: Absence of Fall  Outcome: Ongoing (interventions implemented as appropriate)   05/01/19 2342   Fall Risk (Adult)   Absence of Fall making progress toward outcome       Problem: Skin Injury Risk (Adult)  Goal: Skin Health and Integrity  Outcome: Ongoing (interventions implemented as appropriate)   05/01/19 2342   Skin Injury Risk (Adult)   Skin Health and Integrity making progress toward outcome       Problem: Pain, Acute (Adult)  Goal: Identify Related Risk Factors and Signs and Symptoms  Outcome: Ongoing (interventions implemented as appropriate)   04/30/19 1513   Pain, Acute (Adult)   Related Risk Factors (Acute Pain) knowledge deficit;patient perception   Signs and Symptoms (Acute Pain) facial mask of pain/grimace;fatigue/weakness;verbalization of pain descriptors     Goal: Acceptable Pain Control/Comfort Level  Outcome: Ongoing (interventions implemented as appropriate)   05/01/19 2342   Pain, Acute (Adult)   Acceptable Pain Control/Comfort Level making progress toward outcome

## 2019-05-02 NOTE — PROGRESS NOTES
Discharge Planning Assessment   [facility]     Patient Name: Mary Kay Goldstein  MRN: [id number]  Today's Date: 5/2/2019    Admit Date: 4/29/2019    Discharge Plan     Row Name 05/02/19 1403       Plan    Final Discharge Disposition Code  06 - home with home health care    Final Note Pt and family request home health services. SS received order for home health services. SS spoke to pt's family who is requesting [facility]. SS contacted [facility] per Yanna with referral. SS faxed referral to [facility] [phone]. Nurse to call report to [facility]. No other needs identified.         Home Medical Care - Selection Complete      Service Provider Request Status Selected Services Address Phone Number Fax Number    [facility] Selected Home Health Services [address] [place] [phone] --     Luisa Ellis

## 2019-05-03 ENCOUNTER — READMISSION MANAGEMENT (OUTPATIENT)
Dept: CALL CENTER | Facility: HOSPITAL | Age: 64
End: 2019-05-03

## 2019-05-03 NOTE — OUTREACH NOTE
Prep Survey      Responses   Facility patient discharged from?  [facility]   Is patient eligible?  Yes   Discharge diagnosis  Paraesophageal hernia, GERD with esophagitis, , s/p laparoscopic repair of colon enterotomy   Does the patient have one of the following disease processes/diagnoses(primary or secondary)?  General Surgery   Does the patient have Home health ordered?  Yes   What is the Home health agency?   [facility]    Is there a DME ordered?  No   Comments regarding appointments  See AVS   Prep survey completed?  Yes          Yuli White RN

## 2019-05-06 ENCOUNTER — READMISSION MANAGEMENT (OUTPATIENT)
Dept: CALL CENTER | Facility: HOSPITAL | Age: 64
End: 2019-05-06

## 2019-05-06 NOTE — OUTREACH NOTE
General Surgery Week 1 Survey      Responses   Facility patient discharged from?  [facility]   Does the patient have one of the following disease processes/diagnoses(primary or secondary)?  General Surgery   Is there a successful TCM telephone encounter documented?  No   Week 1 attempt successful?  No   Unsuccessful attempts  Attempt 1          Norris Morin RN

## 2019-05-08 ENCOUNTER — READMISSION MANAGEMENT (OUTPATIENT)
Dept: CALL CENTER | Facility: HOSPITAL | Age: 64
End: 2019-05-08

## 2019-05-08 NOTE — OUTREACH NOTE
General Surgery Week 1 Survey      Responses   Facility patient discharged from?  [facility]   Does the patient have one of the following disease processes/diagnoses(primary or secondary)?  General Surgery   Is there a successful TCM telephone encounter documented?  No   Week 1 attempt successful?  Yes   Call start time  1233   Call end time  1241   General alerts for this patient  Pt can't hear well on phone, r/t hearing aides    Discharge diagnosis  Paraesophageal hernia, GERD with esophagitis, , s/p laparoscopic repair of colon enterotomy   Is patient permission given to speak with other caregiver?  Yes   List who call center can speak with  sister/POA   Person spoke with today (if not patient) and relationship  sister   Meds reviewed with patient/caregiver?  Yes   Is the patient having any side effects they believe may be caused by any medication additions or changes?  No   Does the patient have all medications related to this admission filled (includes all antibiotics, pain medications, etc.)  Yes   Is the patient taking all medications as directed (includes completed medication regime)?  Yes   Medication comments  Pain is well controlled.    Does the patient have a follow up appointment scheduled with their surgeon?  Yes   Has the patient kept scheduled appointments due by today?  N/A   What is the Home health agency?   [facility]   Has home health visited the patient within 72 hours of discharge?  Yes   Psychosocial issues?  No   Comments  Incisions are healing without s/sx of infection. Appetite is decreased   Did the patient receive a copy of their discharge instructions?  Yes   Nursing interventions  Reviewed instructions with patient   What is the patient's perception of their health status since discharge?  Improving   Nursing interventions  Nurse provided patient education   Is the patient /caregiver able to teach back basic post-op care?  Continue use of incentive spirometry at least 1 week post  discharge, Keep incision areas clean,dry and protected, Lifting as instructed by MD in discharge instructions   Is the patient/caregiver able to teach back signs and symptoms of incisional infection?  Increased drainage or bleeding, Increased redness, swelling or pain at the incisonal site, Pus or odor from incision, Fever   Is the patient/caregiver able to teach back steps to recovery at home?  Set small, achievable goals for return to baseline health, Weigh daily, Practice good oral hygiene   Is the patient/caregiver able to teach back the hierarchy of who to call/visit for symptoms/problems? PCP, Specialist, Home health nurse, Urgent Care, ED, 911  Yes   Week 1 call completed?  Yes          Jesica Pak, RN

## 2019-05-14 ENCOUNTER — OFFICE VISIT (OUTPATIENT)
Dept: SURGERY | Facility: CLINIC | Age: 64
End: 2019-05-14

## 2019-05-14 VITALS
DIASTOLIC BLOOD PRESSURE: 84 MMHG | OXYGEN SATURATION: 99 % | TEMPERATURE: 98.9 F | HEIGHT: 70 IN | WEIGHT: 232.6 LBS | HEART RATE: 74 BPM | SYSTOLIC BLOOD PRESSURE: 150 MMHG | BODY MASS INDEX: 33.3 KG/M2 | RESPIRATION RATE: 17 BRPM

## 2019-05-14 DIAGNOSIS — K44.9 PARAESOPHAGEAL HERNIA: Primary | ICD-10-CM

## 2019-05-14 DIAGNOSIS — K21.9 GASTROESOPHAGEAL REFLUX DISEASE, ESOPHAGITIS PRESENCE NOT SPECIFIED: ICD-10-CM

## 2019-05-14 PROCEDURE — 99024 POSTOP FOLLOW-UP VISIT: CPT | Performed by: SURGERY

## 2019-05-14 NOTE — PROGRESS NOTES
5/14/2019    Patient Information  Mary Kay Goldstein  [address]  [place] [address]  1955  [phone] (home)     Chief Complaint   Patient presents with   • Post-op     STATUS POST LAP LYSIS OF ADHESIONS       HPI  Is a 63-year-old white female with very symptomatic paraesophageal hernia with a third of her stomach in her chest.  2 weeks ago I attempted to repair this hernia, but I had an enterotomy in the colon because of the large amount of adhesions.  As such, I repaired the enterotomy, lysis of adhesions and put the repair of paraesophageal hernia and Nissen fundoplication on hold since I would be using a patch to repair the hernia.  She is now ready to undergo the definitive repair.  Her postoperative course was very smooth.  Patient's midline wound secondary to a tubal ligation, cholecystectomy, appendectomy, and removal of right tube and ovary, and a partial colon resection in [place] many years ago.  This would account for the adhesions she had had previous surgery.        Patient Active Problem List   Diagnosis   • Epilepsy (CMS/HCC)   • Guillain Barré syndrome (CMS/HCC)   • Nausea and vomiting   • Hematochezia   • Diarrhea   • Hiatal hernia   • History of stomach ulcers   • Paraesophageal hernia   • Epigastric pain   • Internal hemorrhoids   • Gastroesophageal reflux disease         Past Medical History:   Diagnosis Date   • Arthritis    • Asthma    • Blind    • Depression    • Diabetes mellitus (CMS/HCC)    • Elevated cholesterol    • GERD (gastroesophageal reflux disease)    • Hard of hearing     wears a hearing aid in left ear   • Hiatal hernia    • Hypertension    • Low back pain    • PONV (postoperative nausea and vomiting)    • Retinitis pigmentosa     bilat   • Seizures (CMS/HCC)    • Seizures (CMS/HCC)      LAST SEISURE LAST YEAR   • Sleep apnea     cpap   • Stomach ulcer          Past Surgical History:   Procedure Laterality Date   • APPENDECTOMY     • BREAST SURGERY      reduction   •  CHOLECYSTECTOMY     • COLONOSCOPY      about 7 years ago   • COLONOSCOPY N/A 2/8/2019    Procedure: COLONOSCOPY;  Surgeon: Lukas Nelson MD;  Location: [facility] OR;  Service: Gastroenterology   • ENDOSCOPY N/A 2/8/2019    Procedure: ESOPHAGOGASTRODUODENOSCOPY;  Surgeon: Lukas Nelson MD;  Location: [facility] OR;  Service: Gastroenterology   • EYE SURGERY      cataract bilat   • HYSTERECTOMY     • LAPAROSCOPIC LYSIS OF ADHESIONS N/A 4/29/2019    Procedure: Lysis of adhesions repair of colon enterotomy;  Surgeon: Lukas Nelson MD;  Location: [facility] OR;  Service: General         Family History   Problem Relation Age of Onset   • Cancer Mother    • Diabetes Mother    • Hypertension Mother    • Hypertension Father    • Heart disease Father          Social History     Tobacco Use   • Smoking status: Never Smoker   • Smokeless tobacco: Never Used   Substance Use Topics   • Alcohol use: No   • Drug use: No       Current Outpatient Medications   Medication Sig Dispense Refill   • albuterol sulfate  (90 Base) MCG/ACT inhaler Inhale 2 puffs Every 4 (Four) Hours As Needed for Wheezing.     • amLODIPine (NORVASC) 5 MG tablet Take 5 mg by mouth Daily.     • busPIRone (BUSPAR) 5 MG tablet Take 5 mg by mouth 2 (Two) Times a Day.     • cetirizine (zyrTEC) 10 MG tablet Take 10 mg by mouth Every Night.     • chlorthalidone (HYGROTON) 25 MG tablet Take 25 mg by mouth Daily.     • FLUoxetine (PROzac) 10 MG capsule Take 10 mg by mouth Daily.     • gabapentin (NEURONTIN) 600 MG tablet Take 600 mg by mouth 3 (Three) Times a Day.     • metFORMIN (GLUCOPHAGE) 1000 MG tablet Take 1,000 mg by mouth 2 (Two) Times a Day With Meals.     • montelukast (SINGULAIR) 10 MG tablet Take 10 mg by mouth every night.     • OXcarbazepine (TRILEPTAL) 600 MG tablet Take 600 mg by mouth 2 (Two) Times a Day.     • oxyCODONE (ROXICODONE) 10 MG tablet Take 10 mg by mouth Every 6 (Six) Hours As Needed for Severe Pain .     • oxyCODONE-acetaminophen  "(PERCOCET) 5-325 MG per tablet Take 1 to 2 tablets by mouth Every 4 (Four) Hours As Needed for Pain. 10 tablet 0   • pantoprazole (PROTONIX) 40 MG EC tablet Take 40 mg by mouth 2 (Two) Times a Day.     • polyethylene glycol (MIRALAX) packet Take 17 g by mouth Daily.     • SITagliptin (JANUVIA) 50 MG tablet Take 50 mg by mouth 2 (Two) Times a Day.     • sucralfate (CARAFATE) 1 g tablet Take 1 g by mouth 4 (Four) Times a Day.     • traZODone (DESYREL) 100 MG tablet Take 100 mg by mouth Every Night.       No current facility-administered medications for this visit.          Allergies  Zithromax [azithromycin]; Amoxicillin; Codeine; Penicillins; and Phenobarbital    /84   Pulse 74   Temp 98.9 °F (37.2 °C) (Oral)   Resp 17   Ht 177.8 cm (70\")   Wt 106 kg (232 lb 9.6 oz)   SpO2 99%   BMI 33.37 kg/m²      Physical Exam    General :  Patient is a 63 y.o. female in no acute distress.    HEENT:  Normocephalic, pupils equally round and reactive to light, extraocular motions intact.  Chest:  Clear bilaterally. Equal breath sounds. No rales or rhonchi.  Heart:   Regular rate and rhythm.  Abdomen:  Soft, nontender.  Wounds look good  :  Normal external genitalia.  Rectal:  Not performed.  Extremities:  No clubbing cyanosis or edema. Good femoral, popliteal, dorsalis pedis and posterior tibial pulse.  Neurologic:  Patient oriented ×3. Cranial nerves grossly intact. No sensory,cellebellar, or motor dysfunction.                ASSESSMENT  Paraesophageal hernia        PLAN    Laparoscopic repair of paraesophageal hernia with bioabsorbable patch and Nissen fundoplication.    Risks, benefits,, and possible complications have been discussed in detail.  She and son understand and agreed to the plan.    Patient's Body mass index is 33.37 kg/m². BMI is above normal parameters. Recommendations include: educational material.           This document signed by Lukas Nelson MD May 14, 2019 2:34 PM   "

## 2019-05-24 ENCOUNTER — PREP FOR SURGERY (OUTPATIENT)
Dept: OTHER | Facility: HOSPITAL | Age: 64
End: 2019-05-24

## 2019-05-24 DIAGNOSIS — K21.9 GERD (GASTROESOPHAGEAL REFLUX DISEASE): Primary | ICD-10-CM

## 2019-05-24 RX ORDER — LEVOFLOXACIN 5 MG/ML
500 INJECTION, SOLUTION INTRAVENOUS ONCE
Status: CANCELLED | OUTPATIENT
Start: 2019-06-24 | End: 2019-05-24

## 2019-06-03 ENCOUNTER — TELEPHONE (OUTPATIENT)
Dept: SURGERY | Facility: CLINIC | Age: 64
End: 2019-06-03

## 2019-06-21 ENCOUNTER — APPOINTMENT (OUTPATIENT)
Dept: PREADMISSION TESTING | Facility: HOSPITAL | Age: 64
End: 2019-06-21

## 2019-06-21 ENCOUNTER — TELEPHONE (OUTPATIENT)
Dept: SURGERY | Facility: CLINIC | Age: 64
End: 2019-06-21

## 2019-06-21 LAB
ANION GAP SERPL CALCULATED.3IONS-SCNC: 16.5 MMOL/L
BUN BLD-MCNC: 8 MG/DL (ref 8–23)
BUN/CREAT SERPL: 10.7 (ref 7–25)
CALCIUM SPEC-SCNC: 9.4 MG/DL (ref 8.6–10.5)
CHLORIDE SERPL-SCNC: 82 MMOL/L (ref 98–107)
CO2 SERPL-SCNC: 30.5 MMOL/L (ref 22–29)
CREAT BLD-MCNC: 0.75 MG/DL (ref 0.57–1)
DEPRECATED RDW RBC AUTO: 51.5 FL (ref 37–54)
ERYTHROCYTE [DISTWIDTH] IN BLOOD BY AUTOMATED COUNT: 17.2 % (ref 12.3–15.4)
GFR SERPL CREATININE-BSD FRML MDRD: 79 ML/MIN/1.73
GLUCOSE BLD-MCNC: 270 MG/DL (ref 65–99)
HCT VFR BLD AUTO: 39.5 % (ref 34–46.6)
HGB BLD-MCNC: 12.9 G/DL (ref 12–15.9)
MCH RBC QN AUTO: 27.1 PG (ref 26.6–33)
MCHC RBC AUTO-ENTMCNC: 32.7 G/DL (ref 31.5–35.7)
MCV RBC AUTO: 83 FL (ref 79–97)
PLATELET # BLD AUTO: 303 10*3/MM3 (ref 140–450)
PMV BLD AUTO: 10 FL (ref 6–12)
POTASSIUM BLD-SCNC: 2.5 MMOL/L (ref 3.5–5.2)
RBC # BLD AUTO: 4.76 10*6/MM3 (ref 3.77–5.28)
SODIUM BLD-SCNC: 129 MMOL/L (ref 136–145)
WBC NRBC COR # BLD: 8.76 10*3/MM3 (ref 3.4–10.8)

## 2019-06-21 PROCEDURE — 85027 COMPLETE CBC AUTOMATED: CPT | Performed by: ANESTHESIOLOGY

## 2019-06-21 PROCEDURE — 80048 BASIC METABOLIC PNL TOTAL CA: CPT | Performed by: ANESTHESIOLOGY

## 2019-06-21 PROCEDURE — 36415 COLL VENOUS BLD VENIPUNCTURE: CPT

## 2019-06-21 NOTE — TELEPHONE ENCOUNTER
I called the patient and spoke to her son. I told him that Dr. Nelson was going to prescribe Mary Kay some potassium to take today, tomorrow and Sunday. I verified we have the correct pharmacy. He said she has some potassium CQ 20 mg there at home. Per Dr Nelson I told him it was very important she take one today, 2 tomorrow and 2 Sunday. He voiced understanding.

## 2019-06-24 ENCOUNTER — ANESTHESIA EVENT (OUTPATIENT)
Dept: PERIOP | Facility: HOSPITAL | Age: 64
End: 2019-06-24

## 2019-06-24 ENCOUNTER — ANESTHESIA (OUTPATIENT)
Dept: PERIOP | Facility: HOSPITAL | Age: 64
End: 2019-06-24

## 2019-06-24 ENCOUNTER — APPOINTMENT (OUTPATIENT)
Dept: GENERAL RADIOLOGY | Facility: HOSPITAL | Age: 64
End: 2019-06-24

## 2019-06-24 ENCOUNTER — HOSPITAL ENCOUNTER (INPATIENT)
Facility: HOSPITAL | Age: 64
LOS: 7 days | Discharge: HOME | End: 2019-07-01
Attending: SURGERY | Admitting: SURGERY

## 2019-06-24 DIAGNOSIS — K21.9 GERD (GASTROESOPHAGEAL REFLUX DISEASE): ICD-10-CM

## 2019-06-24 LAB
A-A DO2: 102.2 MMHG (ref 0–300)
ALBUMIN SERPL-MCNC: 2.88 G/DL (ref 3.5–5.2)
ALBUMIN/GLOB SERPL: 0.8 G/DL
ALP SERPL-CCNC: 129 U/L (ref 39–117)
ALT SERPL W P-5'-P-CCNC: 69 U/L (ref 1–33)
ANION GAP SERPL CALCULATED.3IONS-SCNC: 18.5 MMOL/L
ARTERIAL PATENCY WRIST A: ABNORMAL
ARTERIAL PATENCY WRIST A: POSITIVE
AST SERPL-CCNC: 106 U/L (ref 1–32)
ATMOSPHERIC PRESS: 724 MMHG
ATMOSPHERIC PRESS: 724 MMHG
BASE EXCESS BLDA CALC-SCNC: -0.1 MMOL/L
BASE EXCESS BLDA CALC-SCNC: -0.7 MMOL/L
BASOPHILS # BLD AUTO: 0.01 10*3/MM3 (ref 0–0.2)
BASOPHILS NFR BLD AUTO: 0 % (ref 0–1.5)
BDY SITE: ABNORMAL
BDY SITE: ABNORMAL
BILIRUB SERPL-MCNC: 0.3 MG/DL (ref 0.2–1.2)
BODY TEMPERATURE: 98.6 C
BUN BLD-MCNC: 6 MG/DL (ref 8–23)
BUN/CREAT SERPL: 7 (ref 7–25)
CALCIUM SPEC-SCNC: 8.5 MG/DL (ref 8.6–10.5)
CHLORIDE SERPL-SCNC: 85 MMOL/L (ref 98–107)
CO2 SERPL-SCNC: 19.5 MMOL/L (ref 22–29)
COHGB MFR BLD: 1.5 % (ref 0–5)
COHGB MFR BLD: 1.6 % (ref 0–5)
CREAT BLD-MCNC: 0.86 MG/DL (ref 0.57–1)
DEPRECATED RDW RBC AUTO: 53.8 FL (ref 37–54)
EOSINOPHIL # BLD AUTO: 0 10*3/MM3 (ref 0–0.4)
EOSINOPHIL NFR BLD AUTO: 0 % (ref 0.3–6.2)
ERYTHROCYTE [DISTWIDTH] IN BLOOD BY AUTOMATED COUNT: 17 % (ref 12.3–15.4)
GAS FLOW AIRWAY: >50 LPM
GFR SERPL CREATININE-BSD FRML MDRD: 67 ML/MIN/1.73
GLOBULIN UR ELPH-MCNC: 3.4 GM/DL
GLUCOSE BLD-MCNC: 324 MG/DL (ref 65–99)
GLUCOSE BLDC GLUCOMTR-MCNC: 207 MG/DL (ref 70–130)
GLUCOSE BLDC GLUCOMTR-MCNC: 300 MG/DL (ref 70–130)
HCO3 BLDA-SCNC: 23.7 MMOL/L (ref 22–26)
HCO3 BLDA-SCNC: 26.3 MMOL/L (ref 22–26)
HCT VFR BLD AUTO: 41.9 % (ref 34–46.6)
HCT VFR BLD CALC: 39 % (ref 37–47)
HCT VFR BLD CALC: 40 % (ref 37–47)
HGB BLD-MCNC: 13.1 G/DL (ref 12–15.9)
HGB BLDA-MCNC: 13.1 G/DL (ref 12–16)
HGB BLDA-MCNC: 13.7 G/DL (ref 12–16)
HOROWITZ INDEX BLD+IHG-RTO: 40 %
HOROWITZ INDEX BLD+IHG-RTO: 50 %
IMM GRANULOCYTES # BLD AUTO: 0.07 10*3/MM3 (ref 0–0.05)
IMM GRANULOCYTES NFR BLD AUTO: 0.3 % (ref 0–0.5)
LYMPHOCYTES # BLD AUTO: 0.64 10*3/MM3 (ref 0.7–3.1)
LYMPHOCYTES NFR BLD AUTO: 2.5 % (ref 19.6–45.3)
MCH RBC QN AUTO: 27 PG (ref 26.6–33)
MCHC RBC AUTO-ENTMCNC: 31.3 G/DL (ref 31.5–35.7)
MCV RBC AUTO: 86.2 FL (ref 79–97)
METHGB BLD QL: 0.2 % (ref 0–3)
METHGB BLD QL: 0.2 % (ref 0–3)
MODALITY: ABNORMAL
MODALITY: ABNORMAL
MONOCYTES # BLD AUTO: 1.86 10*3/MM3 (ref 0.1–0.9)
MONOCYTES NFR BLD AUTO: 7.3 % (ref 5–12)
NEUTROPHILS # BLD AUTO: 22.77 10*3/MM3 (ref 1.7–7)
NEUTROPHILS NFR BLD AUTO: 89.9 % (ref 42.7–76)
OXYHGB MFR BLDV: 96.7 % (ref 85–100)
OXYHGB MFR BLDV: 97.2 % (ref 85–100)
PCO2 BLDA: 38 MM HG (ref 35–45)
PCO2 BLDA: 49.5 MM HG (ref 35–45)
PEEP RESPIRATORY: 5 CM[H2O]
PEEP RESPIRATORY: 5 CM[H2O]
PH BLDA: 7.34 PH UNITS (ref 7.35–7.45)
PH BLDA: 7.41 PH UNITS (ref 7.35–7.45)
PLATELET # BLD AUTO: 256 10*3/MM3 (ref 140–450)
PMV BLD AUTO: 9.2 FL (ref 6–12)
PO2 BLDA: 124.9 MM HG (ref 80–100)
PO2 BLDA: 141.9 MM HG (ref 80–100)
POTASSIUM BLD-SCNC: 3.7 MMOL/L (ref 3.5–5.2)
PROT SERPL-MCNC: 6.3 G/DL (ref 6–8.5)
RBC # BLD AUTO: 4.86 10*6/MM3 (ref 3.77–5.28)
SAO2 % BLDCOA: 98.5 % (ref 90–100)
SAO2 % BLDCOA: 98.9 % (ref 90–100)
SET MECH RESP RATE: 12
SET MECH RESP RATE: 14
SODIUM BLD-SCNC: 123 MMOL/L (ref 136–145)
VENTILATOR MODE: ABNORMAL
VENTILATOR MODE: ABNORMAL
VT ON VENT VENT: 500 ML
VT ON VENT VENT: 500 ML
WBC NRBC COR # BLD: 25.35 10*3/MM3 (ref 3.4–10.8)

## 2019-06-24 PROCEDURE — 43282 LAP PARAESOPH HER RPR W/MESH: CPT | Performed by: SURGERY

## 2019-06-24 PROCEDURE — 25010000002 ONDANSETRON PER 1 MG: Performed by: NURSE ANESTHETIST, CERTIFIED REGISTERED

## 2019-06-24 PROCEDURE — C1781 MESH (IMPLANTABLE): HCPCS | Performed by: SURGERY

## 2019-06-24 PROCEDURE — 94640 AIRWAY INHALATION TREATMENT: CPT

## 2019-06-24 PROCEDURE — 94002 VENT MGMT INPAT INIT DAY: CPT

## 2019-06-24 PROCEDURE — 0DV40ZZ RESTRICTION OF ESOPHAGOGASTRIC JUNCTION, OPEN APPROACH: ICD-10-PCS | Performed by: SURGERY

## 2019-06-24 PROCEDURE — 36600 WITHDRAWAL OF ARTERIAL BLOOD: CPT | Performed by: SURGERY

## 2019-06-24 PROCEDURE — 83050 HGB METHEMOGLOBIN QUAN: CPT | Performed by: SURGERY

## 2019-06-24 PROCEDURE — 25010000002 MIDAZOLAM PER 1 MG: Performed by: NURSE ANESTHETIST, CERTIFIED REGISTERED

## 2019-06-24 PROCEDURE — 80053 COMPREHEN METABOLIC PANEL: CPT | Performed by: SURGERY

## 2019-06-24 PROCEDURE — 94799 UNLISTED PULMONARY SVC/PX: CPT

## 2019-06-24 PROCEDURE — 25010000002 PROPOFOL 10 MG/ML EMULSION: Performed by: NURSE ANESTHETIST, CERTIFIED REGISTERED

## 2019-06-24 PROCEDURE — 43283 LAP ESOPH LENGTHENING: CPT | Performed by: SURGERY

## 2019-06-24 PROCEDURE — 82375 ASSAY CARBOXYHB QUANT: CPT | Performed by: SURGERY

## 2019-06-24 PROCEDURE — 0BUT0JZ SUPPLEMENT DIAPHRAGM WITH SYNTHETIC SUBSTITUTE, OPEN APPROACH: ICD-10-PCS | Performed by: SURGERY

## 2019-06-24 PROCEDURE — 25010000002 FENTANYL CITRATE (PF) 100 MCG/2ML SOLUTION: Performed by: NURSE ANESTHETIST, CERTIFIED REGISTERED

## 2019-06-24 PROCEDURE — 82962 GLUCOSE BLOOD TEST: CPT

## 2019-06-24 PROCEDURE — 25010000002 PHENYLEPHRINE PER 1 ML: Performed by: NURSE ANESTHETIST, CERTIFIED REGISTERED

## 2019-06-24 PROCEDURE — 71045 X-RAY EXAM CHEST 1 VIEW: CPT

## 2019-06-24 PROCEDURE — 25010000002 LEVOFLOXACIN PER 250 MG: Performed by: SURGERY

## 2019-06-24 PROCEDURE — 25010000002 MORPHINE PER 10 MG: Performed by: SURGERY

## 2019-06-24 PROCEDURE — 0DB50ZZ EXCISION OF ESOPHAGUS, OPEN APPROACH: ICD-10-PCS | Performed by: SURGERY

## 2019-06-24 PROCEDURE — 71045 X-RAY EXAM CHEST 1 VIEW: CPT | Performed by: RADIOLOGY

## 2019-06-24 PROCEDURE — 0DX60Z5 TRANSFER STOMACH TO ESOPHAGUS, OPEN APPROACH: ICD-10-PCS | Performed by: SURGERY

## 2019-06-24 PROCEDURE — 85025 COMPLETE CBC W/AUTO DIFF WBC: CPT | Performed by: SURGERY

## 2019-06-24 PROCEDURE — 25010000002 PROPOFOL 1000 MG/ML EMULSION: Performed by: INTERNAL MEDICINE

## 2019-06-24 PROCEDURE — 82805 BLOOD GASES W/O2 SATURATION: CPT | Performed by: SURGERY

## 2019-06-24 PROCEDURE — 0DJ08ZZ INSPECTION OF UPPER INTESTINAL TRACT, VIA NATURAL OR ARTIFICIAL OPENING ENDOSCOPIC: ICD-10-PCS | Performed by: SURGERY

## 2019-06-24 PROCEDURE — 25010000002 PROPOFOL 10 MG/ML EMULSION

## 2019-06-24 DEVICE — THE ECHELON, ECHELON ENDOPATH™ AND ECHELON FLEX™ FAMILIES OF ENDOSCOPIC LINEAR CUTTERS AND RELOADS ARE STERILE, SINGLE PATIENT USE INSTRUMENTS THAT SIMULTANEOUSLY CUT AND STAPLE TISSUE. THERE ARE SIX STAGGERED ROWS OF STAPLES, THREE ON EITHER SIDE OF THE CUT LINE. THE 45 MM INSTRUMENTS HAVE A STAPLE LINE THATIS APPROXIMATELY 45 MM LONG AND A CUT LINE THAT IS APPROXIMATELY 42 MM LONG. THE SHAFT CAN ROTATE FREELY IN BOTH DIRECTIONS AND AN ARTICULATION MECHANISM ON ARTICULATING INSTRUMENTS ENABLES BENDING THE DISTAL PORTIONOF THE SHAFT TO FACILITATE LATERAL ACCESS OF THE OPERATIVE SITE.THE INSTRUMENTS ARE SHIPPED WITHOUT A RELOAD AND MUST BE LOADED PRIOR TO USE. A STAPLE RETAINING CAP ON THE RELOAD PROTECTS THE STAPLE LEG POINTS DURING SHIPPING AND TRANSPORTATION. THE INSTRUMENTS’ LOCK-OUT FEATURE IS DESIGNED TO PREVENT A USED RELOAD FROM BEING REFIRED.
Type: IMPLANTABLE DEVICE | Site: STOMACH | Status: FUNCTIONAL
Brand: ECHELON ENDOPATH

## 2019-06-24 DEVICE — PHASIX ST MESH, RECTANGLE
Type: IMPLANTABLE DEVICE | Status: FUNCTIONAL
Brand: PHASIX ST MESH

## 2019-06-24 RX ORDER — METOPROLOL TARTRATE 5 MG/5ML
INJECTION INTRAVENOUS AS NEEDED
Status: DISCONTINUED | OUTPATIENT
Start: 2019-06-24 | End: 2019-06-24 | Stop reason: SURG

## 2019-06-24 RX ORDER — ONDANSETRON 2 MG/ML
INJECTION INTRAMUSCULAR; INTRAVENOUS AS NEEDED
Status: DISCONTINUED | OUTPATIENT
Start: 2019-06-24 | End: 2019-06-24 | Stop reason: SURG

## 2019-06-24 RX ORDER — SODIUM CHLORIDE 9 MG/ML
INJECTION, SOLUTION INTRAVENOUS AS NEEDED
Status: DISCONTINUED | OUTPATIENT
Start: 2019-06-24 | End: 2019-06-24 | Stop reason: HOSPADM

## 2019-06-24 RX ORDER — PROPOFOL 10 MG/ML
VIAL (ML) INTRAVENOUS AS NEEDED
Status: DISCONTINUED | OUTPATIENT
Start: 2019-06-24 | End: 2019-06-24 | Stop reason: SURG

## 2019-06-24 RX ORDER — LEVOFLOXACIN 5 MG/ML
500 INJECTION, SOLUTION INTRAVENOUS ONCE
Status: COMPLETED | OUTPATIENT
Start: 2019-06-24 | End: 2019-06-24

## 2019-06-24 RX ORDER — MONTELUKAST SODIUM 10 MG/1
10 TABLET ORAL NIGHTLY
Status: DISCONTINUED | OUTPATIENT
Start: 2019-06-24 | End: 2019-07-01 | Stop reason: HOSPADM

## 2019-06-24 RX ORDER — PANTOPRAZOLE SODIUM 40 MG/1
40 TABLET, DELAYED RELEASE ORAL
Status: DISCONTINUED | OUTPATIENT
Start: 2019-06-24 | End: 2019-06-24 | Stop reason: CLARIF

## 2019-06-24 RX ORDER — SODIUM CHLORIDE 0.9 % (FLUSH) 0.9 %
3 SYRINGE (ML) INJECTION EVERY 12 HOURS SCHEDULED
Status: DISCONTINUED | OUTPATIENT
Start: 2019-06-24 | End: 2019-07-01 | Stop reason: HOSPADM

## 2019-06-24 RX ORDER — ALBUTEROL SULFATE 2.5 MG/3ML
2.5 SOLUTION RESPIRATORY (INHALATION) EVERY 4 HOURS PRN
Status: DISCONTINUED | OUTPATIENT
Start: 2019-06-24 | End: 2019-07-01 | Stop reason: HOSPADM

## 2019-06-24 RX ORDER — BUSPIRONE HYDROCHLORIDE 5 MG/1
5 TABLET ORAL 2 TIMES DAILY
Status: DISCONTINUED | OUTPATIENT
Start: 2019-06-24 | End: 2019-07-01 | Stop reason: HOSPADM

## 2019-06-24 RX ORDER — MIDAZOLAM HYDROCHLORIDE 1 MG/ML
INJECTION INTRAMUSCULAR; INTRAVENOUS AS NEEDED
Status: DISCONTINUED | OUTPATIENT
Start: 2019-06-24 | End: 2019-06-24 | Stop reason: SURG

## 2019-06-24 RX ORDER — PROPOFOL 10 MG/ML
VIAL (ML) INTRAVENOUS
Status: COMPLETED
Start: 2019-06-24 | End: 2019-06-24

## 2019-06-24 RX ORDER — FENTANYL CITRATE 50 UG/ML
50 INJECTION, SOLUTION INTRAMUSCULAR; INTRAVENOUS
Status: DISCONTINUED | OUTPATIENT
Start: 2019-06-24 | End: 2019-06-25

## 2019-06-24 RX ORDER — AMLODIPINE BESYLATE 5 MG/1
5 TABLET ORAL DAILY
Status: DISCONTINUED | OUTPATIENT
Start: 2019-06-24 | End: 2019-07-01 | Stop reason: HOSPADM

## 2019-06-24 RX ORDER — SODIUM CHLORIDE 0.9 % (FLUSH) 0.9 %
3-10 SYRINGE (ML) INJECTION AS NEEDED
Status: DISCONTINUED | OUTPATIENT
Start: 2019-06-24 | End: 2019-06-24 | Stop reason: HOSPADM

## 2019-06-24 RX ORDER — MAGNESIUM HYDROXIDE 1200 MG/15ML
LIQUID ORAL AS NEEDED
Status: DISCONTINUED | OUTPATIENT
Start: 2019-06-24 | End: 2019-06-24 | Stop reason: HOSPADM

## 2019-06-24 RX ORDER — POLYETHYLENE GLYCOL 3350 17 G/17G
17 POWDER, FOR SOLUTION ORAL DAILY
Status: DISCONTINUED | OUTPATIENT
Start: 2019-06-24 | End: 2019-07-01 | Stop reason: HOSPADM

## 2019-06-24 RX ORDER — ONDANSETRON 2 MG/ML
4 INJECTION INTRAMUSCULAR; INTRAVENOUS EVERY 6 HOURS PRN
Status: DISCONTINUED | OUTPATIENT
Start: 2019-06-24 | End: 2019-07-01 | Stop reason: HOSPADM

## 2019-06-24 RX ORDER — CETIRIZINE HYDROCHLORIDE 10 MG/1
10 TABLET ORAL NIGHTLY
Status: DISCONTINUED | OUTPATIENT
Start: 2019-06-24 | End: 2019-07-01 | Stop reason: HOSPADM

## 2019-06-24 RX ORDER — CHLORHEXIDINE GLUCONATE 0.12 MG/ML
15 RINSE ORAL EVERY 12 HOURS SCHEDULED
Status: DISCONTINUED | OUTPATIENT
Start: 2019-06-24 | End: 2019-06-25

## 2019-06-24 RX ORDER — MORPHINE SULFATE 8 MG/ML
6 INJECTION INTRAMUSCULAR; INTRAVENOUS; SUBCUTANEOUS
Status: DISCONTINUED | OUTPATIENT
Start: 2019-06-24 | End: 2019-07-01 | Stop reason: HOSPADM

## 2019-06-24 RX ORDER — FENTANYL CITRATE 50 UG/ML
INJECTION, SOLUTION INTRAMUSCULAR; INTRAVENOUS AS NEEDED
Status: DISCONTINUED | OUTPATIENT
Start: 2019-06-24 | End: 2019-06-24 | Stop reason: SURG

## 2019-06-24 RX ORDER — LANSOPRAZOLE
30 KIT 2 TIMES DAILY WITH MEALS
Status: DISCONTINUED | OUTPATIENT
Start: 2019-06-24 | End: 2019-06-26 | Stop reason: ALTCHOICE

## 2019-06-24 RX ORDER — SODIUM CHLORIDE 0.9 % (FLUSH) 0.9 %
3-10 SYRINGE (ML) INJECTION AS NEEDED
Status: DISCONTINUED | OUTPATIENT
Start: 2019-06-24 | End: 2019-07-01 | Stop reason: HOSPADM

## 2019-06-24 RX ORDER — GABAPENTIN 300 MG/1
600 CAPSULE ORAL EVERY 8 HOURS SCHEDULED
Status: DISCONTINUED | OUTPATIENT
Start: 2019-06-24 | End: 2019-07-01 | Stop reason: HOSPADM

## 2019-06-24 RX ORDER — OXCARBAZEPINE 300 MG/1
600 TABLET, FILM COATED ORAL EVERY 12 HOURS SCHEDULED
Status: DISCONTINUED | OUTPATIENT
Start: 2019-06-24 | End: 2019-07-01 | Stop reason: HOSPADM

## 2019-06-24 RX ORDER — SODIUM CHLORIDE, SODIUM LACTATE, POTASSIUM CHLORIDE, CALCIUM CHLORIDE 600; 310; 30; 20 MG/100ML; MG/100ML; MG/100ML; MG/100ML
125 INJECTION, SOLUTION INTRAVENOUS CONTINUOUS
Status: DISCONTINUED | OUTPATIENT
Start: 2019-06-24 | End: 2019-06-24 | Stop reason: HOSPADM

## 2019-06-24 RX ORDER — ROCURONIUM BROMIDE 10 MG/ML
INJECTION, SOLUTION INTRAVENOUS AS NEEDED
Status: DISCONTINUED | OUTPATIENT
Start: 2019-06-24 | End: 2019-06-24 | Stop reason: SURG

## 2019-06-24 RX ORDER — SODIUM CHLORIDE 0.9 % (FLUSH) 0.9 %
3 SYRINGE (ML) INJECTION EVERY 12 HOURS SCHEDULED
Status: DISCONTINUED | OUTPATIENT
Start: 2019-06-24 | End: 2019-06-24 | Stop reason: HOSPADM

## 2019-06-24 RX ORDER — SUCRALFATE 1 G/1
1 TABLET ORAL 4 TIMES DAILY
Status: DISCONTINUED | OUTPATIENT
Start: 2019-06-24 | End: 2019-06-26

## 2019-06-24 RX ORDER — NALOXONE HCL 0.4 MG/ML
0.4 VIAL (ML) INJECTION
Status: DISCONTINUED | OUTPATIENT
Start: 2019-06-24 | End: 2019-07-01 | Stop reason: HOSPADM

## 2019-06-24 RX ORDER — FAMOTIDINE 10 MG/ML
INJECTION, SOLUTION INTRAVENOUS AS NEEDED
Status: DISCONTINUED | OUTPATIENT
Start: 2019-06-24 | End: 2019-06-24 | Stop reason: SURG

## 2019-06-24 RX ORDER — TRAZODONE HYDROCHLORIDE 50 MG/1
100 TABLET ORAL NIGHTLY
Status: DISCONTINUED | OUTPATIENT
Start: 2019-06-24 | End: 2019-07-01 | Stop reason: HOSPADM

## 2019-06-24 RX ORDER — BUPIVACAINE HYDROCHLORIDE AND EPINEPHRINE 5; 5 MG/ML; UG/ML
INJECTION, SOLUTION EPIDURAL; INTRACAUDAL; PERINEURAL AS NEEDED
Status: DISCONTINUED | OUTPATIENT
Start: 2019-06-24 | End: 2019-06-24 | Stop reason: HOSPADM

## 2019-06-24 RX ORDER — OXYCODONE HYDROCHLORIDE 5 MG/1
10 TABLET ORAL EVERY 6 HOURS PRN
Status: DISCONTINUED | OUTPATIENT
Start: 2019-06-24 | End: 2019-07-01 | Stop reason: HOSPADM

## 2019-06-24 RX ORDER — SODIUM CHLORIDE 9 MG/ML
50 INJECTION, SOLUTION INTRAVENOUS CONTINUOUS
Status: DISCONTINUED | OUTPATIENT
Start: 2019-06-24 | End: 2019-06-30

## 2019-06-24 RX ORDER — ONDANSETRON 4 MG/1
4 TABLET, FILM COATED ORAL EVERY 6 HOURS PRN
Status: DISCONTINUED | OUTPATIENT
Start: 2019-06-24 | End: 2019-07-01 | Stop reason: HOSPADM

## 2019-06-24 RX ORDER — CHLORTHALIDONE 50 MG/1
25 TABLET ORAL DAILY
Status: DISCONTINUED | OUTPATIENT
Start: 2019-06-25 | End: 2019-07-01 | Stop reason: HOSPADM

## 2019-06-24 RX ORDER — FLUOXETINE 10 MG/1
10 CAPSULE ORAL DAILY
Status: DISCONTINUED | OUTPATIENT
Start: 2019-06-24 | End: 2019-07-01 | Stop reason: HOSPADM

## 2019-06-24 RX ADMIN — PHENYLEPHRINE HYDROCHLORIDE 100 MCG: 10 INJECTION, SOLUTION INTRAMUSCULAR; INTRAVENOUS; SUBCUTANEOUS at 09:01

## 2019-06-24 RX ADMIN — SODIUM CHLORIDE 100 ML/HR: 9 INJECTION, SOLUTION INTRAVENOUS at 14:47

## 2019-06-24 RX ADMIN — POLYETHYLENE GLYCOL (3350) 17 G: 17 POWDER, FOR SOLUTION ORAL at 16:55

## 2019-06-24 RX ADMIN — MIDAZOLAM HYDROCHLORIDE 2 MG: 1 INJECTION, SOLUTION INTRAMUSCULAR; INTRAVENOUS at 12:55

## 2019-06-24 RX ADMIN — PHENYLEPHRINE HYDROCHLORIDE 100 MCG: 10 INJECTION, SOLUTION INTRAMUSCULAR; INTRAVENOUS; SUBCUTANEOUS at 10:28

## 2019-06-24 RX ADMIN — EPHEDRINE SULFATE 5 MG: 50 INJECTION INTRAMUSCULAR; INTRAVENOUS; SUBCUTANEOUS at 09:01

## 2019-06-24 RX ADMIN — EPHEDRINE SULFATE 5 MG: 50 INJECTION INTRAMUSCULAR; INTRAVENOUS; SUBCUTANEOUS at 08:41

## 2019-06-24 RX ADMIN — ROCURONIUM BROMIDE 10 MG: 10 INJECTION INTRAVENOUS at 11:23

## 2019-06-24 RX ADMIN — FENTANYL CITRATE 50 MCG: 50 INJECTION INTRAMUSCULAR; INTRAVENOUS at 08:28

## 2019-06-24 RX ADMIN — PROPOFOL 45 MCG/KG/MIN: 10 INJECTION, EMULSION INTRAVENOUS at 17:21

## 2019-06-24 RX ADMIN — SUCRALFATE 1 G: 1 TABLET ORAL at 20:46

## 2019-06-24 RX ADMIN — BUSPIRONE HYDROCHLORIDE 5 MG: 5 TABLET ORAL at 20:46

## 2019-06-24 RX ADMIN — ROCURONIUM BROMIDE 10 MG: 10 INJECTION INTRAVENOUS at 11:59

## 2019-06-24 RX ADMIN — CHLORHEXIDINE GLUCONATE 15 ML: 1.2 RINSE ORAL at 20:48

## 2019-06-24 RX ADMIN — PROPOFOL 1000 MG: 10 INJECTION, EMULSION INTRAVENOUS at 13:44

## 2019-06-24 RX ADMIN — PHENYLEPHRINE HYDROCHLORIDE 200 MCG: 10 INJECTION, SOLUTION INTRAMUSCULAR; INTRAVENOUS; SUBCUTANEOUS at 11:52

## 2019-06-24 RX ADMIN — PHENYLEPHRINE HYDROCHLORIDE 100 MCG: 10 INJECTION, SOLUTION INTRAMUSCULAR; INTRAVENOUS; SUBCUTANEOUS at 09:45

## 2019-06-24 RX ADMIN — METOPROLOL TARTRATE 2 MG: 1 INJECTION, SOLUTION INTRAVENOUS at 11:59

## 2019-06-24 RX ADMIN — LEVOFLOXACIN 500 MG: 5 INJECTION, SOLUTION INTRAVENOUS at 08:33

## 2019-06-24 RX ADMIN — SODIUM CHLORIDE, POTASSIUM CHLORIDE, SODIUM LACTATE AND CALCIUM CHLORIDE 125 ML/HR: 600; 310; 30; 20 INJECTION, SOLUTION INTRAVENOUS at 07:40

## 2019-06-24 RX ADMIN — ROCURONIUM BROMIDE 10 MG: 10 INJECTION INTRAVENOUS at 10:31

## 2019-06-24 RX ADMIN — CETIRIZINE HYDROCHLORIDE 10 MG: 10 TABLET, FILM COATED ORAL at 20:46

## 2019-06-24 RX ADMIN — METOPROLOL TARTRATE 2 MG: 1 INJECTION, SOLUTION INTRAVENOUS at 11:37

## 2019-06-24 RX ADMIN — ROCURONIUM BROMIDE 30 MG: 10 INJECTION INTRAVENOUS at 12:55

## 2019-06-24 RX ADMIN — PHENYLEPHRINE HYDROCHLORIDE 100 MCG: 10 INJECTION, SOLUTION INTRAMUSCULAR; INTRAVENOUS; SUBCUTANEOUS at 10:55

## 2019-06-24 RX ADMIN — MONTELUKAST SODIUM 10 MG: 10 TABLET, FILM COATED ORAL at 20:46

## 2019-06-24 RX ADMIN — PHENYLEPHRINE HYDROCHLORIDE 100 MCG: 10 INJECTION, SOLUTION INTRAMUSCULAR; INTRAVENOUS; SUBCUTANEOUS at 12:38

## 2019-06-24 RX ADMIN — SUCRALFATE 1 G: 1 TABLET ORAL at 17:00

## 2019-06-24 RX ADMIN — FLUOXETINE HYDROCHLORIDE 10 MG: 10 CAPSULE ORAL at 16:54

## 2019-06-24 RX ADMIN — PHENYLEPHRINE HYDROCHLORIDE 100 MCG: 10 INJECTION, SOLUTION INTRAMUSCULAR; INTRAVENOUS; SUBCUTANEOUS at 11:31

## 2019-06-24 RX ADMIN — PHENYLEPHRINE HYDROCHLORIDE 100 MCG: 10 INJECTION, SOLUTION INTRAMUSCULAR; INTRAVENOUS; SUBCUTANEOUS at 11:15

## 2019-06-24 RX ADMIN — AZTREONAM 1 G: 1 INJECTION, POWDER, FOR SOLUTION INTRAMUSCULAR; INTRAVENOUS at 14:52

## 2019-06-24 RX ADMIN — OXCARBAZEPINE 600 MG: 300 TABLET, FILM COATED ORAL at 20:46

## 2019-06-24 RX ADMIN — ALBUTEROL SULFATE 2.5 MG: 2.5 SOLUTION RESPIRATORY (INHALATION) at 18:26

## 2019-06-24 RX ADMIN — SODIUM CHLORIDE, PRESERVATIVE FREE 3 ML: 5 INJECTION INTRAVENOUS at 13:46

## 2019-06-24 RX ADMIN — MIDAZOLAM HYDROCHLORIDE 2 MG: 1 INJECTION, SOLUTION INTRAMUSCULAR; INTRAVENOUS at 08:24

## 2019-06-24 RX ADMIN — ONDANSETRON 4 MG: 2 INJECTION, SOLUTION INTRAMUSCULAR; INTRAVENOUS at 12:46

## 2019-06-24 RX ADMIN — AMLODIPINE BESYLATE 5 MG: 5 TABLET ORAL at 16:54

## 2019-06-24 RX ADMIN — LANSOPRAZOLE 30 MG: KIT at 20:46

## 2019-06-24 RX ADMIN — FENTANYL CITRATE 50 MCG: 50 INJECTION INTRAMUSCULAR; INTRAVENOUS at 09:25

## 2019-06-24 RX ADMIN — PHENYLEPHRINE HYDROCHLORIDE 100 MCG: 10 INJECTION, SOLUTION INTRAMUSCULAR; INTRAVENOUS; SUBCUTANEOUS at 10:51

## 2019-06-24 RX ADMIN — PHENYLEPHRINE HYDROCHLORIDE 200 MCG: 10 INJECTION, SOLUTION INTRAMUSCULAR; INTRAVENOUS; SUBCUTANEOUS at 11:35

## 2019-06-24 RX ADMIN — FENTANYL CITRATE 50 MCG: 50 INJECTION INTRAMUSCULAR; INTRAVENOUS at 12:20

## 2019-06-24 RX ADMIN — ROCURONIUM BROMIDE 20 MG: 10 INJECTION INTRAVENOUS at 09:48

## 2019-06-24 RX ADMIN — OXYCODONE HYDROCHLORIDE 10 MG: 5 TABLET ORAL at 20:46

## 2019-06-24 RX ADMIN — SODIUM CHLORIDE, PRESERVATIVE FREE 3 ML: 5 INJECTION INTRAVENOUS at 20:48

## 2019-06-24 RX ADMIN — MORPHINE SULFATE 6 MG: 8 INJECTION INTRAVENOUS at 14:17

## 2019-06-24 RX ADMIN — SODIUM CHLORIDE, POTASSIUM CHLORIDE, SODIUM LACTATE AND CALCIUM CHLORIDE: 600; 310; 30; 20 INJECTION, SOLUTION INTRAVENOUS at 09:45

## 2019-06-24 RX ADMIN — METOPROLOL TARTRATE 1 MG: 1 INJECTION, SOLUTION INTRAVENOUS at 11:39

## 2019-06-24 RX ADMIN — SODIUM CHLORIDE, POTASSIUM CHLORIDE, SODIUM LACTATE AND CALCIUM CHLORIDE: 600; 310; 30; 20 INJECTION, SOLUTION INTRAVENOUS at 11:52

## 2019-06-24 RX ADMIN — PROPOFOL 50 MCG/KG/MIN: 10 INJECTION, EMULSION INTRAVENOUS at 20:46

## 2019-06-24 RX ADMIN — PHENYLEPHRINE HYDROCHLORIDE 100 MCG: 10 INJECTION, SOLUTION INTRAMUSCULAR; INTRAVENOUS; SUBCUTANEOUS at 11:23

## 2019-06-24 RX ADMIN — PROPOFOL 150 MG: 10 INJECTION, EMULSION INTRAVENOUS at 08:28

## 2019-06-24 RX ADMIN — ROCURONIUM BROMIDE 40 MG: 10 INJECTION INTRAVENOUS at 08:28

## 2019-06-24 RX ADMIN — TRAZODONE HYDROCHLORIDE 100 MG: 50 TABLET ORAL at 20:46

## 2019-06-24 RX ADMIN — GABAPENTIN 600 MG: 300 CAPSULE ORAL at 21:05

## 2019-06-24 RX ADMIN — FAMOTIDINE 20 MG: 10 INJECTION, SOLUTION INTRAVENOUS at 08:24

## 2019-06-24 RX ADMIN — PHENYLEPHRINE HYDROCHLORIDE 100 MCG: 10 INJECTION, SOLUTION INTRAMUSCULAR; INTRAVENOUS; SUBCUTANEOUS at 08:43

## 2019-06-24 RX ADMIN — PHENYLEPHRINE HYDROCHLORIDE 200 MCG: 10 INJECTION, SOLUTION INTRAMUSCULAR; INTRAVENOUS; SUBCUTANEOUS at 11:12

## 2019-06-24 RX ADMIN — ROCURONIUM BROMIDE 10 MG: 10 INJECTION INTRAVENOUS at 09:15

## 2019-06-24 RX ADMIN — PHENYLEPHRINE HYDROCHLORIDE 200 MCG: 10 INJECTION, SOLUTION INTRAMUSCULAR; INTRAVENOUS; SUBCUTANEOUS at 11:46

## 2019-06-24 RX ADMIN — AZTREONAM 1 G: 1 INJECTION, POWDER, FOR SOLUTION INTRAMUSCULAR; INTRAVENOUS at 21:05

## 2019-06-24 RX ADMIN — PHENYLEPHRINE HYDROCHLORIDE 100 MCG: 10 INJECTION, SOLUTION INTRAMUSCULAR; INTRAVENOUS; SUBCUTANEOUS at 12:34

## 2019-06-24 RX ADMIN — PHENYLEPHRINE HYDROCHLORIDE 100 MCG: 10 INJECTION, SOLUTION INTRAMUSCULAR; INTRAVENOUS; SUBCUTANEOUS at 12:42

## 2019-06-24 RX ADMIN — FENTANYL CITRATE 50 MCG: 50 INJECTION INTRAMUSCULAR; INTRAVENOUS at 09:08

## 2019-06-24 NOTE — ANESTHESIA POSTPROCEDURE EVALUATION
Patient: Mary Kay Goldstein    Procedure Summary     Date:  06/24/19 Room / Location:  [facility] OR 01 /  [facility] OR    Anesthesia Start:  0824 Anesthesia Stop:  1322    Procedures:       PARAESOPHAGEAL HERNIA REPAIR LAPAROSCOPIC (N/A Abdomen)      NISSEN FUNDOPLICATION LAPAROSCOPIC (N/A Abdomen)      ESOPHAGOGASTRODUODENOSCOPY (Esophagus) Diagnosis:       GERD (gastroesophageal reflux disease)      (GERD (gastroesophageal reflux disease) [K21.9])    Surgeon:  Lukas Nelson MD Provider:  Yahir Diaz MD    Anesthesia Type:  general ASA Status:  3          Anesthesia Type: general  Last vitals  BP   145/82 (06/24/19 1339)   Temp   97.8 °F (36.6 °C) (06/24/19 1339)   Pulse   101 (06/24/19 1339)   Resp   12 (06/24/19 1339)     SpO2   100 % (06/24/19 1339)     Post Anesthesia Care and Evaluation    Patient location during evaluation: ICU  Patient participation: complete - patient cannot participate  Level of consciousness: obtunded/minimal responses  Pain score: 1  Pain management: adequate  Airway patency: patent  Anesthetic complications: No anesthetic complications  PONV Status: controlled  Cardiovascular status: acceptable and hemodynamically stable  Respiratory status: acceptable and ETT  Hydration status: acceptable  No anesthesia care post op

## 2019-06-24 NOTE — ANESTHESIA PREPROCEDURE EVALUATION
Anesthesia Evaluation     Patient summary reviewed and Nursing notes reviewed   history of anesthetic complications:  NPO Solid Status: > 8 hours  NPO Liquid Status: > 8 hours           Airway   Mallampati: II  TM distance: >3 FB  Neck ROM: full  No difficulty expected  Dental - normal exam         Pulmonary - normal exam   (+) asthma, sleep apnea,   (-) not a smoker  Cardiovascular - normal exam  Exercise tolerance: good (4-7 METS)    NYHA Classification: II    (+) hypertension, valvular problems/murmurs MR and TI, hyperlipidemia,   (-) past MI, dysrhythmias, angina, CHF      Neuro/Psych  (+) seizures (last one Nov 2018), numbness, psychiatric history Depression and Anxiety,     (-) CVA  GI/Hepatic/Renal/Endo    (+)  hiatal hernia, GERD, PUD,  diabetes mellitus,   (-) hypothyroidism    Musculoskeletal     (+) back pain, gait problem,   Abdominal  - normal exam    Bowel sounds: normal.   Substance History - negative use     OB/GYN negative ob/gyn ROS         Other   (+) arthritis     ROS/Med Hx Other: Blind [place]    Hears best on left            Anesthesia Evaluation     no history of anesthetic complications:  NPO Solid Status: > 8 hours  NPO Liquid Status: > 8 hours           Airway   Mallampati: II  TM distance: >3 FB  Neck ROM: full  No difficulty expected  Dental      Pulmonary    (+) asthma,   Cardiovascular     (+) hypertension, hyperlipidemia,       Neuro/Psych  (+) seizures, numbness,     GI/Hepatic/Renal/Endo    (+)  hiatal hernia, GERD,  diabetes mellitus,     Musculoskeletal     Abdominal    Substance History      OB/GYN          Other        ROS/Med Hx Other: Blind [place]  Hears best on left                Anesthesia Plan    ASA 3     general     intravenous induction   Anesthetic plan, all risks, benefits, and alternatives have been provided, discussed and informed consent has been obtained with: patient.               Anesthesia Plan    ASA 3     general     intravenous induction   Anesthetic plan, all  risks, benefits, and alternatives have been provided, discussed and informed consent has been obtained with: patient and child.  Use of blood products discussed with patient and child  Consented to blood products.

## 2019-06-24 NOTE — ANESTHESIA PROCEDURE NOTES
Airway  Urgency: elective    Airway not difficult    General Information and Staff    Patient location during procedure: OR    Indications and Patient Condition  Indications for airway management: airway protection    Preoxygenated: yes  Mask difficulty assessment: 1 - vent by mask    Final Airway Details  Final airway type: endotracheal airway      Successful airway: ETT  Cuffed: yes   Successful intubation technique: direct laryngoscopy  Facilitating devices/methods: intubating stylet  Blade: Len[place]  Blade size: 3  ETT size (mm): 7.0  Cormack-Lehane Classification: grade I - full view of glottis  Placement verified by: chest auscultation and capnometry   Measured from: lips  ETT to lips (cm): 21  Number of attempts at approach: 1    Additional Comments  Dentition as preop

## 2019-06-25 LAB
A-A DO2: 70.2 MMHG (ref 0–300)
A-A DO2: 73.4 MMHG (ref 0–300)
ALBUMIN SERPL-MCNC: 2.45 G/DL (ref 3.5–5.2)
ALBUMIN/GLOB SERPL: 0.7 G/DL
ALP SERPL-CCNC: 114 U/L (ref 39–117)
ALT SERPL W P-5'-P-CCNC: 62 U/L (ref 1–33)
ANION GAP SERPL CALCULATED.3IONS-SCNC: 15.9 MMOL/L
ARTERIAL PATENCY WRIST A: ABNORMAL
ARTERIAL PATENCY WRIST A: POSITIVE
AST SERPL-CCNC: 86 U/L (ref 1–32)
ATMOSPHERIC PRESS: 724 MMHG
ATMOSPHERIC PRESS: 727 MMHG
BASE EXCESS BLDA CALC-SCNC: 2.5 MMOL/L
BASE EXCESS BLDA CALC-SCNC: 3.2 MMOL/L
BASOPHILS # BLD AUTO: 0.01 10*3/MM3 (ref 0–0.2)
BASOPHILS NFR BLD AUTO: 0.1 % (ref 0–1.5)
BDY SITE: ABNORMAL
BDY SITE: ABNORMAL
BILIRUB SERPL-MCNC: 0.3 MG/DL (ref 0.2–1.2)
BODY TEMPERATURE: 98.6 C
BODY TEMPERATURE: 98.6 C
BUN BLD-MCNC: 7 MG/DL (ref 8–23)
BUN/CREAT SERPL: 9.2 (ref 7–25)
CALCIUM SPEC-SCNC: 7.8 MG/DL (ref 8.6–10.5)
CHLORIDE SERPL-SCNC: 87 MMOL/L (ref 98–107)
CO2 SERPL-SCNC: 21.1 MMOL/L (ref 22–29)
COHGB MFR BLD: 1.2 % (ref 0–5)
COHGB MFR BLD: 1.4 % (ref 0–5)
CREAT BLD-MCNC: 0.76 MG/DL (ref 0.57–1)
DEPRECATED RDW RBC AUTO: 53.1 FL (ref 37–54)
EOSINOPHIL # BLD AUTO: 0 10*3/MM3 (ref 0–0.4)
EOSINOPHIL NFR BLD AUTO: 0 % (ref 0.3–6.2)
ERYTHROCYTE [DISTWIDTH] IN BLOOD BY AUTOMATED COUNT: 17 % (ref 12.3–15.4)
GFR SERPL CREATININE-BSD FRML MDRD: 77 ML/MIN/1.73
GLOBULIN UR ELPH-MCNC: 3.5 GM/DL
GLUCOSE BLD-MCNC: 325 MG/DL (ref 65–99)
HCO3 BLDA-SCNC: 26.4 MMOL/L (ref 22–26)
HCO3 BLDA-SCNC: 28.9 MMOL/L (ref 22–26)
HCT VFR BLD AUTO: 38.3 % (ref 34–46.6)
HCT VFR BLD CALC: 36 % (ref 37–47)
HCT VFR BLD CALC: 39 % (ref 37–47)
HGB BLD-MCNC: 12.1 G/DL (ref 12–15.9)
HGB BLDA-MCNC: 12.2 G/DL (ref 12–16)
HGB BLDA-MCNC: 13.4 G/DL (ref 12–16)
HOROWITZ INDEX BLD+IHG-RTO: 30 %
HOROWITZ INDEX BLD+IHG-RTO: 30 %
IMM GRANULOCYTES # BLD AUTO: 0.02 10*3/MM3 (ref 0–0.05)
IMM GRANULOCYTES NFR BLD AUTO: 0.2 % (ref 0–0.5)
LYMPHOCYTES # BLD AUTO: 1.2 10*3/MM3 (ref 0.7–3.1)
LYMPHOCYTES NFR BLD AUTO: 11.2 % (ref 19.6–45.3)
MCH RBC QN AUTO: 26.9 PG (ref 26.6–33)
MCHC RBC AUTO-ENTMCNC: 31.6 G/DL (ref 31.5–35.7)
MCV RBC AUTO: 85.3 FL (ref 79–97)
METHGB BLD QL: 0.2 % (ref 0–3)
METHGB BLD QL: 0.2 % (ref 0–3)
MODALITY: ABNORMAL
MODALITY: ABNORMAL
MONOCYTES # BLD AUTO: 1.53 10*3/MM3 (ref 0.1–0.9)
MONOCYTES NFR BLD AUTO: 14.3 % (ref 5–12)
NEUTROPHILS # BLD AUTO: 7.93 10*3/MM3 (ref 1.7–7)
NEUTROPHILS NFR BLD AUTO: 74.2 % (ref 42.7–76)
OXYHGB MFR BLDV: 92.8 % (ref 85–100)
OXYHGB MFR BLDV: 95.2 % (ref 85–100)
PCO2 BLDA: 38.2 MM HG (ref 35–45)
PCO2 BLDA: 48.4 MM HG (ref 35–45)
PEEP RESPIRATORY: 5 CM[H2O]
PEEP RESPIRATORY: 5 CM[H2O]
PH BLDA: 7.39 PH UNITS (ref 7.35–7.45)
PH BLDA: 7.46 PH UNITS (ref 7.35–7.45)
PLATELET # BLD AUTO: 258 10*3/MM3 (ref 140–450)
PMV BLD AUTO: 9.4 FL (ref 6–12)
PO2 BLDA: 76.9 MM HG (ref 80–100)
PO2 BLDA: 84.8 MM HG (ref 80–100)
POTASSIUM BLD-SCNC: 3.9 MMOL/L (ref 3.5–5.2)
PROT SERPL-MCNC: 5.9 G/DL (ref 6–8.5)
PSV: 8 CMH2O
RBC # BLD AUTO: 4.49 10*6/MM3 (ref 3.77–5.28)
SAO2 % BLDCOA: 94.3 % (ref 90–100)
SAO2 % BLDCOA: 96.6 % (ref 90–100)
SET MECH RESP RATE: 14
SODIUM BLD-SCNC: 124 MMOL/L (ref 136–145)
VENTILATOR MODE: ABNORMAL
VENTILATOR MODE: ABNORMAL
VT ON VENT VENT: 500 ML
WBC NRBC COR # BLD: 10.69 10*3/MM3 (ref 3.4–10.8)

## 2019-06-25 PROCEDURE — 25010000002 PROPOFOL 1000 MG/ML EMULSION: Performed by: INTERNAL MEDICINE

## 2019-06-25 PROCEDURE — 94799 UNLISTED PULMONARY SVC/PX: CPT

## 2019-06-25 PROCEDURE — 25010000002 FENTANYL CITRATE (PF) 100 MCG/2ML SOLUTION: Performed by: INTERNAL MEDICINE

## 2019-06-25 PROCEDURE — 80053 COMPREHEN METABOLIC PANEL: CPT | Performed by: SURGERY

## 2019-06-25 PROCEDURE — 83050 HGB METHEMOGLOBIN QUAN: CPT | Performed by: SURGERY

## 2019-06-25 PROCEDURE — 85025 COMPLETE CBC W/AUTO DIFF WBC: CPT | Performed by: SURGERY

## 2019-06-25 PROCEDURE — 36600 WITHDRAWAL OF ARTERIAL BLOOD: CPT | Performed by: SURGERY

## 2019-06-25 PROCEDURE — 25010000002 ENOXAPARIN PER 10 MG: Performed by: SURGERY

## 2019-06-25 PROCEDURE — 25010000002 MORPHINE PER 10 MG: Performed by: SURGERY

## 2019-06-25 PROCEDURE — 82375 ASSAY CARBOXYHB QUANT: CPT | Performed by: SURGERY

## 2019-06-25 PROCEDURE — 82805 BLOOD GASES W/O2 SATURATION: CPT | Performed by: SURGERY

## 2019-06-25 PROCEDURE — 25010000002 VANCOMYCIN 5 G RECONSTITUTED SOLUTION 5,000 MG VIAL: Performed by: SURGERY

## 2019-06-25 PROCEDURE — 94003 VENT MGMT INPAT SUBQ DAY: CPT

## 2019-06-25 PROCEDURE — 99024 POSTOP FOLLOW-UP VISIT: CPT | Performed by: SURGERY

## 2019-06-25 RX ADMIN — TRAZODONE HYDROCHLORIDE 100 MG: 50 TABLET ORAL at 20:21

## 2019-06-25 RX ADMIN — MORPHINE SULFATE 6 MG: 8 INJECTION INTRAVENOUS at 22:58

## 2019-06-25 RX ADMIN — BUSPIRONE HYDROCHLORIDE 5 MG: 5 TABLET ORAL at 20:21

## 2019-06-25 RX ADMIN — SODIUM CHLORIDE 100 ML/HR: 9 INJECTION, SOLUTION INTRAVENOUS at 01:44

## 2019-06-25 RX ADMIN — CHLORHEXIDINE GLUCONATE 15 ML: 1.2 RINSE ORAL at 20:22

## 2019-06-25 RX ADMIN — MONTELUKAST SODIUM 10 MG: 10 TABLET, FILM COATED ORAL at 20:21

## 2019-06-25 RX ADMIN — OXYCODONE HYDROCHLORIDE 10 MG: 5 TABLET ORAL at 11:14

## 2019-06-25 RX ADMIN — PROPOFOL 50 MCG/KG/MIN: 10 INJECTION, EMULSION INTRAVENOUS at 03:44

## 2019-06-25 RX ADMIN — MORPHINE SULFATE 6 MG: 8 INJECTION INTRAVENOUS at 16:12

## 2019-06-25 RX ADMIN — FLUOXETINE HYDROCHLORIDE 10 MG: 10 CAPSULE ORAL at 08:57

## 2019-06-25 RX ADMIN — PROPOFOL 50 MCG/KG/MIN: 10 INJECTION, EMULSION INTRAVENOUS at 00:50

## 2019-06-25 RX ADMIN — CHLORHEXIDINE GLUCONATE 15 ML: 1.2 RINSE ORAL at 08:56

## 2019-06-25 RX ADMIN — ENOXAPARIN SODIUM 40 MG: 40 INJECTION SUBCUTANEOUS at 08:57

## 2019-06-25 RX ADMIN — MORPHINE SULFATE 6 MG: 8 INJECTION INTRAVENOUS at 20:21

## 2019-06-25 RX ADMIN — SODIUM CHLORIDE 100 ML/HR: 9 INJECTION, SOLUTION INTRAVENOUS at 11:14

## 2019-06-25 RX ADMIN — LANSOPRAZOLE 30 MG: KIT at 08:56

## 2019-06-25 RX ADMIN — GABAPENTIN 600 MG: 300 CAPSULE ORAL at 06:09

## 2019-06-25 RX ADMIN — GABAPENTIN 600 MG: 300 CAPSULE ORAL at 22:50

## 2019-06-25 RX ADMIN — CHLORTHALIDONE 25 MG: 50 TABLET ORAL at 08:57

## 2019-06-25 RX ADMIN — SUCRALFATE 1 G: 1 TABLET ORAL at 20:20

## 2019-06-25 RX ADMIN — CETIRIZINE HYDROCHLORIDE 10 MG: 10 TABLET, FILM COATED ORAL at 20:20

## 2019-06-25 RX ADMIN — POLYETHYLENE GLYCOL (3350) 17 G: 17 POWDER, FOR SOLUTION ORAL at 08:57

## 2019-06-25 RX ADMIN — PROPOFOL 50 MCG/KG/MIN: 10 INJECTION, EMULSION INTRAVENOUS at 06:09

## 2019-06-25 RX ADMIN — OXCARBAZEPINE 600 MG: 300 TABLET, FILM COATED ORAL at 08:56

## 2019-06-25 RX ADMIN — SODIUM CHLORIDE 100 ML/HR: 9 INJECTION, SOLUTION INTRAVENOUS at 22:50

## 2019-06-25 RX ADMIN — PROPOFOL 50 MCG/KG/MIN: 10 INJECTION, EMULSION INTRAVENOUS at 10:02

## 2019-06-25 RX ADMIN — SODIUM CHLORIDE, PRESERVATIVE FREE 3 ML: 5 INJECTION INTRAVENOUS at 20:22

## 2019-06-25 RX ADMIN — BUSPIRONE HYDROCHLORIDE 5 MG: 5 TABLET ORAL at 08:56

## 2019-06-25 RX ADMIN — MORPHINE SULFATE 6 MG: 8 INJECTION INTRAVENOUS at 14:08

## 2019-06-25 RX ADMIN — VANCOMYCIN HYDROCHLORIDE 1500 MG: 5 INJECTION, POWDER, LYOPHILIZED, FOR SOLUTION INTRAVENOUS at 14:13

## 2019-06-25 RX ADMIN — OXCARBAZEPINE 600 MG: 300 TABLET, FILM COATED ORAL at 20:21

## 2019-06-25 RX ADMIN — AMLODIPINE BESYLATE 5 MG: 5 TABLET ORAL at 08:58

## 2019-06-25 RX ADMIN — ALBUTEROL SULFATE 2.5 MG: 2.5 SOLUTION RESPIRATORY (INHALATION) at 18:24

## 2019-06-25 RX ADMIN — SUCRALFATE 1 G: 1 TABLET ORAL at 08:56

## 2019-06-25 RX ADMIN — SODIUM CHLORIDE, PRESERVATIVE FREE 3 ML: 5 INJECTION INTRAVENOUS at 09:01

## 2019-06-25 RX ADMIN — FENTANYL CITRATE 50 MCG: 50 INJECTION INTRAMUSCULAR; INTRAVENOUS at 12:31

## 2019-06-26 PROCEDURE — 25010000002 MORPHINE PER 10 MG: Performed by: SURGERY

## 2019-06-26 PROCEDURE — 94799 UNLISTED PULMONARY SVC/PX: CPT

## 2019-06-26 PROCEDURE — 25010000002 ENOXAPARIN PER 10 MG: Performed by: SURGERY

## 2019-06-26 RX ORDER — PANTOPRAZOLE SODIUM 40 MG/1
40 TABLET, DELAYED RELEASE ORAL
Status: DISCONTINUED | OUTPATIENT
Start: 2019-06-26 | End: 2019-06-26

## 2019-06-26 RX ADMIN — LINAGLIPTIN 5 MG: 5 TABLET, FILM COATED ORAL at 08:35

## 2019-06-26 RX ADMIN — CETIRIZINE HYDROCHLORIDE 10 MG: 10 TABLET, FILM COATED ORAL at 21:43

## 2019-06-26 RX ADMIN — GABAPENTIN 600 MG: 300 CAPSULE ORAL at 21:41

## 2019-06-26 RX ADMIN — SODIUM CHLORIDE, PRESERVATIVE FREE 3 ML: 5 INJECTION INTRAVENOUS at 08:36

## 2019-06-26 RX ADMIN — ENOXAPARIN SODIUM 40 MG: 40 INJECTION SUBCUTANEOUS at 08:35

## 2019-06-26 RX ADMIN — BUSPIRONE HYDROCHLORIDE 5 MG: 5 TABLET ORAL at 08:35

## 2019-06-26 RX ADMIN — SUCRALFATE 1 G: 1 TABLET ORAL at 11:31

## 2019-06-26 RX ADMIN — MONTELUKAST SODIUM 10 MG: 10 TABLET, FILM COATED ORAL at 21:41

## 2019-06-26 RX ADMIN — LANSOPRAZOLE 30 MG: KIT at 08:35

## 2019-06-26 RX ADMIN — ALBUTEROL SULFATE 2.5 MG: 2.5 SOLUTION RESPIRATORY (INHALATION) at 06:21

## 2019-06-26 RX ADMIN — BUSPIRONE HYDROCHLORIDE 5 MG: 5 TABLET ORAL at 21:43

## 2019-06-26 RX ADMIN — MORPHINE SULFATE 6 MG: 8 INJECTION INTRAVENOUS at 05:13

## 2019-06-26 RX ADMIN — ONDANSETRON 4 MG: 4 TABLET, FILM COATED ORAL at 21:41

## 2019-06-26 RX ADMIN — TRAZODONE HYDROCHLORIDE 100 MG: 50 TABLET ORAL at 21:41

## 2019-06-26 RX ADMIN — FLUOXETINE HYDROCHLORIDE 10 MG: 10 CAPSULE ORAL at 08:35

## 2019-06-26 RX ADMIN — POLYETHYLENE GLYCOL (3350) 17 G: 17 POWDER, FOR SOLUTION ORAL at 08:35

## 2019-06-26 RX ADMIN — OXCARBAZEPINE 600 MG: 300 TABLET, FILM COATED ORAL at 08:34

## 2019-06-26 RX ADMIN — SUCRALFATE 1 G: 1 TABLET ORAL at 08:35

## 2019-06-26 RX ADMIN — AMLODIPINE BESYLATE 5 MG: 5 TABLET ORAL at 08:35

## 2019-06-26 RX ADMIN — SODIUM CHLORIDE 100 ML/HR: 9 INJECTION, SOLUTION INTRAVENOUS at 08:35

## 2019-06-26 RX ADMIN — MORPHINE SULFATE 6 MG: 8 INJECTION INTRAVENOUS at 08:53

## 2019-06-26 RX ADMIN — ALBUTEROL SULFATE 2.5 MG: 2.5 SOLUTION RESPIRATORY (INHALATION) at 19:43

## 2019-06-26 RX ADMIN — OXYCODONE HYDROCHLORIDE 10 MG: 5 TABLET ORAL at 21:56

## 2019-06-26 RX ADMIN — MORPHINE SULFATE 6 MG: 8 INJECTION INTRAVENOUS at 19:33

## 2019-06-26 RX ADMIN — SODIUM CHLORIDE 125 ML/HR: 9 INJECTION, SOLUTION INTRAVENOUS at 19:03

## 2019-06-26 RX ADMIN — GABAPENTIN 600 MG: 300 CAPSULE ORAL at 06:18

## 2019-06-26 RX ADMIN — MORPHINE SULFATE 6 MG: 8 INJECTION INTRAVENOUS at 11:39

## 2019-06-26 RX ADMIN — OXCARBAZEPINE 600 MG: 300 TABLET, FILM COATED ORAL at 21:41

## 2019-06-26 RX ADMIN — CHLORTHALIDONE 25 MG: 50 TABLET ORAL at 08:36

## 2019-06-26 RX ADMIN — SODIUM CHLORIDE, PRESERVATIVE FREE 3 ML: 5 INJECTION INTRAVENOUS at 21:43

## 2019-06-27 LAB
ALBUMIN SERPL-MCNC: 2.55 G/DL (ref 3.5–5.2)
ALBUMIN/GLOB SERPL: 0.8 G/DL
ALP SERPL-CCNC: 119 U/L (ref 39–117)
ALT SERPL W P-5'-P-CCNC: 51 U/L (ref 1–33)
ANION GAP SERPL CALCULATED.3IONS-SCNC: 9.4 MMOL/L (ref 5–15)
AST SERPL-CCNC: 36 U/L (ref 1–32)
BASOPHILS # BLD AUTO: 0.02 10*3/MM3 (ref 0–0.2)
BASOPHILS NFR BLD AUTO: 0.2 % (ref 0–1.5)
BILIRUB SERPL-MCNC: 0.4 MG/DL (ref 0.2–1.2)
BUN BLD-MCNC: 2 MG/DL (ref 8–23)
BUN/CREAT SERPL: 3.8 (ref 7–25)
CALCIUM SPEC-SCNC: 7.3 MG/DL (ref 8.6–10.5)
CHLORIDE SERPL-SCNC: 93 MMOL/L (ref 98–107)
CO2 SERPL-SCNC: 31.6 MMOL/L (ref 22–29)
CREAT BLD-MCNC: 0.53 MG/DL (ref 0.57–1)
DEPRECATED RDW RBC AUTO: 53.2 FL (ref 37–54)
EOSINOPHIL # BLD AUTO: 0.02 10*3/MM3 (ref 0–0.4)
EOSINOPHIL NFR BLD AUTO: 0.2 % (ref 0.3–6.2)
ERYTHROCYTE [DISTWIDTH] IN BLOOD BY AUTOMATED COUNT: 16.8 % (ref 12.3–15.4)
GFR SERPL CREATININE-BSD FRML MDRD: 117 ML/MIN/1.73
GLOBULIN UR ELPH-MCNC: 3.3 GM/DL
GLUCOSE BLD-MCNC: 215 MG/DL (ref 65–99)
GLUCOSE BLDC GLUCOMTR-MCNC: 258 MG/DL (ref 70–130)
HCT VFR BLD AUTO: 33.1 % (ref 34–46.6)
HGB BLD-MCNC: 10.7 G/DL (ref 12–15.9)
IMM GRANULOCYTES # BLD AUTO: 0.02 10*3/MM3 (ref 0–0.05)
IMM GRANULOCYTES NFR BLD AUTO: 0.2 % (ref 0–0.5)
LYMPHOCYTES # BLD AUTO: 1.32 10*3/MM3 (ref 0.7–3.1)
LYMPHOCYTES NFR BLD AUTO: 12.8 % (ref 19.6–45.3)
MCH RBC QN AUTO: 27.6 PG (ref 26.6–33)
MCHC RBC AUTO-ENTMCNC: 32.3 G/DL (ref 31.5–35.7)
MCV RBC AUTO: 85.3 FL (ref 79–97)
MONOCYTES # BLD AUTO: 1.26 10*3/MM3 (ref 0.1–0.9)
MONOCYTES NFR BLD AUTO: 12.2 % (ref 5–12)
NEUTROPHILS # BLD AUTO: 7.67 10*3/MM3 (ref 1.7–7)
NEUTROPHILS NFR BLD AUTO: 74.4 % (ref 42.7–76)
PLATELET # BLD AUTO: 227 10*3/MM3 (ref 140–450)
PMV BLD AUTO: 9.1 FL (ref 6–12)
POTASSIUM BLD-SCNC: 2.7 MMOL/L (ref 3.5–5.2)
PROT SERPL-MCNC: 5.8 G/DL (ref 6–8.5)
RBC # BLD AUTO: 3.88 10*6/MM3 (ref 3.77–5.28)
SODIUM BLD-SCNC: 134 MMOL/L (ref 136–145)
WBC NRBC COR # BLD: 10.31 10*3/MM3 (ref 3.4–10.8)

## 2019-06-27 PROCEDURE — 94799 UNLISTED PULMONARY SVC/PX: CPT

## 2019-06-27 PROCEDURE — 25010000002 MORPHINE PER 10 MG: Performed by: SURGERY

## 2019-06-27 PROCEDURE — 99024 POSTOP FOLLOW-UP VISIT: CPT | Performed by: SURGERY

## 2019-06-27 PROCEDURE — 93010 ELECTROCARDIOGRAM REPORT: CPT | Performed by: INTERNAL MEDICINE

## 2019-06-27 PROCEDURE — 82962 GLUCOSE BLOOD TEST: CPT

## 2019-06-27 PROCEDURE — 85025 COMPLETE CBC W/AUTO DIFF WBC: CPT | Performed by: SURGERY

## 2019-06-27 PROCEDURE — 80053 COMPREHEN METABOLIC PANEL: CPT | Performed by: SURGERY

## 2019-06-27 PROCEDURE — 93005 ELECTROCARDIOGRAM TRACING: CPT | Performed by: SURGERY

## 2019-06-27 PROCEDURE — 25010000002 ENOXAPARIN PER 10 MG: Performed by: SURGERY

## 2019-06-27 RX ORDER — POTASSIUM CHLORIDE 7.45 MG/ML
10 INJECTION INTRAVENOUS
Status: DISCONTINUED | OUTPATIENT
Start: 2019-06-27 | End: 2019-07-01 | Stop reason: HOSPADM

## 2019-06-27 RX ORDER — POTASSIUM CHLORIDE 1.5 G/1.77G
40 POWDER, FOR SOLUTION ORAL AS NEEDED
Status: DISCONTINUED | OUTPATIENT
Start: 2019-06-27 | End: 2019-07-01 | Stop reason: HOSPADM

## 2019-06-27 RX ORDER — POTASSIUM CHLORIDE 20 MEQ/1
40 TABLET, EXTENDED RELEASE ORAL AS NEEDED
Status: DISCONTINUED | OUTPATIENT
Start: 2019-06-27 | End: 2019-07-01 | Stop reason: HOSPADM

## 2019-06-27 RX ORDER — POTASSIUM CHLORIDE 20 MEQ/1
40 TABLET, EXTENDED RELEASE ORAL EVERY 4 HOURS
Status: COMPLETED | OUTPATIENT
Start: 2019-06-27 | End: 2019-06-27

## 2019-06-27 RX ADMIN — ALBUTEROL SULFATE 2.5 MG: 2.5 SOLUTION RESPIRATORY (INHALATION) at 13:44

## 2019-06-27 RX ADMIN — TRAZODONE HYDROCHLORIDE 100 MG: 50 TABLET ORAL at 20:21

## 2019-06-27 RX ADMIN — GABAPENTIN 600 MG: 300 CAPSULE ORAL at 13:34

## 2019-06-27 RX ADMIN — ALBUTEROL SULFATE 2.5 MG: 2.5 SOLUTION RESPIRATORY (INHALATION) at 07:03

## 2019-06-27 RX ADMIN — FLUOXETINE HYDROCHLORIDE 10 MG: 10 CAPSULE ORAL at 09:59

## 2019-06-27 RX ADMIN — POLYETHYLENE GLYCOL (3350) 17 G: 17 POWDER, FOR SOLUTION ORAL at 10:02

## 2019-06-27 RX ADMIN — SODIUM CHLORIDE 125 ML/HR: 9 INJECTION, SOLUTION INTRAVENOUS at 11:44

## 2019-06-27 RX ADMIN — AMLODIPINE BESYLATE 5 MG: 5 TABLET ORAL at 10:01

## 2019-06-27 RX ADMIN — MONTELUKAST SODIUM 10 MG: 10 TABLET, FILM COATED ORAL at 20:21

## 2019-06-27 RX ADMIN — MORPHINE SULFATE 6 MG: 8 INJECTION INTRAVENOUS at 02:55

## 2019-06-27 RX ADMIN — SODIUM CHLORIDE, PRESERVATIVE FREE 3 ML: 5 INJECTION INTRAVENOUS at 10:02

## 2019-06-27 RX ADMIN — BUSPIRONE HYDROCHLORIDE 5 MG: 5 TABLET ORAL at 09:59

## 2019-06-27 RX ADMIN — SODIUM CHLORIDE 125 ML/HR: 9 INJECTION, SOLUTION INTRAVENOUS at 20:20

## 2019-06-27 RX ADMIN — LINAGLIPTIN 5 MG: 5 TABLET, FILM COATED ORAL at 09:59

## 2019-06-27 RX ADMIN — CETIRIZINE HYDROCHLORIDE 10 MG: 10 TABLET, FILM COATED ORAL at 20:21

## 2019-06-27 RX ADMIN — GABAPENTIN 600 MG: 300 CAPSULE ORAL at 22:31

## 2019-06-27 RX ADMIN — SODIUM CHLORIDE, PRESERVATIVE FREE 3 ML: 5 INJECTION INTRAVENOUS at 20:21

## 2019-06-27 RX ADMIN — ENOXAPARIN SODIUM 40 MG: 40 INJECTION SUBCUTANEOUS at 10:02

## 2019-06-27 RX ADMIN — BUSPIRONE HYDROCHLORIDE 5 MG: 5 TABLET ORAL at 20:21

## 2019-06-27 RX ADMIN — OXCARBAZEPINE 600 MG: 300 TABLET, FILM COATED ORAL at 20:21

## 2019-06-27 RX ADMIN — MORPHINE SULFATE 6 MG: 8 INJECTION INTRAVENOUS at 17:40

## 2019-06-27 RX ADMIN — CHLORTHALIDONE 25 MG: 50 TABLET ORAL at 10:00

## 2019-06-27 RX ADMIN — OXYCODONE HYDROCHLORIDE 10 MG: 5 TABLET ORAL at 13:35

## 2019-06-27 RX ADMIN — SODIUM CHLORIDE 125 ML/HR: 9 INJECTION, SOLUTION INTRAVENOUS at 03:01

## 2019-06-27 RX ADMIN — POTASSIUM CHLORIDE 40 MEQ: 1500 TABLET, EXTENDED RELEASE ORAL at 11:44

## 2019-06-27 RX ADMIN — POTASSIUM CHLORIDE 40 MEQ: 1500 TABLET, EXTENDED RELEASE ORAL at 17:33

## 2019-06-27 RX ADMIN — POTASSIUM CHLORIDE 40 MEQ: 1500 TABLET, EXTENDED RELEASE ORAL at 20:21

## 2019-06-28 ENCOUNTER — APPOINTMENT (OUTPATIENT)
Dept: GENERAL RADIOLOGY | Facility: HOSPITAL | Age: 64
End: 2019-06-28

## 2019-06-28 LAB
DEPRECATED RDW RBC AUTO: 50.1 FL (ref 37–54)
ERYTHROCYTE [DISTWIDTH] IN BLOOD BY AUTOMATED COUNT: 16.3 % (ref 12.3–15.4)
GLUCOSE BLDC GLUCOMTR-MCNC: 124 MG/DL (ref 70–130)
GLUCOSE BLDC GLUCOMTR-MCNC: 181 MG/DL (ref 70–130)
HCT VFR BLD AUTO: 31.5 % (ref 34–46.6)
HGB BLD-MCNC: 10.3 G/DL (ref 12–15.9)
MCH RBC QN AUTO: 27.3 PG (ref 26.6–33)
MCHC RBC AUTO-ENTMCNC: 32.7 G/DL (ref 31.5–35.7)
MCV RBC AUTO: 83.6 FL (ref 79–97)
PLATELET # BLD AUTO: 240 10*3/MM3 (ref 140–450)
PMV BLD AUTO: 8.9 FL (ref 6–12)
POTASSIUM BLD-SCNC: 4.2 MMOL/L (ref 3.5–5.2)
RBC # BLD AUTO: 3.77 10*6/MM3 (ref 3.77–5.28)
WBC NRBC COR # BLD: 8.56 10*3/MM3 (ref 3.4–10.8)

## 2019-06-28 PROCEDURE — 25010000002 ENOXAPARIN PER 10 MG: Performed by: SURGERY

## 2019-06-28 PROCEDURE — 85027 COMPLETE CBC AUTOMATED: CPT | Performed by: SURGERY

## 2019-06-28 PROCEDURE — 99223 1ST HOSP IP/OBS HIGH 75: CPT | Performed by: INTERNAL MEDICINE

## 2019-06-28 PROCEDURE — 93010 ELECTROCARDIOGRAM REPORT: CPT | Performed by: INTERNAL MEDICINE

## 2019-06-28 PROCEDURE — 63710000001 INSULIN ASPART PER 5 UNITS: Performed by: INTERNAL MEDICINE

## 2019-06-28 PROCEDURE — 71045 X-RAY EXAM CHEST 1 VIEW: CPT

## 2019-06-28 PROCEDURE — 99024 POSTOP FOLLOW-UP VISIT: CPT | Performed by: SURGERY

## 2019-06-28 PROCEDURE — 71045 X-RAY EXAM CHEST 1 VIEW: CPT | Performed by: RADIOLOGY

## 2019-06-28 PROCEDURE — 84132 ASSAY OF SERUM POTASSIUM: CPT | Performed by: SURGERY

## 2019-06-28 PROCEDURE — 82962 GLUCOSE BLOOD TEST: CPT

## 2019-06-28 PROCEDURE — 25010000002 FUROSEMIDE PER 20 MG: Performed by: INTERNAL MEDICINE

## 2019-06-28 PROCEDURE — 94799 UNLISTED PULMONARY SVC/PX: CPT

## 2019-06-28 PROCEDURE — 93005 ELECTROCARDIOGRAM TRACING: CPT | Performed by: SURGERY

## 2019-06-28 PROCEDURE — 25010000002 HYDRALAZINE PER 20 MG: Performed by: INTERNAL MEDICINE

## 2019-06-28 RX ORDER — FUROSEMIDE 10 MG/ML
20 INJECTION INTRAMUSCULAR; INTRAVENOUS ONCE
Status: COMPLETED | OUTPATIENT
Start: 2019-06-28 | End: 2019-06-28

## 2019-06-28 RX ORDER — NICOTINE POLACRILEX 4 MG
15 LOZENGE BUCCAL
Status: DISCONTINUED | OUTPATIENT
Start: 2019-06-28 | End: 2019-07-01 | Stop reason: HOSPADM

## 2019-06-28 RX ORDER — HYDRALAZINE HYDROCHLORIDE 20 MG/ML
10 INJECTION INTRAMUSCULAR; INTRAVENOUS EVERY 6 HOURS PRN
Status: DISCONTINUED | OUTPATIENT
Start: 2019-06-28 | End: 2019-07-01 | Stop reason: HOSPADM

## 2019-06-28 RX ORDER — CLONIDINE HYDROCHLORIDE 0.1 MG/1
0.1 TABLET ORAL EVERY 12 HOURS SCHEDULED
Status: DISCONTINUED | OUTPATIENT
Start: 2019-06-28 | End: 2019-06-30

## 2019-06-28 RX ORDER — DEXTROSE MONOHYDRATE 25 G/50ML
25 INJECTION, SOLUTION INTRAVENOUS
Status: DISCONTINUED | OUTPATIENT
Start: 2019-06-28 | End: 2019-07-01 | Stop reason: HOSPADM

## 2019-06-28 RX ADMIN — HYDRALAZINE HYDROCHLORIDE 10 MG: 20 INJECTION INTRAMUSCULAR; INTRAVENOUS at 16:46

## 2019-06-28 RX ADMIN — LINAGLIPTIN 5 MG: 5 TABLET, FILM COATED ORAL at 09:43

## 2019-06-28 RX ADMIN — SODIUM CHLORIDE 125 ML/HR: 9 INJECTION, SOLUTION INTRAVENOUS at 08:05

## 2019-06-28 RX ADMIN — OXYCODONE HYDROCHLORIDE 10 MG: 5 TABLET ORAL at 12:03

## 2019-06-28 RX ADMIN — AMLODIPINE BESYLATE 5 MG: 5 TABLET ORAL at 09:43

## 2019-06-28 RX ADMIN — BUSPIRONE HYDROCHLORIDE 5 MG: 5 TABLET ORAL at 09:43

## 2019-06-28 RX ADMIN — ALBUTEROL SULFATE 2.5 MG: 2.5 SOLUTION RESPIRATORY (INHALATION) at 04:05

## 2019-06-28 RX ADMIN — FLUOXETINE HYDROCHLORIDE 10 MG: 10 CAPSULE ORAL at 09:43

## 2019-06-28 RX ADMIN — GABAPENTIN 600 MG: 300 CAPSULE ORAL at 05:50

## 2019-06-28 RX ADMIN — CLONIDINE HYDROCHLORIDE 0.1 MG: 0.1 TABLET ORAL at 21:06

## 2019-06-28 RX ADMIN — ENOXAPARIN SODIUM 40 MG: 40 INJECTION SUBCUTANEOUS at 09:43

## 2019-06-28 RX ADMIN — MONTELUKAST SODIUM 10 MG: 10 TABLET, FILM COATED ORAL at 21:01

## 2019-06-28 RX ADMIN — TRAZODONE HYDROCHLORIDE 100 MG: 50 TABLET ORAL at 21:01

## 2019-06-28 RX ADMIN — CETIRIZINE HYDROCHLORIDE 10 MG: 10 TABLET, FILM COATED ORAL at 21:01

## 2019-06-28 RX ADMIN — OXCARBAZEPINE 600 MG: 300 TABLET, FILM COATED ORAL at 21:01

## 2019-06-28 RX ADMIN — INSULIN ASPART 2 UNITS: 100 INJECTION, SOLUTION INTRAVENOUS; SUBCUTANEOUS at 16:46

## 2019-06-28 RX ADMIN — GABAPENTIN 600 MG: 300 CAPSULE ORAL at 21:00

## 2019-06-28 RX ADMIN — METOPROLOL TARTRATE 25 MG: 25 TABLET, FILM COATED ORAL at 15:04

## 2019-06-28 RX ADMIN — BUSPIRONE HYDROCHLORIDE 5 MG: 5 TABLET ORAL at 21:01

## 2019-06-28 RX ADMIN — SODIUM CHLORIDE, PRESERVATIVE FREE 3 ML: 5 INJECTION INTRAVENOUS at 09:44

## 2019-06-28 RX ADMIN — GABAPENTIN 600 MG: 300 CAPSULE ORAL at 15:04

## 2019-06-28 RX ADMIN — FUROSEMIDE 20 MG: 10 INJECTION, SOLUTION INTRAMUSCULAR; INTRAVENOUS at 15:04

## 2019-06-28 RX ADMIN — SODIUM CHLORIDE, PRESERVATIVE FREE 3 ML: 5 INJECTION INTRAVENOUS at 21:02

## 2019-06-28 RX ADMIN — CHLORTHALIDONE 25 MG: 50 TABLET ORAL at 09:43

## 2019-06-29 LAB
ALBUMIN SERPL-MCNC: 2.68 G/DL (ref 3.5–5.2)
ALBUMIN/GLOB SERPL: 0.8 G/DL
ALP SERPL-CCNC: 128 U/L (ref 39–117)
ALT SERPL W P-5'-P-CCNC: 30 U/L (ref 1–33)
ANION GAP SERPL CALCULATED.3IONS-SCNC: 11.9 MMOL/L (ref 5–15)
AST SERPL-CCNC: 22 U/L (ref 1–32)
BASOPHILS # BLD AUTO: 0.01 10*3/MM3 (ref 0–0.2)
BASOPHILS NFR BLD AUTO: 0.1 % (ref 0–1.5)
BILIRUB SERPL-MCNC: 0.4 MG/DL (ref 0.2–1.2)
BUN BLD-MCNC: <2 MG/DL (ref 8–23)
BUN/CREAT SERPL: ABNORMAL (ref 7–25)
CALCIUM SPEC-SCNC: 8.3 MG/DL (ref 8.6–10.5)
CHLORIDE SERPL-SCNC: 88 MMOL/L (ref 98–107)
CO2 SERPL-SCNC: 35.1 MMOL/L (ref 22–29)
CREAT BLD-MCNC: 0.42 MG/DL (ref 0.57–1)
DEPRECATED RDW RBC AUTO: 50.6 FL (ref 37–54)
EOSINOPHIL # BLD AUTO: 0 10*3/MM3 (ref 0–0.4)
EOSINOPHIL NFR BLD AUTO: 0 % (ref 0.3–6.2)
ERYTHROCYTE [DISTWIDTH] IN BLOOD BY AUTOMATED COUNT: 16.1 % (ref 12.3–15.4)
GFR SERPL CREATININE-BSD FRML MDRD: >150 ML/MIN/1.73
GLOBULIN UR ELPH-MCNC: 3.4 GM/DL
GLUCOSE BLD-MCNC: 160 MG/DL (ref 65–99)
GLUCOSE BLDC GLUCOMTR-MCNC: 144 MG/DL (ref 70–130)
GLUCOSE BLDC GLUCOMTR-MCNC: 148 MG/DL (ref 70–130)
GLUCOSE BLDC GLUCOMTR-MCNC: 220 MG/DL (ref 70–130)
GLUCOSE BLDC GLUCOMTR-MCNC: 236 MG/DL (ref 70–130)
HCT VFR BLD AUTO: 34.9 % (ref 34–46.6)
HGB BLD-MCNC: 11.1 G/DL (ref 12–15.9)
IMM GRANULOCYTES # BLD AUTO: 0.02 10*3/MM3 (ref 0–0.05)
IMM GRANULOCYTES NFR BLD AUTO: 0.2 % (ref 0–0.5)
LYMPHOCYTES # BLD AUTO: 0.85 10*3/MM3 (ref 0.7–3.1)
LYMPHOCYTES NFR BLD AUTO: 10.1 % (ref 19.6–45.3)
MAGNESIUM SERPL-MCNC: 1.3 MG/DL (ref 1.6–2.4)
MCH RBC QN AUTO: 27.3 PG (ref 26.6–33)
MCHC RBC AUTO-ENTMCNC: 31.8 G/DL (ref 31.5–35.7)
MCV RBC AUTO: 85.7 FL (ref 79–97)
MONOCYTES # BLD AUTO: 0.98 10*3/MM3 (ref 0.1–0.9)
MONOCYTES NFR BLD AUTO: 11.7 % (ref 5–12)
NEUTROPHILS # BLD AUTO: 6.52 10*3/MM3 (ref 1.7–7)
NEUTROPHILS NFR BLD AUTO: 77.9 % (ref 42.7–76)
PLATELET # BLD AUTO: 280 10*3/MM3 (ref 140–450)
PMV BLD AUTO: 8.6 FL (ref 6–12)
POTASSIUM BLD-SCNC: 2.6 MMOL/L (ref 3.5–5.2)
POTASSIUM BLD-SCNC: 3.3 MMOL/L (ref 3.5–5.2)
PROT SERPL-MCNC: 6.1 G/DL (ref 6–8.5)
RBC # BLD AUTO: 4.07 10*6/MM3 (ref 3.77–5.28)
SODIUM BLD-SCNC: 135 MMOL/L (ref 136–145)
TSH SERPL DL<=0.05 MIU/L-ACNC: 2.81 MIU/ML (ref 0.27–4.2)
WBC NRBC COR # BLD: 8.38 10*3/MM3 (ref 3.4–10.8)

## 2019-06-29 PROCEDURE — 94799 UNLISTED PULMONARY SVC/PX: CPT

## 2019-06-29 PROCEDURE — 85025 COMPLETE CBC W/AUTO DIFF WBC: CPT | Performed by: INTERNAL MEDICINE

## 2019-06-29 PROCEDURE — 25010000003 POTASSIUM CHLORIDE 10 MEQ/100ML SOLUTION: Performed by: SURGERY

## 2019-06-29 PROCEDURE — 99024 POSTOP FOLLOW-UP VISIT: CPT | Performed by: SURGERY

## 2019-06-29 PROCEDURE — 25010000002 MAGNESIUM SULFATE 2 GM/50ML SOLUTION: Performed by: HOSPITALIST

## 2019-06-29 PROCEDURE — 25010000002 ENOXAPARIN PER 10 MG: Performed by: SURGERY

## 2019-06-29 PROCEDURE — 25010000002 HYDRALAZINE PER 20 MG: Performed by: INTERNAL MEDICINE

## 2019-06-29 PROCEDURE — 99233 SBSQ HOSP IP/OBS HIGH 50: CPT | Performed by: INTERNAL MEDICINE

## 2019-06-29 PROCEDURE — 84443 ASSAY THYROID STIM HORMONE: CPT | Performed by: INTERNAL MEDICINE

## 2019-06-29 PROCEDURE — 82962 GLUCOSE BLOOD TEST: CPT

## 2019-06-29 PROCEDURE — 80053 COMPREHEN METABOLIC PANEL: CPT | Performed by: INTERNAL MEDICINE

## 2019-06-29 PROCEDURE — 84132 ASSAY OF SERUM POTASSIUM: CPT | Performed by: SURGERY

## 2019-06-29 PROCEDURE — 94002 VENT MGMT INPAT INIT DAY: CPT

## 2019-06-29 PROCEDURE — 83735 ASSAY OF MAGNESIUM: CPT | Performed by: INTERNAL MEDICINE

## 2019-06-29 PROCEDURE — 63710000001 INSULIN ASPART PER 5 UNITS: Performed by: INTERNAL MEDICINE

## 2019-06-29 RX ORDER — MAGNESIUM SULFATE HEPTAHYDRATE 40 MG/ML
4 INJECTION, SOLUTION INTRAVENOUS AS NEEDED
Status: DISCONTINUED | OUTPATIENT
Start: 2019-06-29 | End: 2019-07-01 | Stop reason: HOSPADM

## 2019-06-29 RX ORDER — POTASSIUM CHLORIDE 7.45 MG/ML
10 INJECTION INTRAVENOUS
Status: COMPLETED | OUTPATIENT
Start: 2019-06-29 | End: 2019-06-29

## 2019-06-29 RX ORDER — POTASSIUM CHLORIDE 20 MEQ/1
40 TABLET, EXTENDED RELEASE ORAL EVERY 4 HOURS
Status: COMPLETED | OUTPATIENT
Start: 2019-06-29 | End: 2019-06-30

## 2019-06-29 RX ORDER — MAGNESIUM SULFATE HEPTAHYDRATE 40 MG/ML
2 INJECTION, SOLUTION INTRAVENOUS AS NEEDED
Status: DISCONTINUED | OUTPATIENT
Start: 2019-06-29 | End: 2019-07-01 | Stop reason: HOSPADM

## 2019-06-29 RX ORDER — MAGNESIUM SULFATE HEPTAHYDRATE 40 MG/ML
2 INJECTION, SOLUTION INTRAVENOUS
Status: COMPLETED | OUTPATIENT
Start: 2019-06-29 | End: 2019-06-29

## 2019-06-29 RX ADMIN — POTASSIUM CHLORIDE 10 MEQ: 10 INJECTION, SOLUTION INTRAVENOUS at 02:23

## 2019-06-29 RX ADMIN — POTASSIUM CHLORIDE 10 MEQ: 10 INJECTION, SOLUTION INTRAVENOUS at 06:41

## 2019-06-29 RX ADMIN — INSULIN ASPART 3 UNITS: 100 INJECTION, SOLUTION INTRAVENOUS; SUBCUTANEOUS at 21:35

## 2019-06-29 RX ADMIN — MAGNESIUM SULFATE IN WATER 2 G: 40 INJECTION, SOLUTION INTRAVENOUS at 06:42

## 2019-06-29 RX ADMIN — HYDRALAZINE HYDROCHLORIDE 10 MG: 20 INJECTION INTRAMUSCULAR; INTRAVENOUS at 02:23

## 2019-06-29 RX ADMIN — TRAZODONE HYDROCHLORIDE 100 MG: 50 TABLET ORAL at 20:24

## 2019-06-29 RX ADMIN — CLONIDINE HYDROCHLORIDE 0.1 MG: 0.1 TABLET ORAL at 10:17

## 2019-06-29 RX ADMIN — MAGNESIUM SULFATE IN WATER 2 G: 40 INJECTION, SOLUTION INTRAVENOUS at 04:47

## 2019-06-29 RX ADMIN — BUSPIRONE HYDROCHLORIDE 5 MG: 5 TABLET ORAL at 10:20

## 2019-06-29 RX ADMIN — CHLORTHALIDONE 25 MG: 50 TABLET ORAL at 10:23

## 2019-06-29 RX ADMIN — SODIUM CHLORIDE 50 ML/HR: 9 INJECTION, SOLUTION INTRAVENOUS at 19:15

## 2019-06-29 RX ADMIN — BUSPIRONE HYDROCHLORIDE 5 MG: 5 TABLET ORAL at 20:24

## 2019-06-29 RX ADMIN — OXYCODONE HYDROCHLORIDE 10 MG: 5 TABLET ORAL at 10:27

## 2019-06-29 RX ADMIN — OXYCODONE HYDROCHLORIDE 10 MG: 5 TABLET ORAL at 20:08

## 2019-06-29 RX ADMIN — POTASSIUM CHLORIDE 10 MEQ: 10 INJECTION, SOLUTION INTRAVENOUS at 05:55

## 2019-06-29 RX ADMIN — CLONIDINE HYDROCHLORIDE 0.1 MG: 0.1 TABLET ORAL at 20:24

## 2019-06-29 RX ADMIN — POTASSIUM CHLORIDE 40 MEQ: 1500 TABLET, EXTENDED RELEASE ORAL at 21:35

## 2019-06-29 RX ADMIN — SODIUM CHLORIDE, PRESERVATIVE FREE 3 ML: 5 INJECTION INTRAVENOUS at 10:25

## 2019-06-29 RX ADMIN — OXCARBAZEPINE 600 MG: 300 TABLET, FILM COATED ORAL at 20:24

## 2019-06-29 RX ADMIN — CETIRIZINE HYDROCHLORIDE 10 MG: 10 TABLET, FILM COATED ORAL at 20:24

## 2019-06-29 RX ADMIN — GABAPENTIN 600 MG: 300 CAPSULE ORAL at 21:35

## 2019-06-29 RX ADMIN — ALBUTEROL SULFATE 2.5 MG: 2.5 SOLUTION RESPIRATORY (INHALATION) at 07:21

## 2019-06-29 RX ADMIN — POTASSIUM CHLORIDE 10 MEQ: 10 INJECTION, SOLUTION INTRAVENOUS at 03:49

## 2019-06-29 RX ADMIN — MONTELUKAST SODIUM 10 MG: 10 TABLET, FILM COATED ORAL at 20:24

## 2019-06-29 RX ADMIN — AMLODIPINE BESYLATE 5 MG: 5 TABLET ORAL at 10:22

## 2019-06-29 RX ADMIN — METOPROLOL TARTRATE 25 MG: 25 TABLET, FILM COATED ORAL at 10:18

## 2019-06-29 RX ADMIN — FLUOXETINE HYDROCHLORIDE 10 MG: 10 CAPSULE ORAL at 10:10

## 2019-06-29 RX ADMIN — METOPROLOL TARTRATE 25 MG: 25 TABLET, FILM COATED ORAL at 20:24

## 2019-06-29 RX ADMIN — POTASSIUM CHLORIDE 10 MEQ: 10 INJECTION, SOLUTION INTRAVENOUS at 10:08

## 2019-06-29 RX ADMIN — ENOXAPARIN SODIUM 40 MG: 40 INJECTION SUBCUTANEOUS at 10:16

## 2019-06-29 RX ADMIN — LINAGLIPTIN 5 MG: 5 TABLET, FILM COATED ORAL at 10:13

## 2019-06-29 RX ADMIN — POTASSIUM CHLORIDE 10 MEQ: 10 INJECTION, SOLUTION INTRAVENOUS at 04:47

## 2019-06-29 RX ADMIN — MAGNESIUM SULFATE IN WATER 2 G: 40 INJECTION, SOLUTION INTRAVENOUS at 08:46

## 2019-06-30 LAB
GLUCOSE BLDC GLUCOMTR-MCNC: 116 MG/DL (ref 70–130)
GLUCOSE BLDC GLUCOMTR-MCNC: 158 MG/DL (ref 70–130)
GLUCOSE BLDC GLUCOMTR-MCNC: 247 MG/DL (ref 70–130)
GLUCOSE BLDC GLUCOMTR-MCNC: 274 MG/DL (ref 70–130)
MAGNESIUM SERPL-MCNC: 1.7 MG/DL (ref 1.6–2.4)
POTASSIUM BLD-SCNC: 2.9 MMOL/L (ref 3.5–5.2)
POTASSIUM BLD-SCNC: 3.3 MMOL/L (ref 3.5–5.2)

## 2019-06-30 PROCEDURE — 63710000001 INSULIN ASPART PER 5 UNITS: Performed by: INTERNAL MEDICINE

## 2019-06-30 PROCEDURE — 94799 UNLISTED PULMONARY SVC/PX: CPT

## 2019-06-30 PROCEDURE — 25010000002 MORPHINE PER 10 MG: Performed by: SURGERY

## 2019-06-30 PROCEDURE — 99232 SBSQ HOSP IP/OBS MODERATE 35: CPT | Performed by: INTERNAL MEDICINE

## 2019-06-30 PROCEDURE — 83735 ASSAY OF MAGNESIUM: CPT | Performed by: SURGERY

## 2019-06-30 PROCEDURE — 82962 GLUCOSE BLOOD TEST: CPT

## 2019-06-30 PROCEDURE — 84132 ASSAY OF SERUM POTASSIUM: CPT | Performed by: INTERNAL MEDICINE

## 2019-06-30 PROCEDURE — 99024 POSTOP FOLLOW-UP VISIT: CPT | Performed by: SURGERY

## 2019-06-30 PROCEDURE — 25010000002 ENOXAPARIN PER 10 MG: Performed by: SURGERY

## 2019-06-30 PROCEDURE — 94660 CPAP INITIATION&MGMT: CPT

## 2019-06-30 PROCEDURE — 25010000002 HYDRALAZINE PER 20 MG: Performed by: INTERNAL MEDICINE

## 2019-06-30 PROCEDURE — 84132 ASSAY OF SERUM POTASSIUM: CPT | Performed by: SURGERY

## 2019-06-30 RX ORDER — POTASSIUM CHLORIDE 20 MEQ/1
40 TABLET, EXTENDED RELEASE ORAL EVERY 4 HOURS
Status: COMPLETED | OUTPATIENT
Start: 2019-06-30 | End: 2019-07-01

## 2019-06-30 RX ORDER — POTASSIUM CHLORIDE 20 MEQ/1
40 TABLET, EXTENDED RELEASE ORAL EVERY 4 HOURS
Status: COMPLETED | OUTPATIENT
Start: 2019-06-30 | End: 2019-06-30

## 2019-06-30 RX ORDER — CLONIDINE HYDROCHLORIDE 0.1 MG/1
0.1 TABLET ORAL EVERY 8 HOURS SCHEDULED
Status: DISCONTINUED | OUTPATIENT
Start: 2019-06-30 | End: 2019-06-30

## 2019-06-30 RX ORDER — MAGNESIUM SULFATE HEPTAHYDRATE 40 MG/ML
4 INJECTION, SOLUTION INTRAVENOUS ONCE
Status: COMPLETED | OUTPATIENT
Start: 2019-06-30 | End: 2019-06-30

## 2019-06-30 RX ORDER — CLONIDINE HYDROCHLORIDE 0.1 MG/1
0.1 TABLET ORAL EVERY 12 HOURS SCHEDULED
Status: DISCONTINUED | OUTPATIENT
Start: 2019-06-30 | End: 2019-07-01 | Stop reason: HOSPADM

## 2019-06-30 RX ADMIN — MONTELUKAST SODIUM 10 MG: 10 TABLET, FILM COATED ORAL at 22:02

## 2019-06-30 RX ADMIN — OXCARBAZEPINE 600 MG: 300 TABLET, FILM COATED ORAL at 22:03

## 2019-06-30 RX ADMIN — ALBUTEROL SULFATE 2.5 MG: 2.5 SOLUTION RESPIRATORY (INHALATION) at 07:18

## 2019-06-30 RX ADMIN — CETIRIZINE HYDROCHLORIDE 10 MG: 10 TABLET, FILM COATED ORAL at 22:02

## 2019-06-30 RX ADMIN — POTASSIUM CHLORIDE 40 MEQ: 1500 TABLET, EXTENDED RELEASE ORAL at 22:01

## 2019-06-30 RX ADMIN — ALBUTEROL SULFATE 2.5 MG: 2.5 SOLUTION RESPIRATORY (INHALATION) at 19:23

## 2019-06-30 RX ADMIN — POTASSIUM CHLORIDE 40 MEQ: 1500 TABLET, EXTENDED RELEASE ORAL at 11:35

## 2019-06-30 RX ADMIN — ENOXAPARIN SODIUM 40 MG: 40 INJECTION SUBCUTANEOUS at 08:35

## 2019-06-30 RX ADMIN — MAGNESIUM SULFATE HEPTAHYDRATE 4 G: 40 INJECTION, SOLUTION INTRAVENOUS at 17:30

## 2019-06-30 RX ADMIN — HYDRALAZINE HYDROCHLORIDE 10 MG: 20 INJECTION INTRAMUSCULAR; INTRAVENOUS at 13:30

## 2019-06-30 RX ADMIN — SODIUM CHLORIDE, PRESERVATIVE FREE 3 ML: 5 INJECTION INTRAVENOUS at 08:38

## 2019-06-30 RX ADMIN — TRAZODONE HYDROCHLORIDE 100 MG: 50 TABLET ORAL at 22:02

## 2019-06-30 RX ADMIN — POLYETHYLENE GLYCOL (3350) 17 G: 17 POWDER, FOR SOLUTION ORAL at 08:35

## 2019-06-30 RX ADMIN — BUSPIRONE HYDROCHLORIDE 5 MG: 5 TABLET ORAL at 08:35

## 2019-06-30 RX ADMIN — LINAGLIPTIN 5 MG: 5 TABLET, FILM COATED ORAL at 08:36

## 2019-06-30 RX ADMIN — FLUOXETINE HYDROCHLORIDE 10 MG: 10 CAPSULE ORAL at 08:36

## 2019-06-30 RX ADMIN — INSULIN ASPART 2 UNITS: 100 INJECTION, SOLUTION INTRAVENOUS; SUBCUTANEOUS at 08:33

## 2019-06-30 RX ADMIN — CHLORTHALIDONE 25 MG: 50 TABLET ORAL at 08:36

## 2019-06-30 RX ADMIN — AMLODIPINE BESYLATE 5 MG: 5 TABLET ORAL at 08:36

## 2019-06-30 RX ADMIN — INSULIN ASPART 3 UNITS: 100 INJECTION, SOLUTION INTRAVENOUS; SUBCUTANEOUS at 17:24

## 2019-06-30 RX ADMIN — OXYCODONE HYDROCHLORIDE 10 MG: 5 TABLET ORAL at 22:21

## 2019-06-30 RX ADMIN — OXYCODONE HYDROCHLORIDE 10 MG: 5 TABLET ORAL at 12:29

## 2019-06-30 RX ADMIN — METOPROLOL TARTRATE 25 MG: 25 TABLET, FILM COATED ORAL at 08:35

## 2019-06-30 RX ADMIN — CLONIDINE HYDROCHLORIDE 0.1 MG: 0.1 TABLET ORAL at 22:02

## 2019-06-30 RX ADMIN — ALBUTEROL SULFATE 2.5 MG: 2.5 SOLUTION RESPIRATORY (INHALATION) at 14:46

## 2019-06-30 RX ADMIN — MORPHINE SULFATE 6 MG: 8 INJECTION INTRAVENOUS at 17:23

## 2019-06-30 RX ADMIN — BUSPIRONE HYDROCHLORIDE 5 MG: 5 TABLET ORAL at 22:02

## 2019-06-30 RX ADMIN — POTASSIUM CHLORIDE 40 MEQ: 1500 TABLET, EXTENDED RELEASE ORAL at 14:07

## 2019-06-30 RX ADMIN — CLONIDINE HYDROCHLORIDE 0.1 MG: 0.1 TABLET ORAL at 08:35

## 2019-06-30 RX ADMIN — GABAPENTIN 600 MG: 300 CAPSULE ORAL at 22:04

## 2019-06-30 RX ADMIN — METOPROLOL TARTRATE 25 MG: 25 TABLET, FILM COATED ORAL at 22:04

## 2019-06-30 RX ADMIN — POTASSIUM CHLORIDE 40 MEQ: 1500 TABLET, EXTENDED RELEASE ORAL at 01:40

## 2019-06-30 RX ADMIN — SODIUM CHLORIDE, PRESERVATIVE FREE 3 ML: 5 INJECTION INTRAVENOUS at 22:08

## 2019-06-30 RX ADMIN — INSULIN ASPART 4 UNITS: 100 INJECTION, SOLUTION INTRAVENOUS; SUBCUTANEOUS at 22:01

## 2019-07-01 ENCOUNTER — APPOINTMENT (OUTPATIENT)
Dept: GENERAL RADIOLOGY | Facility: HOSPITAL | Age: 64
End: 2019-07-01

## 2019-07-01 VITALS
HEART RATE: 80 BPM | HEIGHT: 70 IN | RESPIRATION RATE: 14 BRPM | OXYGEN SATURATION: 96 % | BODY MASS INDEX: 33.92 KG/M2 | WEIGHT: 236.9 LBS | DIASTOLIC BLOOD PRESSURE: 64 MMHG | SYSTOLIC BLOOD PRESSURE: 105 MMHG | TEMPERATURE: 98.4 F

## 2019-07-01 LAB
ALBUMIN SERPL-MCNC: 2.7 G/DL (ref 3.5–5.2)
ALBUMIN/GLOB SERPL: 0.8 G/DL
ALP SERPL-CCNC: 107 U/L (ref 39–117)
ALT SERPL W P-5'-P-CCNC: 17 U/L (ref 1–33)
ANION GAP SERPL CALCULATED.3IONS-SCNC: 6.5 MMOL/L (ref 5–15)
AST SERPL-CCNC: 13 U/L (ref 1–32)
BASOPHILS # BLD AUTO: 0.03 10*3/MM3 (ref 0–0.2)
BASOPHILS NFR BLD AUTO: 0.3 % (ref 0–1.5)
BILIRUB SERPL-MCNC: 0.2 MG/DL (ref 0.2–1.2)
BUN BLD-MCNC: 5 MG/DL (ref 8–23)
BUN/CREAT SERPL: 10 (ref 7–25)
CALCIUM SPEC-SCNC: 9.1 MG/DL (ref 8.6–10.5)
CHLORIDE SERPL-SCNC: 94 MMOL/L (ref 98–107)
CO2 SERPL-SCNC: 34.5 MMOL/L (ref 22–29)
CREAT BLD-MCNC: 0.5 MG/DL (ref 0.57–1)
DEPRECATED RDW RBC AUTO: 54.3 FL (ref 37–54)
EOSINOPHIL # BLD AUTO: 0.01 10*3/MM3 (ref 0–0.4)
EOSINOPHIL NFR BLD AUTO: 0.1 % (ref 0.3–6.2)
ERYTHROCYTE [DISTWIDTH] IN BLOOD BY AUTOMATED COUNT: 17 % (ref 12.3–15.4)
GFR SERPL CREATININE-BSD FRML MDRD: 125 ML/MIN/1.73
GLOBULIN UR ELPH-MCNC: 3.2 GM/DL
GLUCOSE BLD-MCNC: 196 MG/DL (ref 65–99)
GLUCOSE BLDC GLUCOMTR-MCNC: 137 MG/DL (ref 70–130)
GLUCOSE BLDC GLUCOMTR-MCNC: 160 MG/DL (ref 70–130)
HCT VFR BLD AUTO: 34.2 % (ref 34–46.6)
HGB BLD-MCNC: 10.5 G/DL (ref 12–15.9)
IMM GRANULOCYTES # BLD AUTO: 0 10*3/MM3 (ref 0–0.05)
IMM GRANULOCYTES NFR BLD AUTO: 0 % (ref 0–0.5)
LYMPHOCYTES # BLD AUTO: 1.5 10*3/MM3 (ref 0.7–3.1)
LYMPHOCYTES NFR BLD AUTO: 17.4 % (ref 19.6–45.3)
MAGNESIUM SERPL-MCNC: 1.9 MG/DL (ref 1.6–2.4)
MCH RBC QN AUTO: 26.9 PG (ref 26.6–33)
MCHC RBC AUTO-ENTMCNC: 30.7 G/DL (ref 31.5–35.7)
MCV RBC AUTO: 87.7 FL (ref 79–97)
MONOCYTES # BLD AUTO: 1.28 10*3/MM3 (ref 0.1–0.9)
MONOCYTES NFR BLD AUTO: 14.8 % (ref 5–12)
NEUTROPHILS # BLD AUTO: 5.82 10*3/MM3 (ref 1.7–7)
NEUTROPHILS NFR BLD AUTO: 67.4 % (ref 42.7–76)
PLATELET # BLD AUTO: 337 10*3/MM3 (ref 140–450)
PMV BLD AUTO: 8.9 FL (ref 6–12)
POTASSIUM BLD-SCNC: 4 MMOL/L (ref 3.5–5.2)
PROT SERPL-MCNC: 5.9 G/DL (ref 6–8.5)
RBC # BLD AUTO: 3.9 10*6/MM3 (ref 3.77–5.28)
SODIUM BLD-SCNC: 135 MMOL/L (ref 136–145)
WBC NRBC COR # BLD: 8.64 10*3/MM3 (ref 3.4–10.8)

## 2019-07-01 PROCEDURE — 94799 UNLISTED PULMONARY SVC/PX: CPT

## 2019-07-01 PROCEDURE — 71045 X-RAY EXAM CHEST 1 VIEW: CPT

## 2019-07-01 PROCEDURE — 80053 COMPREHEN METABOLIC PANEL: CPT | Performed by: INTERNAL MEDICINE

## 2019-07-01 PROCEDURE — 94660 CPAP INITIATION&MGMT: CPT

## 2019-07-01 PROCEDURE — 97162 PT EVAL MOD COMPLEX 30 MIN: CPT

## 2019-07-01 PROCEDURE — 82962 GLUCOSE BLOOD TEST: CPT

## 2019-07-01 PROCEDURE — 25010000002 MORPHINE PER 10 MG: Performed by: SURGERY

## 2019-07-01 PROCEDURE — 99232 SBSQ HOSP IP/OBS MODERATE 35: CPT | Performed by: INTERNAL MEDICINE

## 2019-07-01 PROCEDURE — 85025 COMPLETE CBC W/AUTO DIFF WBC: CPT | Performed by: INTERNAL MEDICINE

## 2019-07-01 PROCEDURE — 71045 X-RAY EXAM CHEST 1 VIEW: CPT | Performed by: RADIOLOGY

## 2019-07-01 PROCEDURE — 25010000002 ENOXAPARIN PER 10 MG: Performed by: SURGERY

## 2019-07-01 PROCEDURE — 63710000001 INSULIN ASPART PER 5 UNITS: Performed by: INTERNAL MEDICINE

## 2019-07-01 PROCEDURE — 83735 ASSAY OF MAGNESIUM: CPT | Performed by: INTERNAL MEDICINE

## 2019-07-01 PROCEDURE — 97116 GAIT TRAINING THERAPY: CPT

## 2019-07-01 RX ORDER — CLONIDINE HYDROCHLORIDE 0.1 MG/1
0.1 TABLET ORAL EVERY 12 HOURS SCHEDULED
Qty: 60 TABLET | Refills: 1 | Status: SHIPPED | OUTPATIENT
Start: 2019-07-01

## 2019-07-01 RX ADMIN — OXYCODONE HYDROCHLORIDE 10 MG: 5 TABLET ORAL at 04:18

## 2019-07-01 RX ADMIN — POTASSIUM CHLORIDE 40 MEQ: 1500 TABLET, EXTENDED RELEASE ORAL at 01:53

## 2019-07-01 RX ADMIN — CLONIDINE HYDROCHLORIDE 0.1 MG: 0.1 TABLET ORAL at 09:34

## 2019-07-01 RX ADMIN — LINAGLIPTIN 5 MG: 5 TABLET, FILM COATED ORAL at 09:34

## 2019-07-01 RX ADMIN — BUSPIRONE HYDROCHLORIDE 5 MG: 5 TABLET ORAL at 09:35

## 2019-07-01 RX ADMIN — AMLODIPINE BESYLATE 5 MG: 5 TABLET ORAL at 09:36

## 2019-07-01 RX ADMIN — CHLORTHALIDONE 25 MG: 50 TABLET ORAL at 09:35

## 2019-07-01 RX ADMIN — MORPHINE SULFATE 6 MG: 8 INJECTION INTRAVENOUS at 06:49

## 2019-07-01 RX ADMIN — FLUOXETINE HYDROCHLORIDE 10 MG: 10 CAPSULE ORAL at 09:34

## 2019-07-01 RX ADMIN — SODIUM CHLORIDE, PRESERVATIVE FREE 3 ML: 5 INJECTION INTRAVENOUS at 09:35

## 2019-07-01 RX ADMIN — METOPROLOL TARTRATE 25 MG: 25 TABLET, FILM COATED ORAL at 09:34

## 2019-07-01 RX ADMIN — INSULIN ASPART 2 UNITS: 100 INJECTION, SOLUTION INTRAVENOUS; SUBCUTANEOUS at 08:05

## 2019-07-01 RX ADMIN — ENOXAPARIN SODIUM 40 MG: 40 INJECTION SUBCUTANEOUS at 09:41

## 2019-07-01 RX ADMIN — POTASSIUM CHLORIDE 40 MEQ: 1500 TABLET, EXTENDED RELEASE ORAL at 05:59

## 2019-07-01 RX ADMIN — OXYCODONE HYDROCHLORIDE 10 MG: 5 TABLET ORAL at 13:05

## 2019-07-01 NOTE — PROGRESS NOTES
HOSPITALIST PROGRESS NOTE    Patient Identification:  Name:  Mary Kay Goldstein  Age:  63 y.o.  Sex:  female  :  1955  MRN:  [id number]  Visit Number:  [id number]  Primary Care Provider:  Lukas Robins DO    Length of stay:  7     HPI: 62 yo female being seen for HTN    Procedures:  -Paraesophageal hernia    Subjective:  The patient was seen this morning. She is resting in bed.The patient denies any cough and/or chest pain. No nausea or vomiting. Patient is anxious to get up and move around some. PT has been consulted.     Present during exam: RONNI Herbert, patient's son    Current Hospital Meds:    amLODIPine 5 mg Oral Daily   busPIRone 5 mg Oral BID   cetirizine 10 mg Oral Nightly   chlorthalidone 25 mg Oral Daily   CloNIDine 0.1 mg Oral Q12H   enoxaparin 40 mg Subcutaneous Daily   FLUoxetine 10 mg Oral Daily   gabapentin 600 mg Oral Q8H   insulin aspart 0-7 Units Subcutaneous 4x Daily AC & at Bedtime   linagliptin 5 mg Oral Daily   metoprolol tartrate 25 mg Oral Q12H   montelukast 10 mg Oral Nightly   OXcarbazepine 600 mg Oral Q12H   polyethylene glycol 17 g Oral Daily   sodium chloride 3 mL Intravenous Q12H   traZODone 100 mg Oral Nightly     Vital Signs  Temp:  [97.8 °F (36.6 °C)-99.9 °F (37.7 °C)] 98.4 °F (36.9 °C)  Heart Rate:  [69-89] 80  Resp:  [13-21] 16  BP: (116-195)/(63-98) 121/82      19  0400 19  0400 19  0400   Weight: 108 kg (238 lb 1.6 oz) 107 kg (236 lb 3.2 oz) 107 kg (236 lb 14.4 oz)     Body mass index is 33.99 kg/m².    Physical exam:  Physical Exam   Constitutional: She is oriented to person, place, and time. She appears well-developed and well-nourished. No distress.   HENT:   Head: Normocephalic and atraumatic.   Mouth/Throat: Oropharynx is clear and moist.   Eyes: Conjunctivae and EOM are normal. Pupils are equal, round, and reactive to light.   Neck: Neck supple. No tracheal deviation present. No thyromegaly present.   Cardiovascular: Normal rate and regular  rhythm. Exam reveals no gallop and no friction rub.   No murmur heard.  Pulmonary/Chest: Breath sounds normal. No respiratory distress. She has no wheezes. She has no rales.   Abdominal: Soft. Bowel sounds are normal. She exhibits no distension. There is no hepatomegaly. There is no tenderness. There is no guarding.   CAROLINE drain in place.   Musculoskeletal: Normal range of motion. She exhibits no tenderness.   Neurological: She is alert and oriented to person, place, and time. No cranial nerve deficit.   Skin: Skin is warm and dry. No rash noted. No erythema.   Psychiatric: She has a normal mood and affect.     Results Review:    Telemetry: Sinus rhythm    Results from last 7 days   Lab Units 07/01/19  0503 06/29/19  0052 06/28/19  1215 06/27/19  0237 06/25/19  0041 06/24/19  1636   WBC 10*3/mm3 8.64 8.38 8.56 10.31 10.69 25.35*   HEMOGLOBIN g/dL 10.5* 11.1* 10.3* 10.7* 12.1 13.1   HEMATOCRIT % 34.2 34.9 31.5* 33.1* 38.3 41.9   PLATELETS 10*3/mm3 337 280 240 227 258 256     Results from last 7 days   Lab Units 07/01/19  0503 06/30/19  1820 06/30/19  0731 06/29/19  1718 06/29/19  0052 06/28/19  0054 06/27/19  0237 06/25/19  0041 06/24/19  1636   SODIUM mmol/L 135*  --   --   --  135*  --  134* 124* 123*   POTASSIUM mmol/L 4.0 2.9* 3.3* 3.3* 2.6* 4.2 2.7* 3.9 3.7   CHLORIDE mmol/L 94*  --   --   --  88*  --  93* 87* 85*   CO2 mmol/L 34.5*  --   --   --  35.1*  --  31.6* 21.1* 19.5*   BUN mg/dL 5*  --   --   --  <2*  --  2* 7* 6*   CREATININE mg/dL 0.50*  --   --   --  0.42*  --  0.53* 0.76 0.86   CALCIUM mg/dL 9.1  --   --   --  8.3*  --  7.3* 7.8* 8.5*   GLUCOSE mg/dL 196*  --   --   --  160*  --  215* 325* 324*     Results from last 7 days   Lab Units 07/01/19  0503 06/29/19  0052 06/27/19  0237 06/25/19  0041 06/24/19  1636   BILIRUBIN mg/dL 0.2 0.4 0.4 0.3 0.3   ALK PHOS U/L 107 128* 119* 114 129*   AST (SGOT) U/L 13 22 36* 86* 106*   ALT (SGPT) U/L 17 30 51* 62* 69*     Results from last 7 days   Lab Units  07/01/19  0503 06/30/19  0731 06/29/19  0052   MAGNESIUM mg/dL 1.9 1.7 1.3*         Assessment/Plan     -Essential hypertension, currently controlled: Continue with patient's home medications for now.    -Hypokalemia and hypomagnesemia that has resolved.    -Right base process that may be pleural effusion and/or postoperative atelectasis: Repeat chest x-ray has been reviewed.  Clinically patient does not appear to have pneumonia.  White blood cell count is within normal limits and patient is afebrile.  Continue to encourage incentive spirometer.    -Generalized weakness and deconditioning: Physical therapy has been consulted and should see the patient today.    -Diabetes mellitus type 2 that is better controlled: Continue with Tradjenta and as needed sliding scale insulin.    -Tachycardia that has resolved.Continue  to monitor on telemetry.    -Status post para-esophageal hernia repair: Further management per primary team.     I discussed the patients findings and my recommendations with patient, family and nursing staff    Disposition  Home when stable; per primary team's discretion    Bryanna Yu DO  07/01/19  1:27 PM

## 2019-07-01 NOTE — PLAN OF CARE
Problem: Pain, Chronic (Adult)  Goal: Acceptable Pain/Comfort Level and Functional Ability  Outcome: Ongoing (interventions implemented as appropriate)      Problem: Skin Injury Risk (Adult)  Goal: Skin Health and Integrity  Outcome: Ongoing (interventions implemented as appropriate)      Problem: Fall Risk (Adult)  Goal: Absence of Fall  Outcome: Ongoing (interventions implemented as appropriate)

## 2019-07-01 NOTE — THERAPY EVALUATION
Acute Care - Physical Therapy Initial Evaluation  BETO Mace     Patient Name: Mary Kay Goldstein  : 1955  MRN: [id number]  Today's Date: 2019   Onset of Illness/Injury or Date of Surgery: 19  Date of Referral to PT: 19  Referring Physician: Dr. Nelson      Admit Date: 2019    Visit Dx:     ICD-10-CM ICD-9-CM   1. GERD (gastroesophageal reflux disease) K21.9 530.81     Patient Active Problem List   Diagnosis   • Epilepsy (CMS/HCC)   • Guillain Barré syndrome (CMS/HCC)   • Nausea and vomiting   • Hematochezia   • Diarrhea   • Hiatal hernia   • History of stomach ulcers   • Paraesophageal hernia   • Epigastric pain   • Internal hemorrhoids   • Gastroesophageal reflux disease     Past Medical History:   Diagnosis Date   • Arthritis    • Asthma    • Blind    • Depression    • Diabetes mellitus (CMS/HCC)    • Elevated cholesterol    • GERD (gastroesophageal reflux disease)    • Hard of hearing     wears a hearing aid in left ear   • Hiatal hernia    • Hypertension    • Low back pain    • PONV (postoperative nausea and vomiting)    • Retinitis pigmentosa     bilat   • Seizures (CMS/HCC)    • Seizures (CMS/HCC)      LAST SEISURE LAST YEAR   • Sleep apnea     cpap   • Stomach ulcer      Past Surgical History:   Procedure Laterality Date   • APPENDECTOMY     • BREAST SURGERY      reduction   • CHOLECYSTECTOMY     • COLON RESECTION      7 YEARS AGO    • COLONOSCOPY      about 7 years ago   • COLONOSCOPY N/A 2019    Procedure: COLONOSCOPY;  Surgeon: Lukas Nelson MD;  Location: [facility];  Service: Gastroenterology   • ENDOSCOPY N/A 2019    Procedure: ESOPHAGOGASTRODUODENOSCOPY;  Surgeon: Lukas Nelson MD;  Location: [facility] OR;  Service: Gastroenterology   • ENDOSCOPY N/A 2019    Procedure: ESOPHAGOGASTRODUODENOSCOPY;  Surgeon: Lukas Nelson MD;  Location: [facility] OR;  Service: General   • EYE SURGERY      cataract bilat   • HYSTERECTOMY     • LAPAROSCOPIC LYSIS OF ADHESIONS N/A  4/29/2019    Procedure: Lysis of adhesions repair of colon enterotomy;  Surgeon: Lukas Nelson MD;  Location: [facility] OR;  Service: General   • NISSEN FUNDOPLICATION N/A 6/24/2019    Procedure: NISSEN FUNDOPLICATION LAPAROSCOPIC;  Surgeon: Lukas Nelson MD;  Location: [facility] OR;  Service: General   • PARAESOPHAGEAL HERNIA REPAIR N/A 6/24/2019    Procedure: PARAESOPHAGEAL HERNIA REPAIR LAPAROSCOPIC;  Surgeon: Lukas Nelson MD;  Location: [facility] OR;  Service: General        PT ASSESSMENT (last 12 hours)      Physical Therapy Evaluation     Row Name 07/01/19 1338          PT Evaluation Time/Intention    Subjective Information  complains of;weakness  -AG     Document Type  evaluation  -AG     Mode of Treatment  individual therapy;physical therapy  -AG     Patient Effort  good  -AG     Symptoms Noted During/After Treatment  fatigue  -AG     Row Name 07/01/19 1338          General Information    Patient Profile Reviewed?  yes  -AG     Onset of Illness/Injury or Date of Surgery  06/24/19  -AG     Referring Physician  Dr. Nelson  -AG     Patient Observations  alert;cooperative;agree to therapy  -AG     Patient/Family Observations  pt. supine in bed PCU bed; telemetry and SpO2 monitor in place.  1L O2 nc.  Patient's son is present.    -AG     Prior Level of Function  independent:;all household mobility independent w/ rollator within home; completely blind  -AG     Equipment Currently Used at Home  bipap/ cpap;rollator  -AG     Pertinent History of Current Functional Problem  pt. admitted with GERD  -AG     Existing Precautions/Restrictions  fall;oxygen therapy device and L/min  -AG     Limitations/Impairments  safety/cognitive;visual  -AG     Equipment Issued to Patient  gait belt  -AG     Risks Reviewed  patient and family:;LOB;dizziness;increased discomfort;change in vital signs  -AG     Benefits Reviewed  patient and family:;improve function;increase independence;increase strength;increase balance;increase  knowledge  -     Barriers to Rehab  previous functional deficit;visual deficit  -     Row Name 07/01/19 1338          Resource/Environmental Concerns    Current Living Arrangements  home/apartment/condo  -     Row Name 07/01/19 1338          Cognitive Assessment/Intervention- PT/OT    Orientation Status (Cognition)  oriented x 3  -AG     Follows Commands (Cognition)  follows one step commands;physical/tactile prompts required;repetition of directions required;verbal cues/prompting required  -     Row Name 07/01/19 1338          Safety Issues, Functional Mobility    Safety Issues Affecting Function (Mobility)  safety precaution awareness;safety precautions follow-through/compliance  -     Row Name 07/01/19 1338          Mobility Assessment/Treatment    Extremity Weight-bearing Status  left lower extremity;right lower extremity  -[facility] Name 07/01/19 1338          Bed Mobility Assessment/Treatment    Bed Mobility Assessment/Treatment  scooting/bridging;supine-sit  -     Scooting/Bridging Garrett (Bed Mobility)  verbal cues;nonverbal cues (demo/gesture);moderate assist (50% patient effort)  -     Supine-Sit Garrett (Bed Mobility)  verbal cues;nonverbal cues (demo/gesture);maximum assist (25% patient effort)  -     Bed Mobility, Safety Issues  decreased use of arms for pushing/pulling;decreased use of legs for bridging/pushing  -     Assistive Device (Bed Mobility)  bed rails;draw sheet  -[facility] Name 07/01/19 1335          Transfer Assessment/Treatment    Transfer Assessment/Treatment  sit-stand transfer;stand-sit transfer;stand pivot/stand step transfer  -     Sit-Stand Garrett (Transfers)  verbal cues;nonverbal cues (demo/gesture);moderate assist (50% patient effort)  -     Stand-Sit Garrett (Transfers)  verbal cues;nonverbal cues (demo/gesture);moderate assist (50% patient effort)  -[facility] Name 07/01/19 1337          Sit-Stand Transfer    Assistive Device  (Sit-Stand Transfers)  walker, front-wheeled  -AG     Row Name 07/01/19 1338          Stand-Sit Transfer    Assistive Device (Stand-Sit Transfers)  walker, front-wheeled  -AG     Row Name 07/01/19 1338          Stand Pivot/Stand Step Transfer    Stand Pivot/Stand Step [place]  verbal cues;nonverbal cues (demo/gesture);moderate assist (50% patient effort)  -     Assistive Device (Stand Pivot Stand Step Transfer)  walker, front-wheeled  -AG     Row Name 07/01/19 1338          Gait/Stairs Assessment/Training    Gait/Stairs Assessment/Training  gait/ambulation independence;gait/ambulation assistive device;distance ambulated;gait pattern;gait deviations  -     [place] Level (Gait)  verbal cues;nonverbal cues (demo/gesture);moderate assist (50% patient effort)  -     Assistive Device (Gait)  walker, front-wheeled  -AG     Distance in Feet (Gait)  5  -AG     Pattern (Gait)  step-to  -AG     Deviations/Abnormal Patterns (Gait)  antalgic;stride length decreased  -AG     Bilateral Gait Deviations  weight shift ability decreased leans posteriorly  -AG     [facility] Name 07/01/19 1338          General ROM    GENERAL ROM COMMENTS  B LE decreased 25%  -[facility] Name 07/01/19 1338          MMT (Manual Muscle Testing)    General MMT Comments  2+/5 B LE  -[facility] Name 07/01/19 1338          Motor Assessment/Intervention    Additional Documentation  Balance (Group);Balance Interventions (Group);Therapeutic Exercise (Group);Therapeutic Exercise Interventions (Group)  -     Row Name 07/01/19 1338          Therapeutic Exercise    Additional Documentation  Therapeutic Exercise (Row)  -[facility] Name 07/01/19 1338          Balance    Balance  static sitting balance;static standing balance;dynamic sitting balance;dynamic standing balance  -[facility] Name 07/01/19 1338          Static Sitting Balance    Level of [place] (Unsupported Sitting, Static Balance)  minimal assist, 75% patient effort leans posteriorly  -AG      Sitting Position (Unsupported Sitting, Static Balance)  sitting in chair;sitting on edge of bed  -AG     Time Able to Maintain Position (Unsupported Sitting, Static Balance)  more than 5 minutes  -AG     Row Name 07/01/19 1338          Static Standing Balance    Level of [place] (Supported Standing, Static Balance)  moderate assist, 50 to 74% patient effort  -AG     Assistive Device Utilized (Supported Standing, Static Balance)  walker, rolling  -AG     Row Name 07/01/19 1338          Sensory Assessment/Intervention    Sensory General Assessment  no sensation deficits identified  -     Row Name 07/01/19 1338          Vision Assessment/Intervention    Visual Impairment/Limitations  WFL  -AG     Row Name 07/01/19 1338          Pain Assessment    Additional Documentation  Pain Scale: FACES Pre/Post-Treatment (Group)  -AG     Row Name 07/01/19 1338          Pain Scale: FACES Pre/Post-Treatment    Pain: FACES Scale, Pretreatment  0-->no hurt  -AG     Pain: FACES Scale, Post-Treatment  0-->no hurt  -AG     Row Name             Wound 06/24/19 1242 Other (See comments) abdomen incision    Wound - Properties Group Date first assessed: 06/24/19  -AS Time first assessed: 1242  -AS Side: Other (See comments)  -AS Location: abdomen  -AS Type: incision  -AS    Row Name 07/01/19 1338          Coping    Observed Emotional State  accepting;calm;cooperative  -AG     Verbalized Emotional State  acceptance  -AG     Row Name 07/01/19 1338          Plan of Care Review    Plan of Care Reviewed With  patient;son  -AG     Row Name 07/01/19 1334          Physical Therapy Clinical Impression    Date of Referral to PT  06/26/19  -AG     PT Diagnosis (PT Clinical Impression)  decr functional mobility  -AG     Prognosis (PT Clinical Impression)  fair to good  -AG     Functional Level at Time of Evaluation (PT Clinical Impression)  Mod A  -AG     Patient/Family Goals Statement (PT Clinical Impression)  return home with son  -AG      Criteria for Skilled Interventions Met (PT Clinical Impression)  yes;treatment indicated  -AG     Pathology/Pathophysiology Noted (Describe Specifically for Each System)  musculoskeletal  -AG     Impairments Found (describe specific impairments)  aerobic capacity/endurance;ergonomics and body mechanics;gait, locomotion, and balance;joint integrity and mobility  -AG     Functional Limitations in Following Categories (Describe Specific Limitations)  self-care;community/leisure  -AG     Rehab Potential (PT Clinical Summary)  good, to achieve stated therapy goals  -AG     Predicted Duration of Therapy (PT)  2 weeks  -AG     Care Plan Review (PT)  evaluation/treatment results reviewed;care plan/treatment goals reviewed;risks/benefits reviewed;current/potential barriers reviewed;patient/other agree to care plan  -AG     Row Name 07/01/19 1338          Vital Signs    Pre Systolic BP Rehab  131  -AG     Pre Treatment Diastolic BP  98  -AG     Intra Systolic BP Rehab  174  -AG     Intra Treatment Diastolic BP  96  -AG     Intra SpO2 (%)  87  -AG     Row Name 07/01/19 1338          Physical Therapy Goals    Bed Mobility Goal Selection (PT)  bed mobility, PT goal 1  -AG     Transfer Goal Selection (PT)  transfer, PT goal 1  -AG     Gait Training Goal Selection (PT)  gait training, PT goal 1  -AG     Row Name 07/01/19 0367          Bed Mobility Goal 1 (PT)    Activity/Assistive Device (Bed Mobility Goal 1, PT)  sit to supine/supine to sit  -AG     [place] Level/Cues Needed (Bed Mobility Goal 1, PT)  tactile cues required;verbal cues required;contact guard assist  -AG     Time Frame (Bed Mobility Goal 1, PT)  by discharge  -AG     Row Name 07/01/19 8845          Transfer Goal 1 (PT)    Activity/Assistive Device (Transfer Goal 1, PT)  sit-to-stand/stand-to-sit;bed-to-chair/chair-to-bed;walker, rolling  -AG     [place] Level/Cues Needed (Transfer Goal 1, PT)  contact guard assist  -AG     Time Frame (Transfer Goal 1, PT)   by discharge  -AG     Row Name 07/01/19 1338          Gait Training Goal 1 (PT)    Activity/Assistive Device (Gait Training Goal 1, PT)  gait (walking locomotion);assistive device use;decrease fall risk;diminish gait deviation;improve balance and speed;increase endurance/gait distance;walker, rolling  -AG     [place] Level (Gait Training Goal 1, PT)  tactile cues required;verbal cues required;contact guard assist  -AG     Distance (Gait Goal 1, PT)  30  -AG     Time Frame (Gait Training Goal 1, PT)  by discharge  -AG     Row Name 07/01/19 1338          Positioning and Restraints    Pre-Treatment Position  in bed  -AG     Post Treatment Position  chair  -AG     In Chair  notified nsg;sitting;call light within reach;encouraged to call for assist;with family/caregiver  -     Row Name 07/01/19 1338          Living Environment    Home Accessibility  wheelchair accessible  -AG       User Key  (r) = Recorded By, (t) = Taken By, (c) = Cosigned By    Initials Name Provider Type     Jazmin White, PT Physical Therapist    AS Radha Steinberg, RN Registered Nurse        Physical Therapy Education     Title: PT OT SLP Therapies (In Progress)     Topic: Physical Therapy (In Progress)     Point: Mobility training (Done)     Learning Progress Summary           Patient Acceptance, E,D, VU,NR by  at 7/1/2019  2:03 PM                   Point: Body mechanics (Done)     Learning Progress Summary           Patient Acceptance, E,D, VU,NR by  at 7/1/2019  2:03 PM                   Point: Precautions (Done)     Learning Progress Summary           Patient Acceptance, E,D, VU,NR by  at 7/1/2019  2:03 PM                               User Key     Initials Effective Dates Name Provider Type [facility] 04/03/18 -  Jazmin White, PT Physical Therapist PT              PT Recommendation and Plan  Planned Therapy Interventions (PT Eval): balance training, bed mobility training, gait training, home exercise program,  patient/family education, postural re-education, strengthening, transfer training  Therapy Frequency (PT Clinical Impression): other (see comments)(3-5 times/ week per priority policy)  Plan of Care Reviewed With: patient, son  Outcome Measures     Row Name 07/01/19 1400             How much help from another person do you currently need...    Turning from your back to your side while in flat bed without using bedrails?  3  -AG      Moving from lying on back to sitting on the side of a flat bed without bedrails?  2  -AG      Moving to and from a bed to a chair (including a wheelchair)?  2  -AG      Standing up from a chair using your arms (e.g., wheelchair, bedside chair)?  2  -AG      Climbing 3-5 steps with a railing?  2  -AG      To walk in hospital room?  2  -AG      AM-PAC 6 Clicks Score  13  -AG         Functional Assessment    Outcome Measure Options  AM-PAC 6 Clicks Basic Mobility (PT)  -AG        User Key  (r) = Recorded By, (t) = Taken By, (c) = Cosigned By    Initials Name Provider Type    Jazmin Butcher, PT Physical Therapist         Time Calculation:   PT Charges     Row Name 07/01/19 1405             Time Calculation    PT Received On  07/01/19  -AG      PT - Next Appointment  07/02/19  -AG      PT Goal Re-Cert Due Date  07/15/19  -         Time Calculation- PT    TCU Minutes- PT  38 min  -AG        User Key  (r) = Recorded By, (t) = Taken By, (c) = Cosigned By    Initials Name Provider Type    Jazmin Butcher, PT Physical Therapist        Therapy Charges for Today     Code Description Service Date Service Provider Modifiers Qty    15851812396 HC PT EVAL MOD COMPLEXITY 2 7/1/2019 Jazmin White, PT GP 1    38432927903 HC PT THER SUPP EA 15 MIN 7/1/2019 Jazmin White, PT GP 2    52825290779 HC GAIT TRAINING EA 15 MIN 7/1/2019 Jazmin White, PT GP 1          PT G-Codes  Outcome Measure Options: AM-PAC 6 Clicks Basic Mobility (PT)  AM-PAC 6 Clicks Score: 13      Jazmin White PT  7/1/2019

## 2019-07-01 NOTE — PROGRESS NOTES
Discharge Planning Assessment   [facility]     Patient Name: Mary Kay Goldstein  MRN: [id number]  Today's Date: 7/1/2019    Admit Date: 6/24/2019      Discharge Plan     Row Name 07/01/19 1226       Plan    Plan  Pt not discharged on Friday. Pt lives with her son and the plan is for her to return home at discharge. Pt has no home health services at this time. Pt refused home health on Friday. SS will follow.     Patient/Family in Agreement with Plan  yes        Expected Discharge Date and Time     Expected Discharge Date Expected Discharge Time    Jun 28, 2019             Yuli Marion

## 2019-07-02 ENCOUNTER — READMISSION MANAGEMENT (OUTPATIENT)
Dept: CALL CENTER | Facility: HOSPITAL | Age: 64
End: 2019-07-02

## 2019-07-02 NOTE — OUTREACH NOTE
Prep Survey      Responses   Facility patient discharged from?  [facility]   Is patient eligible?  Yes   Discharge diagnosis  Laparoscopic repair of paraesophageal hernia with bioabsorbable patch and Nissen fundoplication   Does the patient have one of the following disease processes/diagnoses(primary or secondary)?  General Surgery   Does the patient have Home health ordered?  No   Is there a DME ordered?  No   Comments regarding appointments  see AVS   Medication alerts for this patient  clonidine, hycet, lopressor   General alerts for this patient  wound care   Prep survey completed?  Yes          Nazia Haynes RN

## 2019-07-03 ENCOUNTER — READMISSION MANAGEMENT (OUTPATIENT)
Dept: CALL CENTER | Facility: HOSPITAL | Age: 64
End: 2019-07-03

## 2019-07-03 NOTE — OUTREACH NOTE
General Surgery Week 1 Survey      Responses   Facility patient discharged from?  [facility]   Does the patient have one of the following disease processes/diagnoses(primary or secondary)?  General Surgery   Is there a successful TCM telephone encounter documented?  No   Week 1 attempt successful?  No   Unsuccessful attempts  Attempt 1          Alexander Nieto, RN

## 2019-07-05 ENCOUNTER — READMISSION MANAGEMENT (OUTPATIENT)
Dept: CALL CENTER | Facility: HOSPITAL | Age: 64
End: 2019-07-05

## 2019-07-05 NOTE — OUTREACH NOTE
General Surgery Week 1 Survey      Responses   Facility patient discharged from?  [facility]   Does the patient have one of the following disease processes/diagnoses(primary or secondary)?  General Surgery   Is there a successful TCM telephone encounter documented?  No   Week 1 attempt successful?  No   Unsuccessful attempts  Attempt 2          Sasha Prabhakar RN

## 2019-07-09 ENCOUNTER — OFFICE VISIT (OUTPATIENT)
Dept: SURGERY | Facility: CLINIC | Age: 64
End: 2019-07-09

## 2019-07-09 VITALS
OXYGEN SATURATION: 97 % | SYSTOLIC BLOOD PRESSURE: 142 MMHG | TEMPERATURE: 98.3 F | HEIGHT: 70 IN | BODY MASS INDEX: 29.63 KG/M2 | WEIGHT: 207 LBS | HEART RATE: 60 BPM | DIASTOLIC BLOOD PRESSURE: 80 MMHG | RESPIRATION RATE: 17 BRPM

## 2019-07-09 DIAGNOSIS — K44.9 PARAESOPHAGEAL HERNIA: Primary | ICD-10-CM

## 2019-07-09 PROCEDURE — 99024 POSTOP FOLLOW-UP VISIT: CPT | Performed by: SURGERY

## 2019-07-09 NOTE — DISCHARGE SUMMARY
Date of Discharge:  7/9/2019    Discharge Diagnosis: Paraesophageal hernia with shortening esophagus, gastroesophageal reflux disease    Presenting Problem/History of Present Illness  GERD (gastroesophageal reflux disease) [K21.9]  GERD (gastroesophageal reflux disease) [K21.9]       Hospital Course  Patient 63-year-old white female presented with the above problems.  I performed a combination open laparoscopic paraesophageal hernia repair with patch and a toupee fundoplication with Eliud gastroplasty.  She was discharged home on postoperative day #6, tolerating soft diet  Procedures Performed  Procedure(s):  PARAESOPHAGEAL HERNIA REPAIR LAPAROSCOPIC  Toupee fundoplication  ESOPHAGOGASTRODUODENOSCOPY   Eliud gastroplasty    Consults:  Consults     Date and Time Order Name Status Description    6/28/2019 1154 Inpatient Hospitalist Consult Completed           Condition on Discharge:  Stable    Vital Signs  Temp:  [98.3 °F (36.8 °C)] 98.3 °F (36.8 °C)  Heart Rate:  [60] 60  Resp:  [17] 17  BP: (142)/(80) 142/80    Physical Exam:  Physical Exam:  General :  patient is a 63 y.o. female in no acute distress.    HEENT :  normocephalic, pupils equally round and reactive to light, extraocular motions                   intact.                       Chest:       clear bilaterally.  Equal breath sounds.  No rales or rhonchi    Heart:        regular rate and rhythm.    Abdomen:  soft, nontender.  No distention    :            normal external genitalia    Rectal:       not performed    Extremities: no clubbing cyanosis or edema.  Good femoral, popliteal, dorsalis pedis                           and posterior tibial pulse.    Neurologic: patient oriented ×3.  Cranial  nerves grossly intact.  No sensory, cellebellar,                     or motor dysfunction.      Discharge Disposition  Home or Self Care    Discharge Medications     Discharge Medications      New Medications      Instructions Start Date   CloNIDine 0.1 MG  tablet  Commonly known as:  CATAPRES   0.1 mg, Oral, Every 12 Hours Scheduled      HYDROcodone-acetaminophen 7.5-325 MG/15ML solution  Commonly known as:  HYCET   15 mL, Oral, Every 6 Hours PRN      metoprolol tartrate 25 MG tablet  Commonly known as:  LOPRESSOR   25 mg, Oral, Every 12 Hours Scheduled         Continue These Medications      Instructions Start Date   albuterol sulfate  (90 Base) MCG/ACT inhaler  Commonly known as:  PROVENTIL HFA;VENTOLIN HFA;PROAIR HFA   2 puffs, Inhalation, Every 4 Hours PRN      amLODIPine 5 MG tablet  Commonly known as:  NORVASC   5 mg, Oral, Daily      busPIRone 5 MG tablet  Commonly known as:  BUSPAR   5 mg, Oral, 2 Times Daily      cetirizine 10 MG tablet  Commonly known as:  zyrTEC   10 mg, Oral, Nightly      chlorthalidone 25 MG tablet  Commonly known as:  HYGROTON   25 mg, Oral, Daily      FLUoxetine 10 MG capsule  Commonly known as:  PROzac   10 mg, Oral, Daily      gabapentin 600 MG tablet  Commonly known as:  NEURONTIN   600 mg, Oral, 3 Times Daily      montelukast 10 MG tablet  Commonly known as:  SINGULAIR   10 mg, Oral, Nightly      OXcarbazepine 600 MG tablet  Commonly known as:  TRILEPTAL   600 mg, Oral, 2 Times Daily      oxyCODONE 10 MG tablet  Commonly known as:  ROXICODONE   10 mg, Oral, Every 6 Hours PRN      pantoprazole 40 MG EC tablet  Commonly known as:  PROTONIX   40 mg, Oral, 2 Times Daily      polyethylene glycol packet  Commonly known as:  MIRALAX   17 g, Oral, Daily      SITagliptin 50 MG tablet  Commonly known as:  JANUVIA   50 mg, Oral, 2 Times Daily      sucralfate 1 g tablet  Commonly known as:  CARAFATE   1 g, Oral, 4 Times Daily      traZODone 100 MG tablet  Commonly known as:  DESYREL   100 mg, Oral, Nightly             Discharge Disposition  Patient is discharged home       Lukas Nelson MD  07/09/19  6:48 PM            Dragon disclaimer:  Much of this encounter note is an electronic transcription/translation of spoken language to  printed text. The electronic translation of spoken language may permit erroneous, or at times, nonsensical words or phrases to be inadvertently transcribed; Although I have reviewed the note for such errors, some may still exist.

## 2019-07-09 NOTE — PROGRESS NOTES
7/9/2019    Patient Information  Mary Kay Goldstein  [address]  [place] [address]  1955  [phone] (home)     Chief Complaint   Patient presents with   • Post-op Follow-up     Post Lap Nissen       HPI  Patient is a 63-year-old white female status post lap repair of paraesophageal hernia with patch with Eliud gastroplasty and toupee fundoplication.  She is tolerating a soft diet without difficulty.  She reports her surgery has been helpful with her eating        Patient Active Problem List   Diagnosis   • Epilepsy (CMS/HCC)   • Guillain Barré syndrome (CMS/HCC)   • Nausea and vomiting   • Hematochezia   • Diarrhea   • Hiatal hernia   • History of stomach ulcers   • Paraesophageal hernia   • Epigastric pain   • Internal hemorrhoids   • Gastroesophageal reflux disease         Past Medical History:   Diagnosis Date   • Arthritis    • Asthma    • Blind    • Depression    • Diabetes mellitus (CMS/HCC)    • Elevated cholesterol    • GERD (gastroesophageal reflux disease)    • Hard of hearing     wears a hearing aid in left ear   • Hiatal hernia    • Hypertension    • Low back pain    • PONV (postoperative nausea and vomiting)    • Retinitis pigmentosa     bilat   • Seizures (CMS/HCC)    • Seizures (CMS/HCC)      LAST SEISURE LAST YEAR   • Sleep apnea     cpap   • Stomach ulcer          Past Surgical History:   Procedure Laterality Date   • APPENDECTOMY     • BREAST SURGERY      reduction   • CHOLECYSTECTOMY     • COLON RESECTION      7 YEARS AGO    • COLONOSCOPY      about 7 years ago   • COLONOSCOPY N/A 2/8/2019    Procedure: COLONOSCOPY;  Surgeon: Lukas Nelson MD;  Location: [facility] OR;  Service: Gastroenterology   • ENDOSCOPY N/A 2/8/2019    Procedure: ESOPHAGOGASTRODUODENOSCOPY;  Surgeon: Lukas Nelson MD;  Location: [facility] OR;  Service: Gastroenterology   • ENDOSCOPY N/A 6/24/2019    Procedure: ESOPHAGOGASTRODUODENOSCOPY;  Surgeon: Lukas Nelson MD;  Location: [facility] OR;  Service: General   • EYE  SURGERY      cataract bilat   • HYSTERECTOMY     • LAPAROSCOPIC LYSIS OF ADHESIONS N/A 4/29/2019    Procedure: Lysis of adhesions repair of colon enterotomy;  Surgeon: Lukas Nelson MD;  Location:  [facility] OR;  Service: General   • NISSEN FUNDOPLICATION N/A 6/24/2019    Procedure: NISSEN FUNDOPLICATION LAPAROSCOPIC;  Surgeon: Lukas Nelson MD;  Location:  [facility] OR;  Service: General   • PARAESOPHAGEAL HERNIA REPAIR N/A 6/24/2019    Procedure: PARAESOPHAGEAL HERNIA REPAIR LAPAROSCOPIC;  Surgeon: Lukas Nelson MD;  Location:  [facility] OR;  Service: General         Family History   Problem Relation Age of Onset   • Cancer Mother    • Diabetes Mother    • Hypertension Mother    • Hypertension Father    • Heart disease Father          Social History     Tobacco Use   • Smoking status: Never Smoker   • Smokeless tobacco: Never Used   Substance Use Topics   • Alcohol use: No   • Drug use: No       Current Outpatient Medications   Medication Sig Dispense Refill   • albuterol sulfate  (90 Base) MCG/ACT inhaler Inhale 2 puffs Every 4 (Four) Hours As Needed for Wheezing.     • amLODIPine (NORVASC) 5 MG tablet Take 5 mg by mouth Daily.     • busPIRone (BUSPAR) 5 MG tablet Take 5 mg by mouth 2 (Two) Times a Day.     • cetirizine (zyrTEC) 10 MG tablet Take 10 mg by mouth Every Night.     • chlorthalidone (HYGROTON) 25 MG tablet Take 25 mg by mouth Daily.     • CloNIDine (CATAPRES) 0.1 MG tablet Take 1 tablet by mouth Every 12 (Twelve) Hours. 60 tablet 1   • FLUoxetine (PROzac) 10 MG capsule Take 10 mg by mouth Daily.     • gabapentin (NEURONTIN) 600 MG tablet Take 600 mg by mouth 3 (Three) Times a Day.     • HYDROcodone-acetaminophen (HYCET) 7.5-325 MG/15ML solution Take 15 mL by mouth Every 6 (Six) Hours As Needed for Moderate Pain . 118 mL 0   • metoprolol tartrate (LOPRESSOR) 25 MG tablet Take 1 tablet by mouth Every 12 (Twelve) Hours. 60 tablet 1   • montelukast (SINGULAIR) 10 MG tablet Take 10 mg by mouth every  "night.     • OXcarbazepine (TRILEPTAL) 600 MG tablet Take 600 mg by mouth 2 (Two) Times a Day.     • oxyCODONE (ROXICODONE) 10 MG tablet Take 10 mg by mouth Every 6 (Six) Hours As Needed for Severe Pain .     • pantoprazole (PROTONIX) 40 MG EC tablet Take 40 mg by mouth 2 (Two) Times a Day.     • polyethylene glycol (MIRALAX) packet Take 17 g by mouth Daily.     • SITagliptin (JANUVIA) 50 MG tablet Take 50 mg by mouth 2 (Two) Times a Day.     • sucralfate (CARAFATE) 1 g tablet Take 1 g by mouth 4 (Four) Times a Day.     • traZODone (DESYREL) 100 MG tablet Take 100 mg by mouth Every Night.       No current facility-administered medications for this visit.          Allergies  Zithromax [azithromycin]; Amoxicillin; Codeine; Penicillins; and Phenobarbital    /80   Pulse 60   Temp 98.3 °F (36.8 °C)   Resp 17   Ht 177.8 cm (70\")   Wt 93.9 kg (207 lb)   SpO2 97%   BMI 29.70 kg/m²      Physical Exam    General :  Patient is a 63 y.o. female in no acute distress.    HEENT:  Normocephalic, pupils equally round and reactive to light, extraocular motions intact.  Chest:  Clear bilaterally. Equal breath sounds. No rales or rhonchi.  Heart:   Regular rate and rhythm.  Abdomen:  Wounds look good.  Staples removed  :  Normal external genitalia.  Rectal:  Not performed.  Extremities:  No clubbing cyanosis or edema. Good femoral, popliteal, dorsalis pedis and posterior tibial pulse.  Neurologic:  Patient oriented ×3. Cranial nerves grossly intact. No sensory,cellebellar, or motor dysfunction.                ASSESSMENT  Status post repair of paraesophageal hernia        PLAN    Advance diet.  Return 1 month    Patient's Body mass index is 29.7 kg/m². BMI is above normal parameters. Recommendations include: educational material.           This document signed by Lukas Nelson MD July 9, 2019 1:56 PM   "

## 2019-07-10 ENCOUNTER — READMISSION MANAGEMENT (OUTPATIENT)
Dept: CALL CENTER | Facility: HOSPITAL | Age: 64
End: 2019-07-10

## 2019-07-10 NOTE — OUTREACH NOTE
General Surgery Week 2 Survey      Responses   Facility patient discharged from?  [facility]   Does the patient have one of the following disease processes/diagnoses(primary or secondary)?  General Surgery   Week 2 attempt successful?  No   Unsuccessful attempts  Attempt 1          May Jackson RN

## 2019-07-11 ENCOUNTER — READMISSION MANAGEMENT (OUTPATIENT)
Dept: CALL CENTER | Facility: HOSPITAL | Age: 64
End: 2019-07-11

## 2019-07-11 NOTE — OUTREACH NOTE
General Surgery Week 2 Survey      Responses   Facility patient discharged from?  [facility]   Does the patient have one of the following disease processes/diagnoses(primary or secondary)?  General Surgery   Week 2 attempt successful?  Yes   Call start time  1619   Call end time  1621   General alerts for this patient  wound care   Discharge diagnosis  Laparoscopic repair of paraesophageal hernia with bioabsorbable patch and Nissen fundoplication   Meds reviewed with patient/caregiver?  Yes   Is the patient having any side effects they believe may be caused by any medication additions or changes?  No   Does the patient have all medications related to this admission filled (includes all antibiotics, pain medications, etc.)  Yes   Is the patient taking all medications as directed (includes completed medication regime)?  Yes   Does the patient have a follow up appointment scheduled with their surgeon?  Yes   Has the patient kept scheduled appointments due by today?  N/A   Has home health visited the patient within 72 hours of discharge?  Yes   Psychosocial issues?  No   Did the patient receive a copy of their discharge instructions?  Yes   Nursing interventions  Reviewed instructions with patient   What is the patient's perception of their health status since discharge?  Improving   Nursing interventions  Nurse provided patient education   Is the patient /caregiver able to teach back basic post-op care?  Continue use of incentive spirometry at least 1 week post discharge, Lifting as instructed by MD in discharge instructions, Take showers only when approved by MD-sponge bathe until then, Drive as instructed by MD in discharge instructions, No tub bath, swimming, or hot tub until instructed by MD, Keep incision areas clean,dry and protected   Is the patient/caregiver able to teach back signs and symptoms of incisional infection?  Fever, Pus or odor from incision, Incisional warmth, Increased drainage or bleeding, Increased  redness, swelling or pain at the incisonal site   Is the patient/caregiver able to teach back steps to recovery at home?  Set small, achievable goals for return to baseline health, Rest and rebuild strength, gradually increase activity, Make a list of questions for surgeon's appointment   Is the patient/caregiver able to teach back the hierarchy of who to call/visit for symptoms/problems? PCP, Specialist, Home health nurse, Urgent Care, ED, 911  Yes   Week 2 call completed?  Yes   Revoked  No further contact(revokes)-requires comment   Graduated/Revoked comments  Patient prefers no further calls          Norris Morin RN

## 2019-08-14 ENCOUNTER — TELEPHONE (OUTPATIENT)
Dept: SURGERY | Facility: CLINIC | Age: 64
End: 2019-08-14

## 2019-08-14 NOTE — TELEPHONE ENCOUNTER
PATIENT HAD AN APPT ON 08- BUT DIDN'T SHOW UP. I CALLED TO RESCHEDULE THAT APPT BUT GOT NO ANSWER BUT LEFT A MESSAGE ON HER ANSWERING MACHINE.

## 2021-06-07 NOTE — ED NOTES
Paged Dr. Antunez per Dr. Sindy [facility]  12/16/18 [id number]     - - -

## 2021-12-13 ENCOUNTER — TRANSCRIBE ORDERS (OUTPATIENT)
Dept: ADMINISTRATIVE | Facility: HOSPITAL | Age: 66
End: 2021-12-13

## 2021-12-13 DIAGNOSIS — M51.36 DEGENERATION OF LUMBAR INTERVERTEBRAL DISC: Primary | ICD-10-CM

## 2021-12-30 ENCOUNTER — APPOINTMENT (OUTPATIENT)
Dept: MRI IMAGING | Facility: HOSPITAL | Age: 66
End: 2021-12-30

## 2022-01-14 ENCOUNTER — APPOINTMENT (OUTPATIENT)
Dept: MRI IMAGING | Facility: HOSPITAL | Age: 67
End: 2022-01-14

## 2022-01-27 ENCOUNTER — APPOINTMENT (OUTPATIENT)
Dept: MRI IMAGING | Facility: HOSPITAL | Age: 67
End: 2022-01-27

## 2025-04-29 NOTE — PROGRESS NOTES
Chief Complaint   Patient presents with   • EGD/Colonoscopy follow up       Mary Kay Goldstein is a 63 y.o. female who presents to the office today for follow up appointment for EGD/Colonoscopy follow up  .    HPI  The patient was seen for a follow up visit.  Last visit she was referred to Dr. Nelson who performed EGD/colonoscopy which revealed a large paraesophageal hernia, diverticulosis and a large protruding hemorrhoid.  Patient has also completed an esophagram and UGI also reported the large paraesophageal hernia and esophageal reflux.  The patient reports abdominal discomfort and hematochezia.          Review of Systems   Constitutional: Positive for fatigue. Negative for chills and fever.   HENT: Positive for hearing loss and trouble swallowing.    Eyes: Positive for visual disturbance.   Respiratory: Positive for choking and shortness of breath. Negative for cough and chest tightness.    Cardiovascular: Negative for chest pain.   Gastrointestinal: Positive for abdominal distention, abdominal pain, anal bleeding, diarrhea and rectal pain. Negative for blood in stool, constipation and vomiting.   Endocrine: Negative.    Genitourinary: Negative for difficulty urinating.   Musculoskeletal: Positive for back pain. Negative for neck pain.   Skin: Negative for pallor.   Allergic/Immunologic: Negative.    Neurological: Positive for dizziness and light-headedness. Negative for headaches.   Hematological: Negative.    Psychiatric/Behavioral: Negative.        ACTIVE PROBLEMS:   Specialty Problems        Gastroenterology Problems    Nausea and vomiting        Hiatal hernia              PAST MEDICAL HISTORY:  Past Medical History:   Diagnosis Date   • Arthritis    • Asthma    • Blind    • Diabetes mellitus (CMS/HCC)    • Elevated cholesterol    • GERD (gastroesophageal reflux disease)    • Hypertension    • Low back pain    • Seizures (CMS/HCC)    • Stomach ulcer        SURGICAL HISTORY:  Past Surgical History:   Procedure  Laterality Date   • APPENDECTOMY     • BREAST SURGERY      reduction   • CHOLECYSTECTOMY     • COLONOSCOPY      about 7 years ago   • COLONOSCOPY N/A 2/8/2019    Procedure: COLONOSCOPY;  Surgeon: Lukas Nelson MD;  Location:  [facility] OR;  Service: Gastroenterology   • ENDOSCOPY N/A 2/8/2019    Procedure: ESOPHAGOGASTRODUODENOSCOPY;  Surgeon: Luaks Nelson MD;  Location:  [facility] OR;  Service: Gastroenterology   • EYE SURGERY     • HYSTERECTOMY         FAMILY HISTORY:  Family History   Problem Relation Age of Onset   • Cancer Mother    • Diabetes Mother    • Hypertension Mother    • Hypertension Father    • Heart disease Father        SOCIAL HISTORY:  Social History     Tobacco Use   • Smoking status: Never Smoker   • Smokeless tobacco: Never Used   Substance Use Topics   • Alcohol use: No       CURRENT MEDICATION:    Current Outpatient Medications:   •  albuterol (PROVENTIL HFA;VENTOLIN HFA) 108 (90 BASE) MCG/ACT inhaler, Inhale 2 puffs Every 4 (Four) Hours As Needed for Wheezing or Shortness of Air., Disp: , Rfl:   •  amLODIPine (NORVASC) 5 MG tablet, Take 5 mg by mouth Daily., Disp: , Rfl:   •  cetirizine (zyrTEC) 10 MG tablet, Take 10 mg by mouth Every Night., Disp: , Rfl:   •  chlorthalidone (HYGROTON) 25 MG tablet, Take 25 mg by mouth Daily., Disp: , Rfl:   •  FLUoxetine (PROzac) 20 MG capsule, Take 20 mg by mouth Daily., Disp: , Rfl:   •  linaclotide (LINZESS) 72 MCG capsule capsule, Take 72 mcg by mouth Every Morning Before Breakfast., Disp: , Rfl:   •  montelukast (SINGULAIR) 10 MG tablet, Take 10 mg by mouth every night., Disp: , Rfl:   •  ondansetron ODT (ZOFRAN ODT) 4 MG disintegrating tablet, Take 1 tablet by mouth Every 8 (Eight) Hours As Needed for Nausea or Vomiting., Disp: 20 tablet, Rfl: 0  •  OXcarbazepine (TRILEPTAL) 600 MG tablet, Take 600 mg by mouth 2 (Two) Times a Day., Disp: , Rfl:   •  oxyCODONE (ROXICODONE) 10 MG tablet, Take 10 mg by mouth Every 6 (Six) Hours As Needed for Severe Pain .,  [Full time] : Work status: full time "Disp: , Rfl:   •  pantoprazole (PROTONIX) 40 MG EC tablet, Take 40 mg by mouth 2 (Two) Times a Day., Disp: , Rfl:   •  polyethylene glycol (MIRALAX) packet, Take 17 g by mouth Daily., Disp: , Rfl:   •  SITagliptin (JANUVIA) 50 MG tablet, Take 50 mg by mouth Daily., Disp: , Rfl:   •  sucralfate (CARAFATE) 1 g tablet, Take 1 g by mouth 4 (Four) Times a Day., Disp: , Rfl:   •  traZODone (DESYREL) 50 MG tablet, Take 50 mg by mouth Every Night., Disp: , Rfl:     ALLERGIES:  Zithromax [azithromycin]; Amoxicillin; Codeine; Penicillins; and Phenobarbital    VISIT VITALS:  /89 (BP Location: Left arm, Patient Position: Sitting, Cuff Size: Adult)   Pulse 101   Ht 177.8 cm (70\")   Wt 101 kg (223 lb)   SpO2 95%   BMI 32.00 kg/m²     Physical Exam   Constitutional: She is oriented to person, place, and time. She appears well-developed and well-nourished. No distress.   HENT:   Head: Normocephalic and atraumatic.   Right Ear: External ear normal.   Left Ear: External ear normal.   Nose: Nose normal.   Mouth/Throat: Oropharynx is clear and moist. No oropharyngeal exudate.   Eyes: Right eye exhibits no discharge. Left eye exhibits no discharge. No scleral icterus.   Bilateral blindness   Neck: Normal range of motion. Neck supple. No JVD present. No tracheal deviation present. No thyromegaly present.   Cardiovascular: Normal rate, regular rhythm, normal heart sounds and intact distal pulses. Exam reveals no gallop and no friction rub.   No murmur heard.  Pulmonary/Chest: Effort normal and breath sounds normal. No stridor. No respiratory distress. She has no wheezes. She has no rales. She exhibits no tenderness.   Abdominal: Soft. Bowel sounds are normal. She exhibits no distension and no mass. There is tenderness (epigastric ). There is no rebound and no guarding. No hernia.   Genitourinary: Rectal exam shows guaiac negative stool.   Musculoskeletal: She exhibits no edema, tenderness or deformity.   Lymphadenopathy:     " [Dull/Aching] : dull/aching She has no cervical adenopathy.   Neurological: She is alert and oriented to person, place, and time. She has normal reflexes. She displays normal reflexes. No cranial nerve deficit. She exhibits normal muscle tone. Coordination normal.   Skin: Skin is warm and dry. No rash noted. She is not diaphoretic. No erythema. No pallor.   Psychiatric: She has a normal mood and affect. Her behavior is normal. Judgment and thought content normal.       Assessment/Plan      Diagnosis Plan   1. Epigastric pain     2. Hematochezia     3. Internal hemorrhoids     4. Gastroesophageal reflux disease, esophagitis presence not specified     5. Paraesophageal hernia       The patient will be following up with Dr. Nelson as well today to discuss paraesophageal hernia repair.  She states that her medication is controlling her acid reflux.  Patient will follow up in 2 months, which will be after her hernia repair surgery.  She voiced understanding and agreement of recommendations.  Return in about 8 weeks (around 5/14/2019) for Recheck.         Patient's Body mass index is 32 kg/m². BMI is above normal parameters. Recommendations include: educational material.      JEREMY Aleman   [Intermittent] : intermittent [Leisure] : leisure [Social interactions] : social interactions [Exercising] : exercising [de-identified] : 4/29/25: Return visit for this 75 year old female presenting for spontaneous onset of left knee pain after doing Pilates x 2-3 weeks ago. States her pain started 2 weeks ago after trying a new exercise in her Pilates class. Pain is intermittent. States that it is more of a discomfort and ache rather than pain.   9/5/24  Return visit for this 74 year old female who felt a small nodule over front of her rt knee x 3 weeks ago which now is gone. No c/o pain. Walking fine. Uses a voltaren gel 2x/week when taking pilates class. [] : no [FreeTextEntry1] : right knee [de-identified] : none [de-identified] : t bank

## (undated) DEVICE — DRSNG WND BORDR/ADHS NONADHR/GZ LF 4X10IN STRL

## (undated) DEVICE — JACKSON-PRATT 100CC BULB RESERVOIR: Brand: CARDINAL HEALTH

## (undated) DEVICE — DRN PENRS SIL 1/2X12IN LXF

## (undated) DEVICE — LAB CORP FIT LEUR FEM CELLDYN 1/16IN ID FOR SAPPHIRE

## (undated) DEVICE — TRAP,MUCUS SPECIMEN,40CC: Brand: MEDLINE

## (undated) DEVICE — ENDOCUT SCISSOR TIP, DISPOSABLE: Brand: RENEW

## (undated) DEVICE — SINGLE PORT MANIFOLD: Brand: NEPTUNE 2

## (undated) DEVICE — VICRYL VLT 0 45CM ENDOLOOP: Brand: ENDOLOOP

## (undated) DEVICE — ENCORE® LATEX MICRO SIZE 7.5, STERILE LATEX POWDER-FREE SURGICAL GLOVE: Brand: ENCORE

## (undated) DEVICE — SKIN AFFIX SURG ADHESIVE 72/CS 0.55ML: Brand: MEDLINE

## (undated) DEVICE — DRAPE, LAVH, STERILE: Brand: MEDLINE

## (undated) DEVICE — HOLDER: Brand: DEROYAL

## (undated) DEVICE — POLYESTER SINGLE STITCH RELOAD: Brand: SURGIDAC

## (undated) DEVICE — 2, DISPOSABLE SUCTION/IRRIGATOR WITH DISPOSABLE TIP: Brand: STRYKEFLOW

## (undated) DEVICE — SUT MNCRYL PLS ANTIB UD 4/0 PS2 18IN

## (undated) DEVICE — DRN PENRS XRAY DECT 1/2X12IN STRL LTX

## (undated) DEVICE — THE BITE BLOCK MAXI, LATEX FREE STRAP IS USED TO PROTECT THE ENDOSCOPE INSERTION TUBE FROM BEING BITTEN BY THE PATIENT.

## (undated) DEVICE — GOWN,REINF,POLY,ECL,PP SLV,XL: Brand: MEDLINE

## (undated) DEVICE — HARMONIC ACE +7 LAPAROSCOPIC SHEARS ADVANCED HEMOSTASIS 5MM DIAMETER 36CM SHAFT LENGTH  FOR USE WITH GRAY HAND PIECE ONLY: Brand: HARMONIC ACE

## (undated) DEVICE — ENDOPATH XCEL BLUNT TIP TROCARS WITH SMOOTH SLEEVES: Brand: ENDOPATH XCEL

## (undated) DEVICE — 40595 XL TRENDELENBURG POSITIONING KIT: Brand: 40595 XL TRENDELENBURG POSITIONING KIT

## (undated) DEVICE — [HIGH FLOW INSUFFLATOR,  DO NOT USE IF PACKAGE IS DAMAGED,  KEEP DRY,  KEEP AWAY FROM SUNLIGHT,  PROTECT FROM HEAT AND RADIOACTIVE SOURCES.]: Brand: PNEUMOSURE

## (undated) DEVICE — TROCAR: Brand: KII SLEEVE

## (undated) DEVICE — ENDOPOUCH RETRIEVER SPECIMEN RETRIEVAL BAGS: Brand: ENDOPOUCH RETRIEVER

## (undated) DEVICE — SUTURING DEVICE: Brand: ENDO STITCH

## (undated) DEVICE — PK LAP GEN 70

## (undated) DEVICE — ENDOPATH XCEL BLADELESS TROCARS WITH STABILITY SLEEVES: Brand: ENDOPATH XCEL

## (undated) DEVICE — Device

## (undated) DEVICE — PENCL E/S HNDSWCH PUSHBTN HOLSTR 10FT

## (undated) DEVICE — TUBING, SUCTION, 1/4" X 20', STRAIGHT: Brand: MEDLINE INDUSTRIES, INC.

## (undated) DEVICE — ENDOPATH XCEL UNIVERSAL TROCAR STABLILITY SLEEVES: Brand: ENDOPATH XCEL

## (undated) DEVICE — SYR LUERLOK 30CC

## (undated) DEVICE — PROXIMATE RH ROTATING HEAD SKIN STAPLERS (35 WIDE) CONTAINS 35 STAINLESS STEEL STAPLES: Brand: PROXIMATE

## (undated) DEVICE — 3M™ IOBAN™ 2 ANTIMICROBIAL INCISE DRAPE 6650EZ: Brand: IOBAN™ 2

## (undated) DEVICE — LAPAROSCOPIC SCOPE WARMER: Brand: DEROYAL

## (undated) DEVICE — TROCAR: Brand: KII FIOS FIRST ENTRY

## (undated) DEVICE — DRAPE,UTILTY,TAPE,15X26, 4EA/PK: Brand: MEDLINE

## (undated) DEVICE — CONN Y IRR DISP 1P/U

## (undated) DEVICE — Device: Brand: DEFENDO AIR/WATER/SUCTION AND BIOPSY VALVE

## (undated) DEVICE — APPL CHLORAPREP W/TINT 26ML ORNG

## (undated) DEVICE — MARKR SKIN W/RULR AND LBL

## (undated) DEVICE — BNDG ADHS CURAD PLSTC SPOT 7/8IN STRL LF

## (undated) DEVICE — TP DETACH LIGHTED INNERVISION 56F

## (undated) DEVICE — ENDOGATOR AUXILIARY WATER JET CONNECTOR: Brand: ENDOGATOR

## (undated) DEVICE — ECHELON FLEX45 ENDOPATH STAPLER, ARTICULATING ENDOSCOPIC LINEAR CUTTER (NO CARTRIDGE): Brand: ECHELON ENDOPATH

## (undated) DEVICE — TROCAR: Brand: KII® SLEEVE

## (undated) DEVICE — PDS II VLT 0 107CM AG ST3: Brand: ENDOLOOP

## (undated) DEVICE — SUT VIC 0/0 UR6 27IN DYED J603H

## (undated) DEVICE — DRN WND HUBLSS FLUT FULL PERF SIL10MM

## (undated) DEVICE — ULTRACLEAN ACCESSORY ELECTRODE 6" (15.24 CM) COATED BLADE: Brand: ULTRACLEAN

## (undated) DEVICE — SUT MNCRYL 4/0 PS2 18 IN

## (undated) DEVICE — TOTAL TRAY, 16FR 10ML SIL FOLEY, URN: Brand: MEDLINE